# Patient Record
Sex: FEMALE | Race: BLACK OR AFRICAN AMERICAN | NOT HISPANIC OR LATINO | Employment: FULL TIME | URBAN - METROPOLITAN AREA
[De-identification: names, ages, dates, MRNs, and addresses within clinical notes are randomized per-mention and may not be internally consistent; named-entity substitution may affect disease eponyms.]

---

## 2018-03-12 ENCOUNTER — HOSPITAL ENCOUNTER (EMERGENCY)
Facility: OTHER | Age: 63
Discharge: HOME | End: 2018-03-12
Attending: EMERGENCY MEDICINE

## 2018-03-12 VITALS
HEIGHT: 66 IN | OXYGEN SATURATION: 96 % | DIASTOLIC BLOOD PRESSURE: 74 MMHG | BODY MASS INDEX: 41.78 KG/M2 | WEIGHT: 260 LBS | RESPIRATION RATE: 16 BRPM | TEMPERATURE: 98 F | SYSTOLIC BLOOD PRESSURE: 163 MMHG | HEART RATE: 52 BPM

## 2018-03-12 DIAGNOSIS — L02.213 ABSCESS OF CHEST WALL: Primary | ICD-10-CM

## 2018-03-12 PROCEDURE — 25000003 PHARM REV CODE 250: Performed by: PHYSICIAN ASSISTANT

## 2018-03-12 PROCEDURE — 99283 EMERGENCY DEPT VISIT LOW MDM: CPT

## 2018-03-12 RX ORDER — CEPHALEXIN 500 MG/1
500 CAPSULE ORAL 4 TIMES DAILY
Qty: 20 CAPSULE | Refills: 0 | Status: SHIPPED | OUTPATIENT
Start: 2018-03-12 | End: 2018-03-19

## 2018-03-12 RX ORDER — IBUPROFEN 800 MG/1
800 TABLET ORAL EVERY 6 HOURS PRN
Qty: 20 TABLET | Refills: 0 | Status: SHIPPED | OUTPATIENT
Start: 2018-03-12 | End: 2019-09-12 | Stop reason: CLARIF

## 2018-03-12 RX ORDER — SULFAMETHOXAZOLE AND TRIMETHOPRIM 800; 160 MG/1; MG/1
1 TABLET ORAL
COMMUNITY
End: 2019-09-12 | Stop reason: CLARIF

## 2018-03-12 RX ORDER — IBUPROFEN 400 MG/1
800 TABLET ORAL
Status: COMPLETED | OUTPATIENT
Start: 2018-03-12 | End: 2018-03-12

## 2018-03-12 RX ORDER — CEPHALEXIN 500 MG/1
500 CAPSULE ORAL
Status: COMPLETED | OUTPATIENT
Start: 2018-03-12 | End: 2018-03-12

## 2018-03-12 RX ADMIN — IBUPROFEN 800 MG: 400 TABLET, FILM COATED ORAL at 02:03

## 2018-03-12 RX ADMIN — CEPHALEXIN 500 MG: 500 CAPSULE ORAL at 02:03

## 2018-03-12 NOTE — ED PROVIDER NOTES
Encounter Date: 3/12/2018       History     Chief Complaint   Patient presents with    Abscess     reports bite to R chest wall. has been on bactrim x 1 wk with swelling slightly worsening.      Patient is a 63 y.o. female presenting to the emergency department with complaints of an abscess.  The patient reports that she initially noticed an area of redness and pain on the right side of her chest on 18.  She states that the area progressively worsened.  She reports she was seen at her primary care provider's office on 3/5/18.  She states she was told it was too hard to open, and instead given a prescription for Bactrim.  She states she's been taking this appropriately in addition to Aleve for pain and has been applying topical antibiotic ointment.  She reports that the areas continued to progressively worsened.  She states that 3 days ago it opened and started draining.  She states she is concerned because it is not improving.  She denies fever or chills, nausea or vomiting.  She denies previous episode.      The history is provided by the patient.     Review of patient's allergies indicates:  No Known Allergies  Past Medical History:   Diagnosis Date    Diverticulitis      Past Surgical History:   Procedure Laterality Date     SECTION, CLASSIC       Family History   Problem Relation Age of Onset    Breast cancer Neg Hx     Cancer Neg Hx     Ovarian cancer Neg Hx      Social History   Substance Use Topics    Smoking status: Current Every Day Smoker     Packs/day: 1.00     Types: Cigarettes, Cigars    Smokeless tobacco: Not on file    Alcohol use Yes     Review of Systems   Constitutional: Negative for activity change, appetite change, chills, fatigue and fever.   HENT: Negative for congestion, rhinorrhea and sore throat.    Eyes: Negative for photophobia and visual disturbance.   Respiratory: Negative for cough, shortness of breath and wheezing.    Cardiovascular: Negative for chest pain.    Gastrointestinal: Negative for abdominal pain, diarrhea, nausea and vomiting.   Genitourinary: Negative for dysuria, hematuria and urgency.   Musculoskeletal: Negative for back pain, myalgias and neck pain.   Skin: Negative for color change and wound.   Neurological: Negative for weakness and headaches.   Psychiatric/Behavioral: Negative for agitation and confusion.       Physical Exam     Initial Vitals [03/12/18 1240]   BP Pulse Resp Temp SpO2   (!) 159/74 62 18 98.2 °F (36.8 °C) 95 %      MAP       102.33         Physical Exam    Nursing note and vitals reviewed.  Constitutional: She appears well-developed and well-nourished. She is not diaphoretic. She is cooperative.  Non-toxic appearance. She does not have a sickly appearance. She does not appear ill. No distress.   Well appearing -American female unaccompanied in the emergency department.  She speaking clear and full sentences.  She is in no acute distress.   HENT:   Head: Normocephalic and atraumatic.   Right Ear: External ear normal.   Left Ear: External ear normal.   Nose: Nose normal.   Mouth/Throat: Oropharynx is clear and moist.   Eyes: Conjunctivae and EOM are normal.   Neck: Normal range of motion. Neck supple.   Musculoskeletal: Normal range of motion.   Neurological: She is alert and oriented to person, place, and time.   Skin: Skin is warm.        Psychiatric: She has a normal mood and affect. Her behavior is normal. Judgment and thought content normal.         ED Course   Procedures  Labs Reviewed - No data to display          Medical Decision Making:   Initial Assessment:   Urgent evaluation of a 63-year-old female presenting with complaints of an abscess ×10 days.  Patient is afebrile, nontoxic appearing, hemodynamically stable.  Physical exam reveals a 3 cm, open abscess with no overlying cellulitis.  ED Management:  Signs and symptoms are consistent with an abscess.  I do not feel that Bactrim is sufficient coverage, will add Keflex.   Patient's first dose was administered in the emergency department.  Counseled on symptomatic care and treatment, stable for discharge home. The patient was instructed to follow up with a primary care provider in 2 days or to return to the emergency department for worsening symptoms. The treatment plan was discussed with the patient who demonstrated understanding and comfort with plan. The patient's history, physical exam, and plan of care was discussed with and agreed upon with my supervising physician.    Other:   I have discussed this case with another health care provider.       <> Summary of the Discussion: Dr. Arvizu  This note was created using Dragon Medical Dictation. There may be typographical errors secondary to dictation.                       Clinical Impression:     1. Abscess of chest wall         Disposition:   Disposition: Discharged  Condition: Stable                        Kamilla Green PA-C  03/12/18 1446

## 2018-03-12 NOTE — ED NOTES
Pt presents with an abscess to the right chest wall X10 days. Pt was seen by he MD on 3/5/18 and given abx, sent home and told to do warm compresses. Pt reports that the pain and area of abscess has gotten worse since then . Pt reports on Thursday 3/8 the abscess began to open and drain.

## 2019-01-26 ENCOUNTER — HOSPITAL ENCOUNTER (EMERGENCY)
Facility: OTHER | Age: 64
Discharge: HOME | End: 2019-01-26
Attending: EMERGENCY MEDICINE

## 2019-01-26 VITALS
HEIGHT: 66 IN | WEIGHT: 268.75 LBS | OXYGEN SATURATION: 96 % | RESPIRATION RATE: 18 BRPM | TEMPERATURE: 98 F | DIASTOLIC BLOOD PRESSURE: 76 MMHG | SYSTOLIC BLOOD PRESSURE: 157 MMHG | HEART RATE: 77 BPM | BODY MASS INDEX: 43.19 KG/M2

## 2019-01-26 DIAGNOSIS — N30.90 CYSTITIS: Primary | ICD-10-CM

## 2019-01-26 LAB
BACTERIA #/AREA URNS HPF: ABNORMAL /HPF
BILIRUB UR QL STRIP: NEGATIVE
CLARITY UR: ABNORMAL
COLOR UR: YELLOW
GLUCOSE UR QL STRIP: NEGATIVE
HGB UR QL STRIP: ABNORMAL
HYALINE CASTS #/AREA URNS LPF: 0 /LPF
KETONES UR QL STRIP: NEGATIVE
LEUKOCYTE ESTERASE UR QL STRIP: ABNORMAL
MICROSCOPIC COMMENT: ABNORMAL
NITRITE UR QL STRIP: NEGATIVE
PH UR STRIP: 6 [PH] (ref 5–8)
PROT UR QL STRIP: ABNORMAL
RBC #/AREA URNS HPF: 5 /HPF (ref 0–4)
SP GR UR STRIP: 1.02 (ref 1–1.03)
URN SPEC COLLECT METH UR: ABNORMAL
UROBILINOGEN UR STRIP-ACNC: NEGATIVE EU/DL
WBC #/AREA URNS HPF: >100 /HPF (ref 0–5)

## 2019-01-26 PROCEDURE — 87186 SC STD MICRODIL/AGAR DIL: CPT

## 2019-01-26 PROCEDURE — 87086 URINE CULTURE/COLONY COUNT: CPT

## 2019-01-26 PROCEDURE — 87077 CULTURE AEROBIC IDENTIFY: CPT

## 2019-01-26 PROCEDURE — 99284 EMERGENCY DEPT VISIT MOD MDM: CPT

## 2019-01-26 PROCEDURE — 87088 URINE BACTERIA CULTURE: CPT

## 2019-01-26 PROCEDURE — 81000 URINALYSIS NONAUTO W/SCOPE: CPT

## 2019-01-26 RX ORDER — CEPHALEXIN 500 MG/1
500 CAPSULE ORAL EVERY 12 HOURS
Qty: 14 CAPSULE | Refills: 0 | Status: SHIPPED | OUTPATIENT
Start: 2019-01-26 | End: 2019-02-02

## 2019-01-26 RX ORDER — PHENAZOPYRIDINE HYDROCHLORIDE 200 MG/1
200 TABLET, FILM COATED ORAL 3 TIMES DAILY PRN
Qty: 6 TABLET | Refills: 0 | Status: SHIPPED | OUTPATIENT
Start: 2019-01-26 | End: 2019-09-12 | Stop reason: CLARIF

## 2019-01-27 NOTE — ED PROVIDER NOTES
Encounter Date: 2019       History     Chief Complaint   Patient presents with    Urinary Tract Infection     pressure after urinating w/ frequency and strong odor     Patient is a 64-year-old female with diverticulitis who presents to the ED with urinary frequency and dysuria.  Patient reports she developed urinary frequency with a strong odor 2 days ago.  She states she increase her fluid intake and no odor resolved.  She states the dysuria has become more intense over the past day and she developed suprapubic tenderness while urinating and for short amount of time after.  She denies nausea, fever, vomiting, or flank pain. Patient states she had a UTI a few years ago and was treated with Bactrim but did not like the side effects of the medication.  She is otherwise feeling well.          Review of patient's allergies indicates:  No Known Allergies  Past Medical History:   Diagnosis Date    Diverticulitis      Past Surgical History:   Procedure Laterality Date     SECTION, CLASSIC       Family History   Problem Relation Age of Onset    Breast cancer Neg Hx     Cancer Neg Hx     Ovarian cancer Neg Hx      Social History     Tobacco Use    Smoking status: Current Every Day Smoker     Packs/day: 1.00     Types: Cigarettes, Cigars    Smokeless tobacco: Never Used   Substance Use Topics    Alcohol use: Yes    Drug use: No     Review of Systems   Constitutional: Negative for chills and fever.   HENT: Negative for congestion and sore throat.    Eyes: Negative for pain.   Respiratory: Negative for shortness of breath.    Cardiovascular: Negative for chest pain.   Gastrointestinal: Negative for abdominal pain, diarrhea, nausea and vomiting.   Genitourinary: Positive for dysuria and frequency. Negative for difficulty urinating and flank pain.   Musculoskeletal: Negative for arthralgias.   Skin: Negative for rash.   Neurological: Negative for headaches.       Physical Exam     Initial Vitals [19  2022]   BP Pulse Resp Temp SpO2   (!) 157/76 77 18 97.9 °F (36.6 °C) 96 %      MAP       --         Physical Exam    Constitutional: Vital signs are normal. She is not diaphoretic. She is cooperative. No distress.   HENT:   Head: Normocephalic and atraumatic.   Eyes: EOM are normal. Pupils are equal, round, and reactive to light.   Neck: Normal range of motion. Neck supple.   Cardiovascular: Normal rate, regular rhythm and intact distal pulses.   Pulmonary/Chest: Breath sounds normal. She has no wheezes. She has no rhonchi. She has no rales.   Abdominal: Soft. Bowel sounds are normal. There is no tenderness.   No CVA tenderness bilaterally   Musculoskeletal: Normal range of motion. She exhibits no edema.   Neurological: She is alert and oriented to person, place, and time. GCS eye subscore is 4. GCS verbal subscore is 5. GCS motor subscore is 6.   Skin: Skin is warm and dry. No rash noted.         ED Course   Procedures  Labs Reviewed   URINALYSIS, REFLEX TO URINE CULTURE - Abnormal; Notable for the following components:       Result Value    Appearance, UA Hazy (*)     Protein, UA 2+ (*)     Occult Blood UA 3+ (*)     Leukocytes, UA 2+ (*)     All other components within normal limits    Narrative:     Preferred Collection Type->Urine, Clean Catch   URINALYSIS MICROSCOPIC - Abnormal; Notable for the following components:    RBC, UA 5 (*)     WBC, UA >100 (*)     Bacteria, UA Moderate (*)     All other components within normal limits    Narrative:     Preferred Collection Type->Urine, Clean Catch   CULTURE, URINE          Imaging Results    None          Medical Decision Making:   Initial Assessment:   Urgent evaluation of a 64 y.o. Female presenting to the emergency department complaining of urinary frequency and dysuria. Patient is afebrile, nontoxic appearing and hemodynamically stable.  Patient's symptoms are consistent with cystitis. UA consistent with UTI. No further intervention necessary. I do not suspect  pyelonephritis. Will treat with Pyridium and Keflex.   Patient did have 2+ protein on her urine.  She denies any swelling. She reports normal renal function.  Do not believe lab work is indicated at this time.  Patient is advised to take Motrin or Tylenol as needed for her pain.  She is given strict return precautions advised follow up with her primary care provider next week.  She has no other complaints this time is stable for discharge.  ED Management:  Nursing assessment and intervention was not needed on this patient.                      Clinical Impression:     1. Cystitis                               Ivan Deshpande PA-C  01/26/19 3173

## 2019-01-29 LAB — BACTERIA UR CULT: NORMAL

## 2019-04-25 DIAGNOSIS — M25.569 KNEE PAIN: ICD-10-CM

## 2019-04-25 DIAGNOSIS — M25.572 LEFT ANKLE PAIN: Primary | ICD-10-CM

## 2019-05-08 ENCOUNTER — CLINICAL SUPPORT (OUTPATIENT)
Dept: REHABILITATION | Facility: OTHER | Age: 64
End: 2019-05-08
Payer: MEDICAID

## 2019-05-08 DIAGNOSIS — M25.572 CHRONIC PAIN OF LEFT ANKLE: ICD-10-CM

## 2019-05-08 DIAGNOSIS — R29.898 WEAKNESS OF BOTH LOWER EXTREMITIES: ICD-10-CM

## 2019-05-08 DIAGNOSIS — G89.29 CHRONIC PAIN OF LEFT ANKLE: ICD-10-CM

## 2019-05-08 DIAGNOSIS — M25.561 CHRONIC PAIN OF RIGHT KNEE: ICD-10-CM

## 2019-05-08 DIAGNOSIS — G89.29 CHRONIC PAIN OF RIGHT KNEE: ICD-10-CM

## 2019-05-08 DIAGNOSIS — M25.672 ANKLE STIFFNESS, LEFT: ICD-10-CM

## 2019-05-08 PROCEDURE — 97162 PT EVAL MOD COMPLEX 30 MIN: CPT | Mod: PN | Performed by: PHYSICAL THERAPIST

## 2019-05-08 NOTE — PLAN OF CARE
[facility]  Physical Therapy Initial Evaluation    Name: Denisha Becker  Clinic Number: [id number]    Therapy Diagnosis:   1. Chronic pain of right knee     2. Chronic pain of left ankle     3. Weakness of both lower extremities     4. Ankle stiffness, left         Physician: Claudia Webb, NP-C    Physician Orders: PT Eval and Treat   Medical Diagnosis from Referral:   Rt knee oa and left ankle pain     Evaluation Date: 2019  Authorization Period Expiration: 2020  Plan of Care Expiration: 2019  Visit # / Visits authorized:     Time In: 2:00pm  Time Out: 2:45pm  Total Billable Time: 45 minutes    Precautions: Standard    Subjective   Date of onset:   History of current condition - Denisha reports: fracturing her ankle (lateral malleolus) in  and was working in the school system at the time. She went out on short term disability and in a boot for 6 weeks. No surgery. She started physical therapy but was unable to afford to continue. She never regained her ROM and continues to have swelling and pain with activity. Also with major complaint of R knee pain for years. She had an injection and on naproxen with no relief. The naproxen bothered her stomach and switched to tumeric supplement for the inflammation. 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 She performed at [employer] twice 3 days following the injection.      Medical History:   Past Medical History:   Diagnosis Date    Diverticulitis        Surgical History:   Denisha Becker  has a past surgical history that includes  section, classic.    Medications:   Denisha has a current medication list which includes the following prescription(s): desonide, hydroxyzine hcl, ibuprofen, phenazopyridine, and sulfamethoxazole-trimethoprim 800-160mg.    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  Prior Level of Function: independent, dancing and performing without limitations  Current Level of Function: increased pain with squatting, descending stairs (goes 1 leg at a time), twisting, and dancing    Pain:  Current 5/10, worst 5/10, best 1/10   Location: R anterior-medial knee, L lateral ankle  Description: Aching and Tight  Aggravating Factors: Standing, Walking and twisting  Easing Factors: ice and elevation    Pts goals: To be able to continue dancing and regain the motion in her L ankle    Objective     Observation: calm and pleasant mood    Posture: increased pelvic tilt and lumbar lordosis     Range of Motion:   Knee Left active Left Passive Right Active R passive   Flexion 110 115 105 108   Extension 0 0 0 0     Ankle AROM/PROM  Left  Right    Dorsiflexion:   5/8  10/15  Plantarflexion:   30/30  45/50  Inversion:   15/20  30/40  Eversion:   8/12  15/20    Measure in degrees, *indicates pain with movement      MMT    Left  Right    Hip:  Flexion    4+/5   4/5  Extension   4/5   4-/5  Abduction   4/5   4/5  Adduction   5/5   4+/5  External Rotation  4/5   5/5  Internal rotation  5/5   5/5    Knee:  Flexion    4+/5   5/5  Extension   4-/5   5/5    Ankle:  Dorsiflexion   5/5   5/5  Plantar flexion   3+/5   4/5  Inversion   4+/5   5/5  Eversion   4/5   5/5      Special Tests:   Right   Valgus Stress Test positive   Varus Stress test negative       Function:    - SLS R: 3 sec  - SLS L: unable   - Squat: dysfunctional painful       Joint Mobility: 2/6 AP talocrual joint    Palpation: TTP medial joint line L knee    Sensation: BLE light touch sensation grossly intact    Flexibility:    Ely's test: R = 100 degrees ; L = 100 degrees   Popliteal Angle: R = 0 degrees ; L = 0 degrees   Christopher's test: R = + ; L = +      CMS Impairment/Limitation/Restriction for FOTO ankle Survey    Therapist reviewed  "FOTO scores for Denisha Becker on 5/8/2019.   FOTO documents entered into [employer] - see Media section.    Limitation Score: 52%  Goal:44%  Discharge: NA         TREATMENT   Treatment Time In: 2:30pm  Treatment Time Out: 2:45pm  Total Treatment time separate from Evaluation: 15 minutes    Densiha received therapeutic exercises to develop strength, endurance, ROM and flexibility for 10 minutes including:  Seated gastroc stretch with towel 30" x 3  Seated soleus stretch with Towel 30" x 3  Prone knee flexion stretch 30" x 3  Bridging x 10  S/L Clams OTB x 10 ea    Denisha received the following manual therapy techniques: Joint mobilizations and Manual traction were applied to the: L ankle for 5 minutes, including:  talocrual AP glides and distraction    Home Exercises and Patient Education Provided    Education provided:   - HEP    Written Home Exercises Provided: yes.  Exercises were reviewed and Denisha was able to demonstrate them prior to the end of the session.  Denisha demonstrated good  understanding of the education provided.     See EMR under Patient Instructions for exercises provided 5/8/2019.    Assessment   Denisha is a 64 y.o. female referred to outpatient Physical Therapy with a medical diagnosis of R knee and L ankle pain with hx of OA and lateral malleolus fracture. Pt presents with decreased L ankle and B knee ROM, decreased joint mobility L ankle, decreased BLE flexibility, BLE weakness, and balance impairment.     Pt prognosis is Good.   Pt will benefit from skilled outpatient Physical Therapy to address the deficits stated above and in the chart below, provide pt/family education, and to maximize pt's level of independence.     Plan of care discussed with patient: Yes  Pt's spiritual, cultural and educational needs considered and patient is agreeable to the plan of care and goals as stated below:     Anticipated Barriers for therapy: none    Medical Necessity is demonstrated by the " following  History  Co-morbidities and personal factors that may impact the plan of care Co-morbidities:   Ankle fx    Personal Factors:   no deficits     moderate   Examination  Body Structures and Functions, activity limitations and participation restrictions that may impact the plan of care Body Regions:   lower extremities    Body Systems:    ROM  strength  balance  gait    Participation Restrictions:   dancing    Activity limitations:   Learning and applying knowledge  no deficits    General Tasks and Commands  no deficits    Communication  no deficits    Mobility  walking    Self care  no deficits    Domestic Life  no deficits    Interactions/Relationships  no deficits    Life Areas  employment    Community and Social Life  no deficits         high   Clinical Presentation evolving clinical presentation with changing clinical characteristics moderate   Decision Making/ Complexity Score: moderate     Goals:  Short Term Goals (4 Weeks):   1. Patient's B knee flexion to be 120 degrees for improved functional ability to squat  2. Patient to increase L ankle dorsiflexion AROM to 15 degrees or greater for improved stair descent  3. Patient to report improved pain in L ankle and R knee by 30% or greater with ADL's  Long Term Goals (8 Weeks):   1. Patient to have decreased subjective report of disability as noted by a score of 44% or less on the FOTO knee questionnaire   2. Patient to be independent with home exercise program for improved self management of condition  3. Patient's B lower extremity strength to be 5/5 for improved functional ability to perform house hold chores  4. Patient to be able to dance with minimal to no difficulty     Plan   Plan of care Certification: 5/8/2019 to 8/2/2019.    Outpatient Physical Therapy 2 times weekly for 10 weeks to include the following interventions: Gait Training, Manual Therapy, Moist Heat/ Ice, Neuromuscular Re-ed, Patient Education, Therapeutic Activites, Therapeutic  Exercise and dry needling.     Kiana Guy, PT

## 2019-05-15 ENCOUNTER — CLINICAL SUPPORT (OUTPATIENT)
Dept: REHABILITATION | Facility: OTHER | Age: 64
End: 2019-05-15
Payer: MEDICAID

## 2019-05-15 DIAGNOSIS — G89.29 CHRONIC PAIN OF RIGHT KNEE: ICD-10-CM

## 2019-05-15 DIAGNOSIS — M25.572 CHRONIC PAIN OF LEFT ANKLE: ICD-10-CM

## 2019-05-15 DIAGNOSIS — R29.898 WEAKNESS OF BOTH LOWER EXTREMITIES: ICD-10-CM

## 2019-05-15 DIAGNOSIS — G89.29 CHRONIC PAIN OF LEFT ANKLE: ICD-10-CM

## 2019-05-15 DIAGNOSIS — M25.561 CHRONIC PAIN OF RIGHT KNEE: ICD-10-CM

## 2019-05-15 DIAGNOSIS — M25.672 ANKLE STIFFNESS, LEFT: ICD-10-CM

## 2019-05-15 PROCEDURE — 97110 THERAPEUTIC EXERCISES: CPT | Mod: PN | Performed by: PHYSICAL THERAPIST

## 2019-05-15 NOTE — PROGRESS NOTES
"  Physical Therapy Daily Treatment Note     Name: Denisha Becker  Clinic Number: [id number]    Therapy Diagnosis:   Encounter Diagnoses   Name Primary?    Chronic pain of right knee     Chronic pain of left ankle     Weakness of both lower extremities     Ankle stiffness, left      Physician: Claudia Webb NP-C    Visit Date: 5/15/2019    Physician Orders: PT Eval and Treat   Medical Diagnosis from Referral:   Rt knee oa and left ankle pain      Evaluation Date: 5/8/2019  Authorization Period Expiration: 4/24/2020  Plan of Care Expiration: 8/2/2019  Visit # / Visits authorized: 1/ 12    Time In: 510pm  Time Out: 605pm  Total Billable Time: 55 minutes    Precautions: Standard    Subjective     Pt reports: feeling a little better with the exercises. The tumeric and cutting back on meat has also been helping with the inflammation in her body. Her knee is more painful than her ankle today  She was compliant with home exercise program.  Response to previous treatment: decreased stifffness  Functional change: none    Pain: 5/10  Location: right knee      Objective     Denisha received therapeutic exercises to develop strength, endurance, ROM, flexibility, posture and core stabilization for 50 minutes including:    Upright bike x 6 min    Seated gastroc stretch with strap30" x 3  Seated soleus stretch with strap 30" x 3 (with PT overpressure)  Prone knee flexion stretch 30" x 3  Bridging x 10  S/L Clams OTB x 10 ea  QS 10" x 10  SLR x 10  SL hip abduction x 10    Denisha received the following manual therapy techniques: Joint mobilizations, Manual traction and Soft tissue Mobilization were applied to the: L ankle and R knee for 5 minutes, including:  talocrual AP glides and distraction (nt)  Rolling stick to R quads, IT band, gluteals    Denisha received cold pack for 0 minutes to R knee, L ankle.      Home Exercises Provided and Patient Education Provided     Education provided:   - HEP    Written Home Exercises " Provided: Patient instructed to cont prior HEP.  Exercises were reviewed and Denisha was able to demonstrate them prior to the end of the session.  Denisha demonstrated good  understanding of the education provided.     See EMR under Patient Instructions for exercises provided prior visit.    Assessment     Good tolerance to initiation of therapeutic exercise without exacerbation of knee pain. Pt with anterior pelvic tilt and excessive lumbar lordosis. Frequent verbal cues for engaging abdominals and neutral pelvis.   Denisha is progressing well towards her goals.   Pt prognosis is Good.     Pt will continue to benefit from skilled outpatient physical therapy to address the deficits listed in the problem list box on initial evaluation, provide pt/family education and to maximize pt's level of independence in the home and community environment.     Pt's spiritual, cultural and educational needs considered and pt agreeable to plan of care and goals.     Anticipated barriers to physical therapy: transportation    Goals:   Short Term Goals (4 Weeks):   1. Patient's B knee flexion to be 120 degrees for improved functional ability to squat (progressing, not met)  2. Patient to increase L ankle dorsiflexion AROM to 15 degrees or greater for improved stair descent (progressing, not met)  3. Patient to report improved pain in L ankle and R knee by 30% or greater with ADL's (progressing, not met)  Long Term Goals (8 Weeks):   1. Patient to have decreased subjective report of disability as noted by a score of 44% or less on the FOTO knee questionnaire  (progressing, not met)  2. Patient to be independent with home exercise program for improved self management of condition (progressing, not met)  3. Patient's B lower extremity strength to be 5/5 for improved functional ability to perform house hold chores (progressing, not met)  4. Patient to be able to dance with minimal to no difficulty  (progressing, not met)      Plan      Continue per POC with emphasis on ROM and core strength    Kiana Guy, PT

## 2019-05-21 ENCOUNTER — CLINICAL SUPPORT (OUTPATIENT)
Dept: REHABILITATION | Facility: OTHER | Age: 64
End: 2019-05-21
Payer: MEDICAID

## 2019-05-21 DIAGNOSIS — M25.672 ANKLE STIFFNESS, LEFT: ICD-10-CM

## 2019-05-21 DIAGNOSIS — G89.29 CHRONIC PAIN OF LEFT ANKLE: ICD-10-CM

## 2019-05-21 DIAGNOSIS — M25.561 CHRONIC PAIN OF RIGHT KNEE: ICD-10-CM

## 2019-05-21 DIAGNOSIS — G89.29 CHRONIC PAIN OF RIGHT KNEE: ICD-10-CM

## 2019-05-21 DIAGNOSIS — R29.898 WEAKNESS OF BOTH LOWER EXTREMITIES: ICD-10-CM

## 2019-05-21 DIAGNOSIS — M25.572 CHRONIC PAIN OF LEFT ANKLE: ICD-10-CM

## 2019-05-21 PROCEDURE — 97140 MANUAL THERAPY 1/> REGIONS: CPT | Mod: PN

## 2019-05-21 PROCEDURE — 97110 THERAPEUTIC EXERCISES: CPT | Mod: PN

## 2019-05-21 NOTE — PROGRESS NOTES
"  Physical Therapy Daily Treatment Note     Name: Denisha Becker  Clinic Number: [id number]    Therapy Diagnosis:   Encounter Diagnoses   Name Primary?    Chronic pain of right knee     Chronic pain of left ankle     Weakness of both lower extremities     Ankle stiffness, left      Physician: Claudia Webb NP-C    Visit Date: 5/21/2019    Physician Orders: PT Eval and Treat   Medical Diagnosis from Referral:   Rt knee oa and left ankle pain      Evaluation Date: 5/8/2019  Authorization Period Expiration: 4/24/2020  Plan of Care Expiration: 8/2/2019  Visit # / Visits authorized: 2/ 12    Time In: 4:00 pm  Time Out: 5:10 pm  Total Billable Time: 60 minutes    Precautions: Standard    Subjective     Pt reports: stiffness today in the knees > ankles but is overall feeling good. "The exercise helps lubricates my joints."  She was compliant with home exercise program.  Response to previous treatment: decreased stifffness  Functional change: none    Pain: 5/10  Location: right knee      Objective     Denisha received therapeutic exercises to develop strength, endurance, ROM, flexibility, posture and core stabilization for 50 minutes including:    Upright bike x 7 min    Seated gastroc stretch with strap30" x 3  Seated soleus stretch with strap 30" x 3 (with PT overpressure)  Prone knee flexion stretch 30" x 3  Bridging 2 x 10 x 3" hold  S/L Clams OTB x 10 ea  QS 10" x 10  SLR 2 x 10  SL hip abduction x 10    Denisha received the following manual therapy techniques: Joint mobilizations, Manual traction and Soft tissue Mobilization were applied to the: L ankle and R knee for 10 minutes, including:  talocrual AP glides and distraction (nt)  Rolling stick to R quads, IT band, gluteals    Denisha received cold pack for 10 minutes to R knee, L ankle.      Home Exercises Provided and Patient Education Provided     Education provided:   - HEP    Written Home Exercises Provided: Patient instructed to cont prior HEP.  Exercises " were reviewed and Denisha was able to demonstrate them prior to the end of the session.  Denisha demonstrated good  understanding of the education provided.     See EMR under Patient Instructions for exercises provided prior visit.    Assessment     Able to perform increased repetitions with several exercises with good training effect, indicating improving muscular endurance.  Denisha is progressing well towards her goals.   Pt prognosis is Good.     Pt will continue to benefit from skilled outpatient physical therapy to address the deficits listed in the problem list box on initial evaluation, provide pt/family education and to maximize pt's level of independence in the home and community environment.     Pt's spiritual, cultural and educational needs considered and pt agreeable to plan of care and goals.     Anticipated barriers to physical therapy: transportation    Goals:   Short Term Goals (4 Weeks):   1. Patient's B knee flexion to be 120 degrees for improved functional ability to squat (progressing, not met)  2. Patient to increase L ankle dorsiflexion AROM to 15 degrees or greater for improved stair descent (progressing, not met)  3. Patient to report improved pain in L ankle and R knee by 30% or greater with ADL's (progressing, not met)  Long Term Goals (8 Weeks):   1. Patient to have decreased subjective report of disability as noted by a score of 44% or less on the FOTO knee questionnaire  (progressing, not met)  2. Patient to be independent with home exercise program for improved self management of condition (progressing, not met)  3. Patient's B lower extremity strength to be 5/5 for improved functional ability to perform house hold chores (progressing, not met)  4. Patient to be able to dance with minimal to no difficulty  (progressing, not met)      Plan     Continue per POC with emphasis on ROM and core strength    Rosalie Adair, PT

## 2019-05-27 ENCOUNTER — CLINICAL SUPPORT (OUTPATIENT)
Dept: REHABILITATION | Facility: OTHER | Age: 64
End: 2019-05-27
Payer: MEDICAID

## 2019-05-27 DIAGNOSIS — M25.561 CHRONIC PAIN OF RIGHT KNEE: ICD-10-CM

## 2019-05-27 DIAGNOSIS — M25.672 ANKLE STIFFNESS, LEFT: ICD-10-CM

## 2019-05-27 DIAGNOSIS — R29.898 WEAKNESS OF BOTH LOWER EXTREMITIES: ICD-10-CM

## 2019-05-27 DIAGNOSIS — G89.29 CHRONIC PAIN OF LEFT ANKLE: ICD-10-CM

## 2019-05-27 DIAGNOSIS — G89.29 CHRONIC PAIN OF RIGHT KNEE: ICD-10-CM

## 2019-05-27 DIAGNOSIS — M25.572 CHRONIC PAIN OF LEFT ANKLE: ICD-10-CM

## 2019-05-27 PROCEDURE — 97110 THERAPEUTIC EXERCISES: CPT | Mod: PN

## 2019-05-27 NOTE — PROGRESS NOTES
"  Physical Therapy Daily Treatment Note     Name: Denisha Becker  Clinic Number: [id number]    Therapy Diagnosis:   Encounter Diagnoses   Name Primary?    Chronic pain of right knee     Chronic pain of left ankle     Weakness of both lower extremities     Ankle stiffness, left      Physician: Claudia Webb NP-C    Visit Date: 5/27/2019    Physician Orders: PT Eval and Treat   Medical Diagnosis from Referral:   Rt knee oa and left ankle pain      Evaluation Date: 5/8/2019  Authorization Period Expiration: 4/24/2020  Plan of Care Expiration: 8/2/2019  Visit # / Visits authorized: 3/12    Time In: 5:00 PM  Time Out: 6:00 PM  Total Billable Time: 30 minutes    Precautions: Standard    Subjective     Pt reports: denies pain today. Pt reports of only stiffness, mainly when first getting up in the morning or from sitting for long periods of time.   She was compliant with home exercise program.  Response to previous treatment: decreased stifffness  Functional change: none    Pain: 0/10  Location: right knee      Objective     Denisha received therapeutic exercises to develop strength, endurance, ROM, flexibility, posture and core stabilization for 40 minutes including:    Upright bike x 7 min (nt)  Stationary bike x 7 mins    Seated gastroc stretch with strap30" x 3 (on SB today)  Seated soleus stretch with strap 30" x 3 (on SB today)  Prone knee flexion stretch 30" x 3  Bridging 2 x 10 x 3" hold  S/L Clams OTB x 10 ea  QS 10" x 10  SLR 2 x 10  SL hip abduction x 20    Denisha received the following manual therapy techniques: Joint mobilizations, Manual traction and Soft tissue Mobilization were applied to the: L ankle and R knee for 0 minutes, including:  talocrual AP glides and distraction (nt)  Rolling stick to R quads, IT band, gluteals    Denisha received cold pack for 10 minutes to R knee, L ankle.      Home Exercises Provided and Patient Education Provided     Education provided:   - HEP    Written Home Exercises " Provided: Patient instructed to cont prior HEP.  Exercises were reviewed and Denisha was able to demonstrate them prior to the end of the session.  Denisha demonstrated good  understanding of the education provided.     See EMR under Patient Instructions for exercises provided prior visit.    Assessment     Pt tolerated therex today without exacerbation of pain. Increased repetitions today without reports of fatigue or difficulty. Pt required TC to keep pelvis in proper position during SL hip abd.   Denisha is progressing well towards her goals.   Pt prognosis is Good.     Pt will continue to benefit from skilled outpatient physical therapy to address the deficits listed in the problem list box on initial evaluation, provide pt/family education and to maximize pt's level of independence in the home and community environment.     Pt's spiritual, cultural and educational needs considered and pt agreeable to plan of care and goals.     Anticipated barriers to physical therapy: transportation    Goals:   Short Term Goals (4 Weeks):   1. Patient's B knee flexion to be 120 degrees for improved functional ability to squat (progressing, not met)  2. Patient to increase L ankle dorsiflexion AROM to 15 degrees or greater for improved stair descent (progressing, not met)  3. Patient to report improved pain in L ankle and R knee by 30% or greater with ADL's (progressing, not met)  Long Term Goals (8 Weeks):   1. Patient to have decreased subjective report of disability as noted by a score of 44% or less on the FOTO knee questionnaire  (progressing, not met)  2. Patient to be independent with home exercise program for improved self management of condition (progressing, not met)  3. Patient's B lower extremity strength to be 5/5 for improved functional ability to perform house hold chores (progressing, not met)  4. Patient to be able to dance with minimal to no difficulty  (progressing, not met)      Plan     Continue per POC with  emphasis on ROM and core strength    Ethan Rea, PTA

## 2019-06-06 ENCOUNTER — DOCUMENTATION ONLY (OUTPATIENT)
Dept: REHABILITATION | Facility: OTHER | Age: 64
End: 2019-06-06

## 2019-07-31 ENCOUNTER — OFFICE VISIT (OUTPATIENT)
Dept: OBSTETRICS AND GYNECOLOGY | Facility: CLINIC | Age: 64
End: 2019-07-31
Payer: MEDICAID

## 2019-07-31 VITALS
SYSTOLIC BLOOD PRESSURE: 120 MMHG | WEIGHT: 277.75 LBS | HEIGHT: 66 IN | BODY MASS INDEX: 44.64 KG/M2 | DIASTOLIC BLOOD PRESSURE: 80 MMHG

## 2019-07-31 DIAGNOSIS — Z11.3 SCREEN FOR STD (SEXUALLY TRANSMITTED DISEASE): ICD-10-CM

## 2019-07-31 DIAGNOSIS — Z12.31 VISIT FOR SCREENING MAMMOGRAM: Primary | ICD-10-CM

## 2019-07-31 PROCEDURE — 87481 CANDIDA DNA AMP PROBE: CPT | Mod: 59

## 2019-07-31 PROCEDURE — 99999 PR PBB SHADOW E&M-EST. PATIENT-LVL III: ICD-10-PCS | Mod: PBBFAC,,, | Performed by: OBSTETRICS & GYNECOLOGY

## 2019-07-31 PROCEDURE — 99213 OFFICE O/P EST LOW 20 MIN: CPT | Mod: PBBFAC | Performed by: OBSTETRICS & GYNECOLOGY

## 2019-07-31 PROCEDURE — 87491 CHLMYD TRACH DNA AMP PROBE: CPT

## 2019-07-31 PROCEDURE — 99386 PR PREVENTIVE VISIT,NEW,40-64: ICD-10-PCS | Mod: S$PBB,,, | Performed by: OBSTETRICS & GYNECOLOGY

## 2019-07-31 PROCEDURE — 99386 PREV VISIT NEW AGE 40-64: CPT | Mod: S$PBB,,, | Performed by: OBSTETRICS & GYNECOLOGY

## 2019-07-31 PROCEDURE — 99999 PR PBB SHADOW E&M-EST. PATIENT-LVL III: CPT | Mod: PBBFAC,,, | Performed by: OBSTETRICS & GYNECOLOGY

## 2019-07-31 PROCEDURE — 87801 DETECT AGNT MULT DNA AMPLI: CPT

## 2019-07-31 RX ORDER — CETIRIZINE HYDROCHLORIDE 10 MG/1
TABLET ORAL DAILY PRN
Refills: 1 | COMMUNITY
Start: 2019-07-25

## 2019-07-31 RX ORDER — KETOTIFEN FUMARATE 0.35 MG/ML
SOLUTION/ DROPS OPHTHALMIC
Refills: 2 | COMMUNITY
Start: 2019-07-25 | End: 2019-09-12 | Stop reason: CLARIF

## 2019-08-01 ENCOUNTER — PROCEDURE VISIT (OUTPATIENT)
Dept: OBSTETRICS AND GYNECOLOGY | Facility: CLINIC | Age: 64
End: 2019-08-01
Payer: MEDICAID

## 2019-08-01 VITALS
DIASTOLIC BLOOD PRESSURE: 80 MMHG | WEIGHT: 279 LBS | SYSTOLIC BLOOD PRESSURE: 120 MMHG | BODY MASS INDEX: 44.84 KG/M2 | HEIGHT: 66 IN

## 2019-08-01 DIAGNOSIS — N89.8 VAGINAL DISCHARGE: Primary | ICD-10-CM

## 2019-08-01 LAB
BACTERIAL VAGINOSIS DNA: POSITIVE
C TRACH DNA SPEC QL NAA+PROBE: NOT DETECTED
CANDIDA GLABRATA DNA: NEGATIVE
CANDIDA KRUSEI DNA: NEGATIVE
CANDIDA RRNA VAG QL PROBE: NEGATIVE
N GONORRHOEA DNA SPEC QL NAA+PROBE: NOT DETECTED
T VAGINALIS RRNA GENITAL QL PROBE: NEGATIVE

## 2019-08-01 RX ORDER — METRONIDAZOLE 7.5 MG/G
1 GEL VAGINAL DAILY
Qty: 70 G | Refills: 0 | Status: SHIPPED | OUTPATIENT
Start: 2019-08-01 | End: 2019-08-08

## 2019-08-01 NOTE — PATIENT INSTRUCTIONS
[id number]  Expires: 9/15/2019  9:37 AM  Thank you for enrolling in [facility]. Please follow the instructions below to securely access your online medical record. My allows you to send messages to your doctor, view your test results, renew your prescriptions, schedule appointments, and more.        .[email or web address]       How Do I Sign Up?  1. In your Internet browser, go to [email or web address].  2. In the lower right of the page, click the Activate Now link located under the Have Access Code? Title.  3. Enter your [facility] Access Code exactly as it appears below. You will not need to use this code after youve completed the sign-up process. If you do not sign up before the expiration date, you must request a new code.  [facility] Access Code: [email or web address]  Expires: 9/15/2019  9:37 AM    4. Enter Date of Birth (mm/dd/yyyy) as indicated and click the Next button. You will be taken to the next sign-up page.  5. Create a [facility] ID. This will be your new [facility] login ID and cannot be changed, so think of one that is secure and easy to remember.  6. Create a [facility] password.  Your password must be at least 8 characters long and contain at least 1 letter and 1 number.  You can change your password at any time.  7. Enter your Password Reset Question and Answer, then click the Next button.   8. Enter your e-mail address. You will receive e-mail notification when new information is available in [facility].  9. Click Sign Up. You can now view your medical record.     Additional Information  If you have questions, you can email [email or web address] or call [phone]  to talk to our [facility] staff. Remember, [facility] is NOT to be used for urgent needs. For medical emergencies, dial 911.

## 2019-08-05 NOTE — PROGRESS NOTES
HISTORY OF PRESENT ILLNESS:    Denisha Becker is a 64 y.o. female, , No LMP recorded. Patient is postmenopausal.,  presents for a routine exam and has no complaints.    Past Medical History:   Diagnosis Date    Diverticulitis        Past Surgical History:   Procedure Laterality Date     SECTION, CLASSIC         MEDICATIONS AND ALLERGIES:      Current Outpatient Medications:     cetirizine (ZYRTEC) 10 MG tablet, TK 1 T PO QD FOR ALLERGIES, Disp: , Rfl: 1    ketotifen (ZADITOR) 0.025 % (0.035 %) ophthalmic solution, , Disp: , Rfl: 2    desonide (DESOWEN) 0.05 % lotion, Apply topically 2 (two) times daily., Disp: 59 mL, Rfl: 0    hydrOXYzine HCl (ATARAX) 25 MG tablet, Take 1 tablet (25 mg total) by mouth every 4 (four) hours as needed for Itching., Disp: 25 tablet, Rfl: 0    ibuprofen (ADVIL,MOTRIN) 800 MG tablet, Take 1 tablet (800 mg total) by mouth every 6 (six) hours as needed for Pain., Disp: 20 tablet, Rfl: 0    metroNIDAZOLE (METROGEL VAGINAL) 0.75 % vaginal gel, Place 1 applicator vaginally once daily. Use at bedtime for 7 days, Disp: 70 g, Rfl: 0    phenazopyridine (PYRIDIUM) 200 MG tablet, Take 1 tablet (200 mg total) by mouth 3 (three) times daily as needed for Pain (bladder spasms)., Disp: 6 tablet, Rfl: 0    sulfamethoxazole-trimethoprim 800-160mg (BACTRIM DS) 800-160 mg Tab, Take 1 tablet by mouth every 12 (twelve) hours., Disp: , Rfl:     Review of patient's allergies indicates:  No Known Allergies    Family History   Problem Relation Age of Onset    Breast cancer Neg Hx     Cancer Neg Hx     Ovarian cancer Neg Hx        Social History     Socioeconomic History    Marital status: Single     Spouse name: Not on file    Number of children: Not on file    Years of education: Not on file    Highest education level: Not on file   Occupational History    Not on file   Social Needs    Financial resource strain: Not on file    Food insecurity:     Worry: Not on file      Inability: Not on file    Transportation needs:     Medical: Not on file     Non-medical: Not on file   Tobacco Use    Smoking status: Current Every Day Smoker     Packs/day: 1.00     Types: Cigarettes, Cigars    Smokeless tobacco: Never Used   Substance and Sexual Activity    Alcohol use: Yes    Drug use: No    Sexual activity: Yes     Partners: Male     Birth control/protection: None, Post-menopausal   Lifestyle    Physical activity:     Days per week: Not on file     Minutes per session: Not on file    Stress: Not on file   Relationships    Social connections:     Talks on phone: Not on file     Gets together: Not on file     Attends [employer] service: Not on file     Active member of club or organization: Not on file     Attends meetings of clubs or organizations: Not on file     Relationship status: Not on file   Other Topics Concern    Not on file   Social History Narrative    Not on file       COMPREHENSIVE GYN HISTORY:  PAP History: Denies abnormal Paps.  Infection History: Denies STDs. Denies PID.  Benign History: Denies uterine fibroids. Denies ovarian cysts. Denies endometriosis. Denies other conditions.  Cancer History: Denies cervical cancer. Denies uterine cancer or hyperplasia. Denies ovarian cancer. Denies vulvar cancer or pre-cancer. Denies vaginal cancer or pre-cancer. Denies breast cancer. Denies colon cancer.  Sexual Activity History: Reports currently being sexually active  Menstrual History: Monthly. Mod then light flow.   Dysmenorrhea History: Reports mild dysmenorrhea.       ROS:  GENERAL: No weight changes. No swelling. No fatigue. No fever.  CARDIOVASCULAR: No chest pain. No shortness of breath. No leg cramps.   NEUROLOGICAL: No headaches. No vision changes.  BREASTS: No pain. No lumps. No discharge.  ABDOMEN: No pain. No nausea. No vomiting. No diarrhea. No constipation.  REPRODUCTIVE: No abnormal bleeding.   VULVA: No pain. No lesions. No itching.  VAGINA: No relaxation. No  "itching. No odor. No discharge. No lesions.  URINARY: No incontinence. No nocturia. No frequency. No dysuria.    /80   Ht 5' 6" (1.676 m)   Wt 126 kg (277 lb 12.5 oz)   BMI 44.83 kg/m²     PE:  APPEARANCE: Well nourished, well developed, in no acute distress.  AFFECT: WNL, alert and oriented x 3.  SKIN: No acne or hirsutism.  NECK: Neck symmetric, without masses or thyromegaly.  NODES: No inguinal, cervical, axillary or femoral lymph node enlargement.  CHEST: Good respiratory effort.   ABDOMEN: Soft. No tenderness or masses. No hepatosplenomegaly. No hernias.  BREASTS: Symmetrical, no skin changes, visible lesions, palpable masses or nipple discharge bilaterally.  PELVIC: External female genitalia without lesions.  Female hair distribution. Adequate perineal body, Normal urethral meatus. Vagina moist and well rugated without lesions or discharge.  No significant cystocele or rectocele present. Cervix pink without lesions, discharge or tenderness. Uterus is 4-6 week size, regular, mobile and nontender. Adnexa without masses or tenderness.  EXTREMITIES: No edema    DIAGNOSIS:  1. Visit for screening mammogram    2. Screen for STD (sexually transmitted disease)        PLAN:    Orders Placed This Encounter    C. trachomatis/N. gonorrhoeae by AMP DNA    Vaginosis Screen by DNA Probe    Mammo Digital Screening Bilat       COUNSELING:  The patient was counseled today on:  -A.C.S. Pap and pelvic exam guidelines (pap every 3 years), recomendations for yearly mammogram;  -to follow up with her PCP for other health maintenance.    FOLLOW-UP with me for Ambien for pmb  "

## 2019-08-15 ENCOUNTER — TELEPHONE (OUTPATIENT)
Dept: OBSTETRICS AND GYNECOLOGY | Facility: CLINIC | Age: 64
End: 2019-08-15

## 2019-08-15 DIAGNOSIS — N95.0 PMB (POSTMENOPAUSAL BLEEDING): Primary | ICD-10-CM

## 2019-08-15 NOTE — TELEPHONE ENCOUNTER
[place] [facility] [place]     [place] with intermittent PMB since May. Today reports sharp abdominal pain 8/10, no radiation, pelvic & lower back pain, no N&V, no pain.   Patient described as menstrual like cramping and did begin with onset on vaginal spotting. She has taken no meds, we discussed NSAIDS for pain.  Patient given strict pain precautions given.     Fibroid uterus. No visible endometrial abnormality. Left ovary within normal limits, but right ovary not visualized.    Preliminary Report Dictated By: Bhupendra Dumont MD    Electronically Signed By: Jones Powers MD 5/2/2019 10:34 AM CDT   Result Narrative   [facility] [place]  EXAM END TIME: 5/1/2019 04:53 PM  CLINICAL HISTORY: N95.0   Postmenopausal bleeding       Last menstrual period: 2006    TECHNIQUE: Pelvic ultrasound. Images were acquired transabdominally, obtained in grayscale and color Doppler. Examination limited secondary to patient habitus.  COMPARISON: None.       FINDINGS:    Uterus:     Dimensions: 10.5 x 5.8 x 8.6 cm     Parenchyma:  Somewhat large. Multiple hypoechoic masses are noted consistent with fibroids.     Endometrium:        Thickness: 5 mm. The study does not suggest any concerning endometrial finding, although the endometrium is only moderately seen secondary to the fibroid character.        Parenchyma: Normal in thickness and appearance for postmenopausal status.    Cervix: Not well seen.    RIGHT ovary: Not visualized.    LEFT ovary:     Dimensions: 4.7 x 1.7 x 2.2 cm     Parenchyma: Normal in shape, size, and appearance for age and menstrual status. No masses are noted.       Flow: Within normal limits.    Peritoneum/Retroperitoneum: No significant fluid is seen in the CUL-DE-SAC.

## 2019-08-15 NOTE — TELEPHONE ENCOUNTER
----- Message from Montserrat Quezada sent at 8/15/2019 10:47 AM CDT -----  Contact: pt  Name of Who is Calling: BLANE VALDIVIA [[id number]]      What is the request in detail: pt state that she is having cramping in her back and abdominal area and would like to speak with staff. Please contact to further discuss and advise.      Can the clinic reply by [facility]: n       What [facility]: [phone]

## 2019-08-15 NOTE — TELEPHONE ENCOUNTER
Dr Solis spoke to the pt about the pain she is having  Pt states no nausea or vomiting . Pt was advised to keep her appt for Monday.

## 2019-08-19 ENCOUNTER — PROCEDURE VISIT (OUTPATIENT)
Dept: OBSTETRICS AND GYNECOLOGY | Facility: CLINIC | Age: 64
End: 2019-08-19
Payer: MEDICAID

## 2019-08-19 VITALS
WEIGHT: 277.75 LBS | SYSTOLIC BLOOD PRESSURE: 110 MMHG | HEIGHT: 66 IN | DIASTOLIC BLOOD PRESSURE: 80 MMHG | BODY MASS INDEX: 44.64 KG/M2

## 2019-08-19 DIAGNOSIS — N93.8 DUB (DYSFUNCTIONAL UTERINE BLEEDING): Primary | ICD-10-CM

## 2019-08-19 DIAGNOSIS — N95.0 PMB (POSTMENOPAUSAL BLEEDING): ICD-10-CM

## 2019-08-19 LAB
B-HCG UR QL: NEGATIVE
CTP QC/QA: YES

## 2019-08-19 PROCEDURE — 88305 TISSUE SPECIMEN TO PATHOLOGY, OBSTETRICS/GYNECOLOGY: ICD-10-PCS | Mod: 26,,, | Performed by: PATHOLOGY

## 2019-08-19 PROCEDURE — 58100 BIOPSY (GYNECOLOGICAL): ICD-10-PCS | Mod: S$PBB,,, | Performed by: OBSTETRICS & GYNECOLOGY

## 2019-08-19 PROCEDURE — 88305 TISSUE EXAM BY PATHOLOGIST: CPT | Performed by: PATHOLOGY

## 2019-08-19 PROCEDURE — 58100 BIOPSY OF UTERUS LINING: CPT | Mod: PBBFAC | Performed by: OBSTETRICS & GYNECOLOGY

## 2019-08-19 PROCEDURE — 81025 URINE PREGNANCY TEST: CPT | Mod: PBBFAC | Performed by: OBSTETRICS & GYNECOLOGY

## 2019-08-19 PROCEDURE — 88305 TISSUE EXAM BY PATHOLOGIST: CPT | Mod: 26,,, | Performed by: PATHOLOGY

## 2019-08-19 NOTE — PROCEDURES
Biopsy (Gynecological)  Date/Time: 8/19/2019 12:23 PM  Performed by: Komal Solis MD  Authorized by: Komal Solis MD     Consent Done?:  Yes (Written)   Patient was prepped and draped in the normal sterile fashion.  Local anesthesia used?: No      Biopsy Location:  Uterus  Estimated blood loss (cc):  10   Patient tolerated the procedure well with no immediate complications.     Pelvic rest for 2 weeks. Bleeding, pain and fever precautions given.     Sounded to 6 cm, minimal tissue    Fibroid uterus. No visible endometrial abnormality. Left ovary within normal limits, but right ovary not visualized.    Preliminary Report Dictated By: Bhupendra Dumont MD    Electronically Signed By: Jones Powers MD 5/2/2019 10:34 AM CDT   Result Narrative   [facility] [place]  EXAM END TIME: 5/1/2019 04:53 PM  CLINICAL HISTORY: N95.0   Postmenopausal bleeding       Last menstrual period: 2006    TECHNIQUE: Pelvic ultrasound. Images were acquired transabdominally, obtained in grayscale and color Doppler. Examination limited secondary to patient habitus.  COMPARISON: None.       FINDINGS:    Uterus:     Dimensions: 10.5 x 5.8 x 8.6 cm     Parenchyma:  Somewhat large. Multiple hypoechoic masses are noted consistent with fibroids.     Endometrium:        Thickness: 5 mm. The study does not suggest any concerning endometrial finding, although the endometrium is only moderately seen secondary to the fibroid character.        Parenchyma: Normal in thickness and appearance for postmenopausal status.    Cervix: Not well seen.    RIGHT ovary: Not visualized.    LEFT ovary:     Dimensions: 4.7 x 1.7 x 2.2 cm     Parenchyma: Normal in shape, size, and appearance for age and menstrual status. No masses are noted.       Flow: Within normal limits.    Peritoneum/Retroperitoneum: No significant fluid is seen in the CUL-DE-SAC.

## 2019-08-19 NOTE — PATIENT INSTRUCTIONS
[facility] Internal Medicine   Dr. Nanette Blum ([facility])   Dr. Aguirre   [address] [facility] [facility] [address]   Phone: [phone]   Fax: [phone]

## 2019-09-05 ENCOUNTER — OFFICE VISIT (OUTPATIENT)
Dept: OBSTETRICS AND GYNECOLOGY | Facility: CLINIC | Age: 64
End: 2019-09-05
Payer: MEDICAID

## 2019-09-05 VITALS — WEIGHT: 282.19 LBS | HEIGHT: 66 IN | BODY MASS INDEX: 45.35 KG/M2

## 2019-09-05 DIAGNOSIS — D25.9 UTERINE LEIOMYOMA, UNSPECIFIED LOCATION: ICD-10-CM

## 2019-09-05 DIAGNOSIS — N88.2 CERVICAL STENOSIS (UTERINE CERVIX): ICD-10-CM

## 2019-09-05 DIAGNOSIS — N95.0 PMB (POSTMENOPAUSAL BLEEDING): Primary | ICD-10-CM

## 2019-09-05 PROCEDURE — 99999 PR PBB SHADOW E&M-EST. PATIENT-LVL III: CPT | Mod: PBBFAC,,, | Performed by: OBSTETRICS & GYNECOLOGY

## 2019-09-05 PROCEDURE — 99213 OFFICE O/P EST LOW 20 MIN: CPT | Mod: PBBFAC | Performed by: OBSTETRICS & GYNECOLOGY

## 2019-09-05 PROCEDURE — 99213 PR OFFICE/OUTPT VISIT, EST, LEVL III, 20-29 MIN: ICD-10-PCS | Mod: S$PBB,,, | Performed by: OBSTETRICS & GYNECOLOGY

## 2019-09-05 PROCEDURE — 99213 OFFICE O/P EST LOW 20 MIN: CPT | Mod: S$PBB,,, | Performed by: OBSTETRICS & GYNECOLOGY

## 2019-09-05 PROCEDURE — 99999 PR PBB SHADOW E&M-EST. PATIENT-LVL III: ICD-10-PCS | Mod: PBBFAC,,, | Performed by: OBSTETRICS & GYNECOLOGY

## 2019-09-05 RX ORDER — CLOTRIMAZOLE 1 %
CREAM (GRAM) TOPICAL
Refills: 0 | COMMUNITY
Start: 2019-08-31

## 2019-09-05 RX ORDER — FLUCONAZOLE 150 MG/1
TABLET ORAL
Refills: 1 | COMMUNITY
Start: 2019-08-31 | End: 2019-11-22

## 2019-09-11 ENCOUNTER — PATIENT MESSAGE (OUTPATIENT)
Dept: SURGERY | Facility: OTHER | Age: 64
End: 2019-09-11

## 2019-09-11 DIAGNOSIS — B96.89 BV (BACTERIAL VAGINOSIS): Primary | ICD-10-CM

## 2019-09-11 DIAGNOSIS — N76.0 BV (BACTERIAL VAGINOSIS): Primary | ICD-10-CM

## 2019-09-11 NOTE — TELEPHONE ENCOUNTER
----- Message from Francia Mejias sent at 9/11/2019 11:40 AM CDT -----  Contact: BLANE VALDIVIA [[id number]]  Name of Who is Calling: BLANE VALDIVIA [[id number]]    What is the request in detail: Would like to speak with staff in regards to sending over pap smear results. Please contact to further discuss and advise      Can the clinic reply by [facility]: no    What Number to Call Back if not in [facility]: [phone]

## 2019-09-11 NOTE — TELEPHONE ENCOUNTER
----- Message from Carmen Nicole sent at 9/11/2019  1:16 PM CDT -----  Contact: pt  Type:  Patient Returning Call    Who Called: BLANE VALDIVIA [[id number]]    Who Left Message for Patient: Ilda Edmondson MA    Does the patient know what this is regarding?: unknown    Best Call Back Number:[phone]    Additional Information: no

## 2019-09-11 NOTE — TELEPHONE ENCOUNTER
I spoke to the pt and informed her that she needs to be here Friday morning for 9 am and she has medicine that will be sent to pharmacy

## 2019-09-12 ENCOUNTER — HOSPITAL ENCOUNTER (OUTPATIENT)
Dept: PREADMISSION TESTING | Facility: OTHER | Age: 64
Discharge: HOME | End: 2019-09-12
Attending: OBSTETRICS & GYNECOLOGY
Payer: MEDICAID

## 2019-09-12 ENCOUNTER — TELEPHONE (OUTPATIENT)
Dept: OBSTETRICS AND GYNECOLOGY | Facility: CLINIC | Age: 64
End: 2019-09-12

## 2019-09-12 ENCOUNTER — ANESTHESIA EVENT (OUTPATIENT)
Dept: SURGERY | Facility: OTHER | Age: 64
End: 2019-09-12
Payer: MEDICAID

## 2019-09-12 ENCOUNTER — TELEPHONE (OUTPATIENT)
Dept: PHARMACY | Facility: CLINIC | Age: 64
End: 2019-09-12

## 2019-09-12 VITALS
WEIGHT: 277 LBS | DIASTOLIC BLOOD PRESSURE: 65 MMHG | TEMPERATURE: 98 F | HEART RATE: 66 BPM | BODY MASS INDEX: 44.52 KG/M2 | OXYGEN SATURATION: 96 % | HEIGHT: 66 IN | SYSTOLIC BLOOD PRESSURE: 137 MMHG

## 2019-09-12 DIAGNOSIS — Z01.818 PREOP TESTING: Primary | ICD-10-CM

## 2019-09-12 LAB
BASOPHILS # BLD AUTO: 0.03 K/UL (ref 0–0.2)
BASOPHILS NFR BLD: 0.4 % (ref 0–1.9)
DIFFERENTIAL METHOD: ABNORMAL
EOSINOPHIL # BLD AUTO: 0.1 K/UL (ref 0–0.5)
EOSINOPHIL NFR BLD: 0.7 % (ref 0–8)
ERYTHROCYTE [DISTWIDTH] IN BLOOD BY AUTOMATED COUNT: 14.1 % (ref 11.5–14.5)
HCT VFR BLD AUTO: 40.6 % (ref 37–48.5)
HGB BLD-MCNC: 13.3 G/DL (ref 12–16)
IMM GRANULOCYTES # BLD AUTO: 0.02 K/UL (ref 0–0.04)
IMM GRANULOCYTES NFR BLD AUTO: 0.3 % (ref 0–0.5)
LYMPHOCYTES # BLD AUTO: 3.2 K/UL (ref 1–4.8)
LYMPHOCYTES NFR BLD: 46.6 % (ref 18–48)
MCH RBC QN AUTO: 31.1 PG (ref 27–31)
MCHC RBC AUTO-ENTMCNC: 32.8 G/DL (ref 32–36)
MCV RBC AUTO: 95 FL (ref 82–98)
MONOCYTES # BLD AUTO: 0.6 K/UL (ref 0.3–1)
MONOCYTES NFR BLD: 8.4 % (ref 4–15)
NEUTROPHILS # BLD AUTO: 3 K/UL (ref 1.8–7.7)
NEUTROPHILS NFR BLD: 43.6 % (ref 38–73)
NRBC BLD-RTO: 0 /100 WBC
PLATELET # BLD AUTO: 266 K/UL (ref 150–350)
PMV BLD AUTO: 10.8 FL (ref 9.2–12.9)
RBC # BLD AUTO: 4.28 M/UL (ref 4–5.4)
WBC # BLD AUTO: 6.78 K/UL (ref 3.9–12.7)

## 2019-09-12 PROCEDURE — 85025 COMPLETE CBC W/AUTO DIFF WBC: CPT

## 2019-09-12 PROCEDURE — 36415 COLL VENOUS BLD VENIPUNCTURE: CPT

## 2019-09-12 RX ORDER — SODIUM CHLORIDE, SODIUM LACTATE, POTASSIUM CHLORIDE, CALCIUM CHLORIDE 600; 310; 30; 20 MG/100ML; MG/100ML; MG/100ML; MG/100ML
INJECTION, SOLUTION INTRAVENOUS CONTINUOUS
Status: CANCELLED | OUTPATIENT
Start: 2019-09-12

## 2019-09-12 RX ORDER — PREGABALIN 75 MG/1
75 CAPSULE ORAL ONCE
Status: CANCELLED | OUTPATIENT
Start: 2019-09-12 | End: 2019-09-12

## 2019-09-12 RX ORDER — LIDOCAINE HYDROCHLORIDE 10 MG/ML
0.5 INJECTION, SOLUTION EPIDURAL; INFILTRATION; INTRACAUDAL; PERINEURAL ONCE
Status: CANCELLED | OUTPATIENT
Start: 2019-09-12 | End: 2019-09-12

## 2019-09-12 RX ORDER — TINIDAZOLE 500 MG/1
2 TABLET ORAL ONCE
Qty: 8 TABLET | Refills: 0 | Status: SHIPPED | OUTPATIENT
Start: 2019-09-12 | End: 2019-09-14

## 2019-09-12 NOTE — TELEPHONE ENCOUNTER
----- Message from Chrissy Ring sent at 9/12/2019  9:23 AM CDT -----  Contact: BLANE VALDIVIA [[id number]]  Name of Who is Calling : BLANE VALDIVIA [[id number]]    What is the request in detail :     Patient is requesting a call from staff in regards to antibiotics that were to be called into her pharmacy   .....Please contact to further discuss and advise.    Can the clinic reply by [facility] :  Phone call only    What Number to Call Back : [phone]

## 2019-09-12 NOTE — PROGRESS NOTES
.Formerly [facility], 1928–1983  HISTORY OF PRESENT ILLNESS:    Denisha Becker is a 64 y.o. female, , Patient's last menstrual period was 2019.,  presents for a pre-op exam for D&C/Hysterscope on 2019.     Patient with history of PMB, initially seen at [facility], see US below.   Patient with intermittent PMB since May. Today reports sharp abdominal pain 8/10, no radiation, pelvic & lower back pain, no N&V, no pain.   Patient described as menstrual like cramping and did begin with onset on vaginal spotting. She had taken no meds initially, pain subsequently treated with NSAIDS for pain.      Transferred care here with continued VB almost daily. Conservative, medical therapy and surgical options discussed in detail.  Patient desires to proceed with evaluation with D&C/Hysteroscopy.        Result Narrative   [facility] [place] US PELVIS COMPLETE  EXAM END TIME: 2019 04:53 PM  CLINICAL HISTORY: N95.0   Postmenopausal bleeding       Last menstrual period:     TECHNIQUE: Pelvic ultrasound. Images were acquired transabdominally, obtained in grayscale and color Doppler. Examination limited secondary to patient habitus.  COMPARISON: None.       FINDINGS:    Uterus:     Dimensions: 10.5 x 5.8 x 8.6 cm     Parenchyma:  Somewhat large. Multiple hypoechoic masses are noted consistent with fibroids.     Endometrium:        Thickness: 5 mm. The study does not suggest any concerning endometrial finding, although the endometrium is only moderately seen secondary to the fibroid character.        Parenchyma: Normal in thickness and appearance for postmenopausal status.    Cervix: Not well seen.    RIGHT ovary: Not visualized.    LEFT ovary:     Dimensions: 4.7 x 1.7 x 2.2 cm     Parenchyma: Normal in shape, size, and appearance for age and menstrual status. No masses are noted.       Flow: Within normal limits.    Peritoneum/Retroperitoneum: No significant fluid is seen in the CUL-DE-SAC.           Past Medical History:   Diagnosis Date     Arthritis     right knee    Diverticulitis        Past Surgical History:   Procedure Laterality Date     SECTION, CLASSIC      WISDOM TOOTH EXTRACTION         MEDICATIONS AND ALLERGIES:      Current Outpatient Medications:     cetirizine (ZYRTEC) 10 MG tablet, TK 1 T PO QD FOR ALLERGIES, Disp: , Rfl: 1    clotrimazole (LOTRIMIN) 1 % cream, GERMAN EXT AA BID FOR 7 DAYS, Disp: , Rfl: 0    fluconazole (DIFLUCAN) 150 MG Tab, TK 1 T PO QD FOR 1 DAY, Disp: , Rfl: 1    tinidazole (TINDAMAX) 500 MG tablet, Take 4 tablets today & 4 tablets tomorrow. for 1 dose, Disp: 8 tablet, Rfl: 0    Review of patient's allergies indicates:  No Known Allergies    Family History   Problem Relation Age of Onset    Breast cancer Neg Hx     Cancer Neg Hx     Ovarian cancer Neg Hx        Social History     Socioeconomic History    Marital status: Single     Spouse name: Not on file    Number of children: Not on file    Years of education: Not on file    Highest education level: Not on file   Occupational History    Not on file   Social Needs    Financial resource strain: Not on file    Food insecurity:     Worry: Not on file     Inability: Not on file    Transportation needs:     Medical: Not on file     Non-medical: Not on file   Tobacco Use    Smoking status: Current Every Day Smoker     Packs/day: 1.00     Types: Cigarettes, Cigars    Smokeless tobacco: Never Used   Substance and Sexual Activity    Alcohol use: Yes    Drug use: No    Sexual activity: Yes     Partners: Male     Birth control/protection: None, Post-menopausal   Lifestyle    Physical activity:     Days per week: Not on file     Minutes per session: Not on file    Stress: Not on file   Relationships    Social connections:     Talks on phone: Not on file     Gets together: Not on file     Attends Taoist service: Not on file     Active member of club or organization: Not on file     Attends meetings of clubs or organizations: Not on file  "    Relationship status: Not on file   Other Topics Concern    Not on file   Social History Narrative    Not on file       COMPREHENSIVE GYN HISTORY:  PAP History: Denies abnormal Paps.  Infection History: Denies STDs. Denies PID.  Benign History: Denies uterine fibroids. Denies ovarian cysts. Denies endometriosis. Denies other conditions.  Cancer History: Denies cervical cancer. Denies uterine cancer or hyperplasia. Denies ovarian cancer. Denies vulvar cancer or pre-cancer. Denies vaginal cancer or pre-cancer. Denies breast cancer. Denies colon cancer.  Sexual Activity History: Reports currently being sexually active  Menstrual History: Monthly. Mod then light flow.   Dysmenorrhea History: Reports mild dysmenorrhea.       ROS:  GENERAL: No weight changes. No swelling. No fatigue. No fever.  CARDIOVASCULAR: No chest pain. No shortness of breath. No leg cramps.   NEUROLOGICAL: No headaches. No vision changes.  BREASTS: No pain. No lumps. No discharge.  ABDOMEN: No pain. No nausea. No vomiting. No diarrhea. No constipation.  REPRODUCTIVE: No abnormal bleeding.   VULVA: No pain. No lesions. No itching.  VAGINA: No relaxation. No itching. No odor. No discharge. No lesions.  URINARY: No incontinence. No nocturia. No frequency. No dysuria.    Ht 5' 6" (1.676 m)   Wt 128 kg (282 lb 3 oz)   LMP 09/02/2019   BMI 45.55 kg/m²     PE:  APPEARANCE: Well nourished, well developed, in no acute distress.  AFFECT: WNL, alert and oriented x 3.  SKIN: No acne or hirsutism.  NECK: Neck symmetric, without masses or thyromegaly.  NODES: No inguinal, cervical, axillary or femoral lymph node enlargement.  CHEST: Good respiratory effort.   ABDOMEN: Soft. No tenderness or masses. No hepatosplenomegaly. No hernias.  BREASTS: Symmetrical, no skin changes, visible lesions, palpable masses or nipple discharge bilaterally.  PELVIC: External female genitalia without lesions.  Female hair distribution. Adequate perineal body, Normal urethral " meatus. Vagina moist and well rugated without lesions or discharge.  No significant cystocele or rectocele present. Cervix pink without lesions, discharge or tenderness. Uterus is 4-6 week size, regular, mobile and nontender. Adnexa without masses or tenderness.  EXTREMITIES: No edema    DIAGNOSIS:  1. PMB (postmenopausal bleeding)    2. Cervical stenosis (uterine cervix)    3. Uterine leiomyoma, unspecified location        PLAN:    Orders Placed This Encounter    Case Request Operating Room: HYSTEROSCOPY, WITH DILATION AND CURETTAGE OF UTERUS       COUNSELING:  Long discussion held patient today concerning her surgery, hospital course on the day of surgery and post operative course. Precautions after surgery discussed in detail.  Risks, alternatives and benefits of surgery discussed with patient in detail and consent explained in detail. Questions were answered and patient voices understanding.    Plan D&C/Hysteroscope on 9/13/2019     FOLLOW-UP with me for post op visit.

## 2019-09-12 NOTE — H&P (VIEW-ONLY)
.[facility]  HISTORY OF PRESENT ILLNESS:    Denisha Becker is a 64 y.o. female, , Patient's last menstrual period was 2019.,  presents for a pre-op exam for D&C/Hysterscope on 2019.     Patient with history of PMB, initially seen at [facility], see US below.   Patient with intermittent PMB since May. Today reports sharp abdominal pain 8/10, no radiation, pelvic & lower back pain, no N&V, no pain.   Patient described as menstrual like cramping and did begin with onset on vaginal spotting. She had taken no meds initially, pain subsequently treated with NSAIDS for pain.      Transferred care here with continued VB almost daily. Conservative, medical therapy and surgical options discussed in detail.  Patient desires to proceed with evaluation with D&C/Hysteroscopy.        Result Narrative   [facility] [place] US PELVIS COMPLETE  EXAM END TIME: 2019 04:53 PM  CLINICAL HISTORY: N95.0   Postmenopausal bleeding       Last menstrual period:     TECHNIQUE: Pelvic ultrasound. Images were acquired transabdominally, obtained in grayscale and color Doppler. Examination limited secondary to patient habitus.  COMPARISON: None.       FINDINGS:    Uterus:     Dimensions: 10.5 x 5.8 x 8.6 cm     Parenchyma:  Somewhat large. Multiple hypoechoic masses are noted consistent with fibroids.     Endometrium:        Thickness: 5 mm. The study does not suggest any concerning endometrial finding, although the endometrium is only moderately seen secondary to the fibroid character.        Parenchyma: Normal in thickness and appearance for postmenopausal status.    Cervix: Not well seen.    RIGHT ovary: Not visualized.    LEFT ovary:     Dimensions: 4.7 x 1.7 x 2.2 cm     Parenchyma: Normal in shape, size, and appearance for age and menstrual status. No masses are noted.       Flow: Within normal limits.    Peritoneum/Retroperitoneum: No significant fluid is seen in the CUL-DE-SAC.           Past Medical History:   Diagnosis Date     Arthritis     right knee    Diverticulitis        Past Surgical History:   Procedure Laterality Date     SECTION, CLASSIC      WISDOM TOOTH EXTRACTION         MEDICATIONS AND ALLERGIES:      Current Outpatient Medications:     cetirizine (ZYRTEC) 10 MG tablet, TK 1 T PO QD FOR ALLERGIES, Disp: , Rfl: 1    clotrimazole (LOTRIMIN) 1 % cream, GERMAN EXT AA BID FOR 7 DAYS, Disp: , Rfl: 0    fluconazole (DIFLUCAN) 150 MG Tab, TK 1 T PO QD FOR 1 DAY, Disp: , Rfl: 1    tinidazole (TINDAMAX) 500 MG tablet, Take 4 tablets today & 4 tablets tomorrow. for 1 dose, Disp: 8 tablet, Rfl: 0    Review of patient's allergies indicates:  No Known Allergies    Family History   Problem Relation Age of Onset    Breast cancer Neg Hx     Cancer Neg Hx     Ovarian cancer Neg Hx        Social History     Socioeconomic History    Marital status: Single     Spouse name: Not on file    Number of children: Not on file    Years of education: Not on file    Highest education level: Not on file   Occupational History    Not on file   Social Needs    Financial resource strain: Not on file    Food insecurity:     Worry: Not on file     Inability: Not on file    Transportation needs:     Medical: Not on file     Non-medical: Not on file   Tobacco Use    Smoking status: Current Every Day Smoker     Packs/day: 1.00     Types: Cigarettes, Cigars    Smokeless tobacco: Never Used   Substance and Sexual Activity    Alcohol use: Yes    Drug use: No    Sexual activity: Yes     Partners: Male     Birth control/protection: None, Post-menopausal   Lifestyle    Physical activity:     Days per week: Not on file     Minutes per session: Not on file    Stress: Not on file   Relationships    Social connections:     Talks on phone: Not on file     Gets together: Not on file     Attends Restorationist service: Not on file     Active member of club or organization: Not on file     Attends meetings of clubs or organizations: Not on file  "    Relationship status: Not on file   Other Topics Concern    Not on file   Social History Narrative    Not on file       COMPREHENSIVE GYN HISTORY:  PAP History: Denies abnormal Paps.  Infection History: Denies STDs. Denies PID.  Benign History: Denies uterine fibroids. Denies ovarian cysts. Denies endometriosis. Denies other conditions.  Cancer History: Denies cervical cancer. Denies uterine cancer or hyperplasia. Denies ovarian cancer. Denies vulvar cancer or pre-cancer. Denies vaginal cancer or pre-cancer. Denies breast cancer. Denies colon cancer.  Sexual Activity History: Reports currently being sexually active  Menstrual History: Monthly. Mod then light flow.   Dysmenorrhea History: Reports mild dysmenorrhea.       ROS:  GENERAL: No weight changes. No swelling. No fatigue. No fever.  CARDIOVASCULAR: No chest pain. No shortness of breath. No leg cramps.   NEUROLOGICAL: No headaches. No vision changes.  BREASTS: No pain. No lumps. No discharge.  ABDOMEN: No pain. No nausea. No vomiting. No diarrhea. No constipation.  REPRODUCTIVE: No abnormal bleeding.   VULVA: No pain. No lesions. No itching.  VAGINA: No relaxation. No itching. No odor. No discharge. No lesions.  URINARY: No incontinence. No nocturia. No frequency. No dysuria.    Ht 5' 6" (1.676 m)   Wt 128 kg (282 lb 3 oz)   LMP 09/02/2019   BMI 45.55 kg/m²     PE:  APPEARANCE: Well nourished, well developed, in no acute distress.  AFFECT: WNL, alert and oriented x 3.  SKIN: No acne or hirsutism.  NECK: Neck symmetric, without masses or thyromegaly.  NODES: No inguinal, cervical, axillary or femoral lymph node enlargement.  CHEST: Good respiratory effort.   ABDOMEN: Soft. No tenderness or masses. No hepatosplenomegaly. No hernias.  BREASTS: Symmetrical, no skin changes, visible lesions, palpable masses or nipple discharge bilaterally.  PELVIC: External female genitalia without lesions.  Female hair distribution. Adequate perineal body, Normal urethral " meatus. Vagina moist and well rugated without lesions or discharge.  No significant cystocele or rectocele present. Cervix pink without lesions, discharge or tenderness. Uterus is 4-6 week size, regular, mobile and nontender. Adnexa without masses or tenderness.  EXTREMITIES: No edema    DIAGNOSIS:  1. PMB (postmenopausal bleeding)    2. Cervical stenosis (uterine cervix)    3. Uterine leiomyoma, unspecified location        PLAN:    Orders Placed This Encounter    Case Request Operating Room: HYSTEROSCOPY, WITH DILATION AND CURETTAGE OF UTERUS       COUNSELING:  Long discussion held patient today concerning her surgery, hospital course on the day of surgery and post operative course. Precautions after surgery discussed in detail.  Risks, alternatives and benefits of surgery discussed with patient in detail and consent explained in detail. Questions were answered and patient voices understanding.    Plan D&C/Hysteroscope on 9/13/2019     FOLLOW-UP with me for post op visit.

## 2019-09-12 NOTE — TELEPHONE ENCOUNTER
I spoke the pt this morning and informed her that the medicine was sent to [facility] for her to .

## 2019-09-12 NOTE — ANESTHESIA PREPROCEDURE EVALUATION
09/12/2019  Denisha Becker is a 64 y.o., female.    Anesthesia Evaluation    I have reviewed the Patient Summary Reports.    I have reviewed the Nursing Notes.   I have reviewed the Medications.     Review of Systems  Anesthesia Hx:  No problems with previous Anesthesia  Denies Family Hx of Anesthesia complications.   Denies Personal Hx of Anesthesia complications.   Social:  Smoker    Hematology/Oncology:  Hematology Normal   Oncology Normal     EENT/Dental:EENT/Dental Normal   Cardiovascular:  Cardiovascular Normal     Pulmonary:  Pulmonary Normal    Renal/:  Renal/ Normal     Hepatic/GI:  Hepatic/GI Normal    Musculoskeletal:  Musculoskeletal Normal    Neurological:  Neurology Normal    Endocrine:  Endocrine Normal    Dermatological:  Skin Normal    Psych:  Psychiatric Normal           Physical Exam  General:  Morbid Obesity    Airway/Jaw/Neck:  Airway Findings: Mouth Opening: Normal Mallampati: III      Dental:  Dental Findings: In tact        Mental Status:  Mental Status Findings:  Cooperative, Alert and Oriented         Anesthesia Plan  Type of Anesthesia, risks & benefits discussed:  Anesthesia Type:  general  Patient's Preference:   Intra-op Monitoring Plan: standard ASA monitors  Intra-op Monitoring Plan Comments:   Post Op Pain Control Plan: multimodal analgesia  Post Op Pain Control Plan Comments:   Induction:   IV  Beta Blocker:         Informed Consent: Patient understands risks and agrees with Anesthesia plan.  Questions answered. Anesthesia consent signed with patient.  ASA Score: 3     Day of Surgery Review of History & Physical:    H&P update referred to the surgeon.     Anesthesia Plan Notes: CBC today        Ready For Surgery From Anesthesia Perspective.

## 2019-09-12 NOTE — TELEPHONE ENCOUNTER
I spoke to the pt and informed her that her medicine was sent to the pharmacy for  at the [facility] location.

## 2019-09-12 NOTE — DISCHARGE INSTRUCTIONS
PRE-ADMIT TESTING -  [phone]    [address]  [facility]          Your surgery has been scheduled at [facility]. We are pleased to have the opportunity to serve you. For Further Information please call [phone].    On the day of surgery please report to the Information Desk on the 1st floor.    · CONTACT YOUR PHYSICIAN'S OFFICE THE DAY PRIOR TO YOUR SURGERY TO OBTAIN YOUR ARRIVAL TIME.     · The evening before surgery do not eat anything after 9 p.m. ( this includes hard candy, chewing gum and mints).  You may only have GATORADE, POWERADE AND WATER  from 9 p.m. until you leave your home.   DO NOT DRINK ANY LIQUIDS ON THE WAY TO THE HOSPITAL.      SPECIAL MEDICATION INSTRUCTIONS: TAKE medications checked off by the Anesthesiologist on your Medication List.    Angiogram Patients: Take medications as instructed by your physician, including aspirin.     Surgery Patients:    If you take ASPIRIN - Your PHYSICIAN/SURGEON will need to inform you IF/OR when you need to stop taking aspirin prior to your surgery.     Do Not take any medications containing IBUPROFEN.  Do Not Wear any make-up or dark nail polish   (especially eye make-up) to surgery. If you come to surgery with makeup on you will be required to remove the makeup or nail polish.    Do not shave your surgical area at least 5 days prior to your surgery. The surgical prep will be performed at the hospital according to Infection Control regulations.    Leave all valuables at home.   Do Not wear any jewelry or watches, including any metal in body piercings. Jewelry must be removed prior to coming to the hospital.  There is a possibility that rings that are unable to be removed may be cut off if they are on the surgical extremity.    Contact Lens must be removed before surgery. Either do not wear the contact lens or bring a case and solution for storage.  Please bring a container for eyeglasses or dentures as required.  Bring  any paperwork your physician has provided, such as consent forms,  history and physicals, doctor's orders, etc.   Bring comfortable clothes that are loose fitting to wear upon discharge. Take into consideration the type of surgery being performed.  Maintain your diet as advised per your physician the day prior to surgery.      Adequate rest the night before surgery is advised.   Park in the Parking lot behind the hospital or in the [place] [facility] across the street from the parking lot. Parking is complimentary.  If you will be discharged the same day as your procedure, please arrange for a responsible adult to drive you home or to accompany you if traveling by taxi.   YOU WILL NOT BE PERMITTED TO DRIVE OR TO LEAVE THE HOSPITAL ALONE AFTER SURGERY.   It is strongly recommended that you arrange for someone to remain with you for the first 24 hrs following your surgery.    The Surgeon will speak to your family/visitor after your surgery regarding the outcome of your surgery and post op care.  The Surgeon may speak to you after your surgery, but there is a possibility you may not remember the details.  Please check with your family members regarding the conversation with the Surgeon.    We strongly recommend whoever is bringing you home be present for discharge instructions.  This will ensure a thorough understanding for your post op home care.      Thank you for your cooperation.  The Staff of [facility].                Bathing Instructions with Hibiclens     Shower the evening before and morning of your procedure with Hibiclens:   Wash your face with water and your regular face wash/soap   Apply Hibiclens directly on your skin or on a wet washcloth and wash gently. When showering: Move away from the shower stream when applying Hibiclens to avoid rinsing off too soon.   Rinse thoroughly with warm water   Do not dilute Hibiclens         Dry off as usual, do not use any deodorant,  powder, body lotions, perfume, after shave or cologne.

## 2019-09-13 ENCOUNTER — ANESTHESIA (OUTPATIENT)
Dept: SURGERY | Facility: OTHER | Age: 64
End: 2019-09-13
Payer: MEDICAID

## 2019-09-13 ENCOUNTER — HOSPITAL ENCOUNTER (OUTPATIENT)
Facility: OTHER | Age: 64
Discharge: HOME | End: 2019-09-13
Attending: OBSTETRICS & GYNECOLOGY | Admitting: OBSTETRICS & GYNECOLOGY
Payer: MEDICAID

## 2019-09-13 VITALS
WEIGHT: 277 LBS | HEIGHT: 66 IN | OXYGEN SATURATION: 97 % | SYSTOLIC BLOOD PRESSURE: 148 MMHG | RESPIRATION RATE: 18 BRPM | BODY MASS INDEX: 44.52 KG/M2 | TEMPERATURE: 98 F | DIASTOLIC BLOOD PRESSURE: 75 MMHG | HEART RATE: 65 BPM

## 2019-09-13 DIAGNOSIS — N95.0 PMB (POSTMENOPAUSAL BLEEDING): Primary | ICD-10-CM

## 2019-09-13 DIAGNOSIS — N88.2 CERVICAL STENOSIS (UTERINE CERVIX): ICD-10-CM

## 2019-09-13 LAB
ALBUMIN SERPL BCP-MCNC: 3.6 G/DL (ref 3.5–5.2)
ALP SERPL-CCNC: 70 U/L (ref 55–135)
ALT SERPL W/O P-5'-P-CCNC: 31 U/L (ref 10–44)
ANION GAP SERPL CALC-SCNC: 6 MMOL/L (ref 8–16)
AST SERPL-CCNC: 24 U/L (ref 10–40)
BASOPHILS # BLD AUTO: 0.02 K/UL (ref 0–0.2)
BASOPHILS NFR BLD: 0.2 % (ref 0–1.9)
BILIRUB SERPL-MCNC: 0.5 MG/DL (ref 0.1–1)
BUN SERPL-MCNC: 9 MG/DL (ref 8–23)
CALCIUM SERPL-MCNC: 9.1 MG/DL (ref 8.7–10.5)
CHLORIDE SERPL-SCNC: 111 MMOL/L (ref 95–110)
CO2 SERPL-SCNC: 24 MMOL/L (ref 23–29)
CREAT SERPL-MCNC: 0.9 MG/DL (ref 0.5–1.4)
DIFFERENTIAL METHOD: ABNORMAL
EOSINOPHIL # BLD AUTO: 0 K/UL (ref 0–0.5)
EOSINOPHIL NFR BLD: 0.4 % (ref 0–8)
ERYTHROCYTE [DISTWIDTH] IN BLOOD BY AUTOMATED COUNT: 14.1 % (ref 11.5–14.5)
EST. GFR  (AFRICAN AMERICAN): >60 ML/MIN/1.73 M^2
EST. GFR  (NON AFRICAN AMERICAN): >60 ML/MIN/1.73 M^2
GLUCOSE SERPL-MCNC: 99 MG/DL (ref 70–110)
HCT VFR BLD AUTO: 40.2 % (ref 37–48.5)
HGB BLD-MCNC: 12.9 G/DL (ref 12–16)
IMM GRANULOCYTES # BLD AUTO: 0.04 K/UL (ref 0–0.04)
IMM GRANULOCYTES NFR BLD AUTO: 0.5 % (ref 0–0.5)
LYMPHOCYTES # BLD AUTO: 2.5 K/UL (ref 1–4.8)
LYMPHOCYTES NFR BLD: 29.3 % (ref 18–48)
MCH RBC QN AUTO: 31.4 PG (ref 27–31)
MCHC RBC AUTO-ENTMCNC: 32.1 G/DL (ref 32–36)
MCV RBC AUTO: 98 FL (ref 82–98)
MONOCYTES # BLD AUTO: 0.3 K/UL (ref 0.3–1)
MONOCYTES NFR BLD: 3.9 % (ref 4–15)
NEUTROPHILS # BLD AUTO: 5.6 K/UL (ref 1.8–7.7)
NEUTROPHILS NFR BLD: 65.7 % (ref 38–73)
NRBC BLD-RTO: 0 /100 WBC
PLATELET # BLD AUTO: 222 K/UL (ref 150–350)
PMV BLD AUTO: 10.6 FL (ref 9.2–12.9)
POTASSIUM SERPL-SCNC: 4.7 MMOL/L (ref 3.5–5.1)
PROT SERPL-MCNC: 7.2 G/DL (ref 6–8.4)
RBC # BLD AUTO: 4.11 M/UL (ref 4–5.4)
SODIUM SERPL-SCNC: 141 MMOL/L (ref 136–145)
WBC # BLD AUTO: 8.52 K/UL (ref 3.9–12.7)

## 2019-09-13 PROCEDURE — 00952 ANES VAG PX HYSTSC&/HSG: CPT | Performed by: OBSTETRICS & GYNECOLOGY

## 2019-09-13 PROCEDURE — 88305 TISSUE EXAM BY PATHOLOGIST: CPT | Mod: 26,,, | Performed by: PATHOLOGY

## 2019-09-13 PROCEDURE — 85025 COMPLETE CBC W/AUTO DIFF WBC: CPT

## 2019-09-13 PROCEDURE — 25000003 PHARM REV CODE 250: Performed by: ANESTHESIOLOGY

## 2019-09-13 PROCEDURE — 63600175 PHARM REV CODE 636 W HCPCS: Performed by: ANESTHESIOLOGY

## 2019-09-13 PROCEDURE — 71000033 HC RECOVERY, INTIAL HOUR: Performed by: OBSTETRICS & GYNECOLOGY

## 2019-09-13 PROCEDURE — 80053 COMPREHEN METABOLIC PANEL: CPT

## 2019-09-13 PROCEDURE — 58558 PR HYSTEROSCOPY,W/ENDO BX: ICD-10-PCS | Mod: ,,, | Performed by: OBSTETRICS & GYNECOLOGY

## 2019-09-13 PROCEDURE — 71000039 HC RECOVERY, EACH ADD'L HOUR: Performed by: OBSTETRICS & GYNECOLOGY

## 2019-09-13 PROCEDURE — 37000009 HC ANESTHESIA EA ADD 15 MINS: Performed by: OBSTETRICS & GYNECOLOGY

## 2019-09-13 PROCEDURE — 88305 TISSUE EXAM BY PATHOLOGIST: CPT | Performed by: PATHOLOGY

## 2019-09-13 PROCEDURE — 37000008 HC ANESTHESIA 1ST 15 MINUTES: Performed by: OBSTETRICS & GYNECOLOGY

## 2019-09-13 PROCEDURE — 36000706: Performed by: OBSTETRICS & GYNECOLOGY

## 2019-09-13 PROCEDURE — 88305 TISSUE SPECIMEN TO PATHOLOGY - SURGERY: ICD-10-PCS | Mod: 26,,, | Performed by: PATHOLOGY

## 2019-09-13 PROCEDURE — 25000003 PHARM REV CODE 250: Performed by: NURSE ANESTHETIST, CERTIFIED REGISTERED

## 2019-09-13 PROCEDURE — C1782 MORCELLATOR: HCPCS | Performed by: OBSTETRICS & GYNECOLOGY

## 2019-09-13 PROCEDURE — 63600175 PHARM REV CODE 636 W HCPCS: Performed by: NURSE ANESTHETIST, CERTIFIED REGISTERED

## 2019-09-13 PROCEDURE — 25000003 PHARM REV CODE 250: Performed by: OBSTETRICS & GYNECOLOGY

## 2019-09-13 PROCEDURE — 71000015 HC POSTOP RECOV 1ST HR: Performed by: OBSTETRICS & GYNECOLOGY

## 2019-09-13 PROCEDURE — 58558 HYSTEROSCOPY BIOPSY: CPT | Mod: ,,, | Performed by: OBSTETRICS & GYNECOLOGY

## 2019-09-13 PROCEDURE — 36000707: Performed by: OBSTETRICS & GYNECOLOGY

## 2019-09-13 PROCEDURE — 71000016 HC POSTOP RECOV ADDL HR: Performed by: OBSTETRICS & GYNECOLOGY

## 2019-09-13 RX ORDER — IBUPROFEN 600 MG/1
600 TABLET ORAL 3 TIMES DAILY
Qty: 30 TABLET | Refills: 0 | Status: ON HOLD | OUTPATIENT
Start: 2019-09-13 | End: 2019-10-02 | Stop reason: HOSPADM

## 2019-09-13 RX ORDER — EPHEDRINE SULFATE 50 MG/ML
INJECTION, SOLUTION INTRAVENOUS
Status: DISCONTINUED | OUTPATIENT
Start: 2019-09-13 | End: 2019-09-13

## 2019-09-13 RX ORDER — ONDANSETRON 2 MG/ML
4 INJECTION INTRAMUSCULAR; INTRAVENOUS DAILY PRN
Status: DISCONTINUED | OUTPATIENT
Start: 2019-09-13 | End: 2019-09-13 | Stop reason: HOSPADM

## 2019-09-13 RX ORDER — CEFAZOLIN SODIUM 1 G/3ML
2 INJECTION, POWDER, FOR SOLUTION INTRAMUSCULAR; INTRAVENOUS
Status: DISCONTINUED | OUTPATIENT
Start: 2019-09-13 | End: 2019-09-13 | Stop reason: HOSPADM

## 2019-09-13 RX ORDER — ONDANSETRON 2 MG/ML
INJECTION INTRAMUSCULAR; INTRAVENOUS
Status: DISCONTINUED | OUTPATIENT
Start: 2019-09-13 | End: 2019-09-13

## 2019-09-13 RX ORDER — FENTANYL CITRATE 50 UG/ML
INJECTION, SOLUTION INTRAMUSCULAR; INTRAVENOUS
Status: DISCONTINUED | OUTPATIENT
Start: 2019-09-13 | End: 2019-09-13

## 2019-09-13 RX ORDER — SODIUM CHLORIDE 0.9 % (FLUSH) 0.9 %
3 SYRINGE (ML) INJECTION
Status: DISCONTINUED | OUTPATIENT
Start: 2019-09-13 | End: 2019-09-13 | Stop reason: HOSPADM

## 2019-09-13 RX ORDER — HYDROMORPHONE HYDROCHLORIDE 2 MG/ML
0.4 INJECTION, SOLUTION INTRAMUSCULAR; INTRAVENOUS; SUBCUTANEOUS EVERY 5 MIN PRN
Status: DISCONTINUED | OUTPATIENT
Start: 2019-09-13 | End: 2019-09-13 | Stop reason: HOSPADM

## 2019-09-13 RX ORDER — HYDROCODONE BITARTRATE AND ACETAMINOPHEN 5; 325 MG/1; MG/1
1 TABLET ORAL EVERY 4 HOURS PRN
Status: CANCELLED | OUTPATIENT
Start: 2019-09-13

## 2019-09-13 RX ORDER — ONDANSETRON 8 MG/1
8 TABLET, ORALLY DISINTEGRATING ORAL EVERY 8 HOURS PRN
Status: DISCONTINUED | OUTPATIENT
Start: 2019-09-13 | End: 2019-09-13 | Stop reason: HOSPADM

## 2019-09-13 RX ORDER — MEPERIDINE HYDROCHLORIDE 25 MG/ML
12.5 INJECTION INTRAMUSCULAR; INTRAVENOUS; SUBCUTANEOUS ONCE AS NEEDED
Status: DISCONTINUED | OUTPATIENT
Start: 2019-09-13 | End: 2019-09-13 | Stop reason: HOSPADM

## 2019-09-13 RX ORDER — MIDAZOLAM HYDROCHLORIDE 1 MG/ML
INJECTION INTRAMUSCULAR; INTRAVENOUS
Status: DISCONTINUED | OUTPATIENT
Start: 2019-09-13 | End: 2019-09-13

## 2019-09-13 RX ORDER — OXYCODONE HYDROCHLORIDE 5 MG/1
5 TABLET ORAL
Status: DISCONTINUED | OUTPATIENT
Start: 2019-09-13 | End: 2019-09-13 | Stop reason: HOSPADM

## 2019-09-13 RX ORDER — LIDOCAINE HCL/PF 100 MG/5ML
SYRINGE (ML) INTRAVENOUS
Status: DISCONTINUED | OUTPATIENT
Start: 2019-09-13 | End: 2019-09-13

## 2019-09-13 RX ORDER — GLYCOPYRROLATE 0.2 MG/ML
INJECTION INTRAMUSCULAR; INTRAVENOUS
Status: DISCONTINUED | OUTPATIENT
Start: 2019-09-13 | End: 2019-09-13

## 2019-09-13 RX ORDER — PROPOFOL 10 MG/ML
VIAL (ML) INTRAVENOUS
Status: DISCONTINUED | OUTPATIENT
Start: 2019-09-13 | End: 2019-09-13

## 2019-09-13 RX ORDER — SODIUM CHLORIDE, SODIUM LACTATE, POTASSIUM CHLORIDE, CALCIUM CHLORIDE 600; 310; 30; 20 MG/100ML; MG/100ML; MG/100ML; MG/100ML
INJECTION, SOLUTION INTRAVENOUS CONTINUOUS
Status: DISCONTINUED | OUTPATIENT
Start: 2019-09-13 | End: 2019-09-13 | Stop reason: HOSPADM

## 2019-09-13 RX ORDER — SODIUM CHLORIDE AND POTASSIUM CHLORIDE 150; 900 MG/100ML; MG/100ML
INJECTION, SOLUTION INTRAVENOUS CONTINUOUS
Status: CANCELLED | OUTPATIENT
Start: 2019-09-13

## 2019-09-13 RX ORDER — IBUPROFEN 600 MG/1
600 TABLET ORAL EVERY 6 HOURS PRN
Status: CANCELLED | OUTPATIENT
Start: 2019-09-13

## 2019-09-13 RX ORDER — DIPHENHYDRAMINE HCL 25 MG
25 CAPSULE ORAL EVERY 4 HOURS PRN
Status: CANCELLED | OUTPATIENT
Start: 2019-09-13

## 2019-09-13 RX ORDER — LIDOCAINE HYDROCHLORIDE 10 MG/ML
0.5 INJECTION, SOLUTION EPIDURAL; INFILTRATION; INTRACAUDAL; PERINEURAL ONCE
Status: DISCONTINUED | OUTPATIENT
Start: 2019-09-13 | End: 2019-09-13 | Stop reason: HOSPADM

## 2019-09-13 RX ORDER — DEXAMETHASONE SODIUM PHOSPHATE 4 MG/ML
INJECTION, SOLUTION INTRA-ARTICULAR; INTRALESIONAL; INTRAMUSCULAR; INTRAVENOUS; SOFT TISSUE
Status: DISCONTINUED | OUTPATIENT
Start: 2019-09-13 | End: 2019-09-13

## 2019-09-13 RX ORDER — PREGABALIN 75 MG/1
75 CAPSULE ORAL ONCE
Status: COMPLETED | OUTPATIENT
Start: 2019-09-13 | End: 2019-09-13

## 2019-09-13 RX ADMIN — EPHEDRINE SULFATE 5 MG: 50 INJECTION INTRAMUSCULAR; INTRAVENOUS; SUBCUTANEOUS at 01:09

## 2019-09-13 RX ADMIN — MIDAZOLAM HYDROCHLORIDE 2 MG: 1 INJECTION, SOLUTION INTRAMUSCULAR; INTRAVENOUS at 01:09

## 2019-09-13 RX ADMIN — FENTANYL CITRATE 50 MCG: 50 INJECTION, SOLUTION INTRAMUSCULAR; INTRAVENOUS at 01:09

## 2019-09-13 RX ADMIN — SODIUM CHLORIDE, SODIUM LACTATE, POTASSIUM CHLORIDE, AND CALCIUM CHLORIDE: 600; 310; 30; 20 INJECTION, SOLUTION INTRAVENOUS at 02:09

## 2019-09-13 RX ADMIN — GLYCOPYRROLATE 0.2 MG: 0.2 INJECTION, SOLUTION INTRAMUSCULAR; INTRAVENOUS at 01:09

## 2019-09-13 RX ADMIN — ONDANSETRON 8 MG: 8 TABLET, ORALLY DISINTEGRATING ORAL at 06:09

## 2019-09-13 RX ADMIN — HYDROMORPHONE HYDROCHLORIDE 0.4 MG: 2 INJECTION INTRAMUSCULAR; INTRAVENOUS; SUBCUTANEOUS at 03:09

## 2019-09-13 RX ADMIN — FENTANYL CITRATE 100 MCG: 50 INJECTION, SOLUTION INTRAMUSCULAR; INTRAVENOUS at 01:09

## 2019-09-13 RX ADMIN — SODIUM CHLORIDE, SODIUM LACTATE, POTASSIUM CHLORIDE, AND CALCIUM CHLORIDE: 600; 310; 30; 20 INJECTION, SOLUTION INTRAVENOUS at 12:09

## 2019-09-13 RX ADMIN — PROPOFOL 200 MG: 10 INJECTION, EMULSION INTRAVENOUS at 01:09

## 2019-09-13 RX ADMIN — ONDANSETRON 4 MG: 2 INJECTION INTRAMUSCULAR; INTRAVENOUS at 01:09

## 2019-09-13 RX ADMIN — HYDROMORPHONE HYDROCHLORIDE 0.4 MG: 2 INJECTION INTRAMUSCULAR; INTRAVENOUS; SUBCUTANEOUS at 02:09

## 2019-09-13 RX ADMIN — PREGABALIN 75 MG: 75 CAPSULE ORAL at 09:09

## 2019-09-13 RX ADMIN — DEXAMETHASONE SODIUM PHOSPHATE 8 MG: 4 INJECTION, SOLUTION INTRAMUSCULAR; INTRAVENOUS at 01:09

## 2019-09-13 RX ADMIN — LIDOCAINE HYDROCHLORIDE 100 MG: 20 INJECTION, SOLUTION INTRAVENOUS at 01:09

## 2019-09-13 NOTE — TRANSFER OF CARE
"Anesthesia Transfer of Care Note    Patient: Denisha Becker    Procedure(s) Performed: Procedure(s) (LRB):  HYSTEROSCOPY, WITH DILATION AND CURETTAGE OF UTERUS (N/A)    Patient location: PACU    Anesthesia Type: general    Transport from OR: Transported from OR on 2-3 L/min O2 by NC with adequate spontaneous ventilation    Post pain: adequate analgesia    Post assessment: no apparent anesthetic complications    Post vital signs: stable    Level of consciousness: awake and alert    Nausea/Vomiting: no nausea/vomiting    Complications: none    Transfer of care protocol was followed      Last vitals:   Visit Vitals  BP (!) 169/79   Pulse 60   Temp 36.7 °C (98 °F)   Resp 18   Ht 5' 6" (1.676 m)   Wt 125.6 kg (277 lb)   LMP 09/02/2019   SpO2 95%   Breastfeeding? No   BMI 44.71 kg/m²     "

## 2019-09-13 NOTE — PLAN OF CARE
Daughter had to leave to catch a plane and patient gone to holding.  Daughter left patient's purse at [facility] nursing station, refused security.  Patient's significant other Honorio will be coming to bring patient home.

## 2019-09-13 NOTE — PROGRESS NOTES
Patient 2 hours post op hysteroscopy with 1900cc deficit. Patient groggy but stable. No complaints. A/O x3. Vitals stable. If sodium and other labs wnl can be discharged.    Ace Vargas MD  OB/GYN PGY-3  Pager: [phone]

## 2019-09-13 NOTE — BRIEF OP NOTE
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     Estimated Blood Loss: Less than 10cc    Fluid deficit: 1930cc          Specimens:   Specimen (12h ago, onward)    Start     Ordered    09/13/19 1416  Specimen to Pathology - Surgery  Once     Comments:  1. ENDOCERVICAL CURETTINGS2. ENDOMETRIAL MASS     Start Status     09/13/19 1416 Collected (09/13/19 1419) Order ID: [id number]       09/13/19 1418          Discharge Note    SUMMARY     Admit Date: 9/13/2019    Discharge Date and Time:  09/13/2019 3:01 PM    Hospital Course (synopsis of major diagnoses, care, treatment, and services provided during the course of the hospital stay): Patient admitted for D&C/Hysterosocpy, underwent procedure without difficulty.      Final Diagnosis: Post-Op Diagnosis Codes:     * PMB (postmenopausal bleeding) [N95.0]    Disposition: Home or Self Care    Follow Up/Patient Instructions:     Medications:  Reconciled Home Medications:      Medication List      ASK your doctor about these medications    cetirizine 10 MG tablet  Commonly known as:  ZYRTEC  TK 1 T PO QD FOR ALLERGIES     clotrimazole 1 % cream  Commonly known as:  LOTRIMIN  GERMAN EXT AA BID FOR 7 DAYS     fluconazole 150 MG Tab  Commonly known as:  DIFLUCAN  TK 1 T PO QD FOR 1 DAY     tinidazole 500 MG tablet  Commonly known as:  TINDAMAX  Take 4 tablets today & 4 tablets tomorrow. for 1 dose          No discharge procedures on file.

## 2019-09-13 NOTE — DISCHARGE INSTRUCTIONS
Home Care Instruction D&C Hysteroscopy             ACTIVITY LEVEL:  If you received sedation and/or an anesthetic, you may feel sleepy for several hours. Rest until you feel more  awake. Gradually resume your normal activities.    DIET:  At home, continue with liquids. If there is no nausea, you may eat a soft diet and gradually resume a regular diet.    BATHING:  You may shower  as desired in one day.  You should avoid tub baths, hot tubs and swimming pools until seen by your physician for a post-op follow up.    CARE:  You can expect watery or bloody vaginal discharge for several days. Do not use tampons, please only use pads. Sexual activity is restricted as advised by your doctor.    MEDICATIONS:  You will receive instructions for any pain and/or antibiotic prescriptions. Pain medication should be taken only if needed and as directed. Antibiotics, if ordered, should be taken as directed until the entire prescription is completed.    ADDITIONAL INFORMATION:  __________________________________________________________________________________________  WHEN TO CALL THE DOCTOR:   For any heavy vaginal bleeding   Fever over 101°F (38.4°C)   Any lower abdominal pain not relieved by the pain mediation  RETURN APPOINTMENT:  __________________________________________________________________________________________  FOR EMERGENCIES:  If any unusual problems or difficulties occur, contact Dr. Jensen_______________ or the resident at [phone] (page ) or the Clinic office, [phone].              Anesthesia: After Your Surgery  Youve just had surgery. During surgery, you received medication called anesthesia to keep you comfortable and pain-free. After surgery, you may experience some pain or nausea. This is common. Here are some tips for feeling better and recovering after surgery.    Going home  Your doctor or nurse will show you how to take care of yourself when you go home. He or she will also  answer your questions. Have an adult family member or friend drive you home. For the first 24 hours after your surgery:  · Do not drive or use heavy equipment.  · Do not make important decisions or sign legal documents.  · Avoid alcohol.  · Have someone stay with you, if needed. He or she can watch for problems and help keep you safe.  · Take your time getting up from a seated or lying position. You may experience dizziness for 24 hours  Be sure to keep all follow-up appointments with your doctor. And rest after your procedure for as long as your doctor tells you to.    Coping with pain  If you have pain after surgery, pain medication will help you feel better. Take it as directed, before pain becomes severe. Also, ask your doctor or pharmacist about other ways to control pain, such as with heat, ice, and relaxation. And follow any other instructions your surgeon or nurse gives you.    URINARY RETENTION  Should you experience a decrease in your urine output or are unable to urinate following surgery, this can be due to the medications given during surgery.  We recommend you going to the nearest Emergency Department.    Tips for taking pain medication  To get the best relief possible, remember these points:  · Pain medications can upset your stomach. Taking them with a little food may help.  · Most pain relievers taken by mouth need at least 20 to 30 minutes to take effect.  · Taking medication on a schedule can help you remember to take it. Try to time your medication so that you can take it before beginning an activity, such as dressing, walking, or sitting down for dinner.  · Constipation is a common side effect of pain medications. Contact your doctor before taking any medications like laxatives or stool softeners to help relieve constipation. Also ask about any dietary restrictions, because drinking lots of fluids and eating foods like fruits and vegetables that are high in fiber can also help. Remember, dont  take laxatives unless your surgeon has prescribed them.  · Mixing alcohol and pain medication can cause dizziness and slow your breathing. It can even be fatal. Dont drink alcohol while taking pain medication.  · Pain medication can slow your reflexes. Dont drive or operate machinery while taking pain medication.  If your health care provider tells you to take acetaminophen to help relieve your pain, ask him or her how much you are supposed to take each day. (Acetaminophen is the generic name for Tylenol and other brand-name pain relievers.) Acetaminophen or other pain relievers may interact with your prescription medicines or other over-the-counter (OTC) drugs. Some prescription medications contain acetaminophen along with other active ingredients. Using both prescription and OTC acetaminophen for pain can cause you to overdose. The FDA recommends that you read the labels on your OTC medications carefully. This will help you to clearly understand the list of active ingredients, dosing instructions, and any warnings. It may also help you avoid taking too much acetaminophen. If you have questions or don't understand the information, ask your pharmacist or health care provider to explain it to you before you take the OTC medication.    Managing nausea  Some people have an upset stomach after surgery. This is often due to anesthesia, pain, pain medications, or the stress of surgery. The following tips will help you manage nausea and get good nutrition as you recover. If you were on a special diet before surgery, ask your doctor if you should follow it during recovery. These tips may help:  · Dont push yourself to eat. Your body will tell you when to eat and how much.  · Start off with clear liquids and soup. They are easier to digest.  · Progress to semi-solid foods (mashed potatoes, applesauce, and gelatin) as you feel ready.  · Slowly move to solid foods. Dont eat fatty, rich, or spicy foods at first.  · Dont  force yourself to have three large meals a day. Instead, eat smaller amounts more often.  · Take pain medications with a small amount of solid food, such as crackers or toast to avoid nausea.      Call your surgeon if    · You feel too sleepy, dizzy, or groggy (medication may be too strong).  · You have side effects like nausea, vomiting, or skin changes (rash, itching, or hives).   © 7741-8892 [employer]. [address] [place] [address], [place] [address]. All rights reserved. This information is not intended as a substitute for professional medical care. Always follow your healthcare professional's instructions.

## 2019-09-13 NOTE — ANESTHESIA POSTPROCEDURE EVALUATION
Anesthesia Post Evaluation    Patient: Denisha Becker    Procedure(s) Performed: Procedure(s) (LRB):  HYSTEROSCOPY, WITH DILATION AND CURETTAGE OF UTERUS (N/A)    Final Anesthesia Type: general  Patient location during evaluation: PACU  Patient participation: Yes- Able to Participate  Level of consciousness: awake and alert  Post-procedure vital signs: reviewed and stable  Pain management: adequate  Airway patency: patent  PONV status at discharge: No PONV  Anesthetic complications: no      Cardiovascular status: blood pressure returned to baseline  Respiratory status: unassisted  Hydration status: euvolemic  Follow-up not needed.          Vitals Value Taken Time   /70 9/13/2019  3:38 PM   Temp 36.4 °C (97.5 °F) 9/13/2019  3:20 PM   Pulse 66 9/13/2019  3:42 PM   Resp 16 9/13/2019  3:20 PM   SpO2 93 % 9/13/2019  3:42 PM   Vitals shown include unvalidated device data.      No case tracking events are documented in the log.      Pain/Abigail Score: Pain Rating Prior to Med Admin: 7 (9/13/2019  2:55 PM)  Abigail Score: 8 (9/13/2019  3:00 PM)

## 2019-09-13 NOTE — PLAN OF CARE
Holding notified that patient's daughter had to catch a flight and purse was left at ACU station.  Called Mr Olmedo's number and left voice message.

## 2019-09-14 NOTE — PLAN OF CARE
Denisha INOCENCIO Becker has met all discharge criteria from Phase II. Vital Signs are stable, ambulating  without difficulty. Discharge instructions given, patient verbalized understanding. Discharged from facility via wheelchair in stable condition.

## 2019-09-14 NOTE — PROGRESS NOTES
Dr. Silva paged and notified of vital signs, patient had voided adequate amount and lab results WNL. Also notified that patient was seen by Dr. Vargas in PACU prior to coming ACU. Dr. Silva stated OK to D/C patient, she was also notified that patient needed a prescription escribed for pain. Orders noted.

## 2019-09-15 NOTE — OP NOTE
OPERATIVE REPORT    Surgery Date: 9/13/2019      Surgeon(s) and Role:     * Komal Solis MD - Primary     Assisting Surgeon: None     Pre-op Diagnosis:  PMB (postmenopausal bleeding) [N95.0]     Post-op Diagnosis:  Post-Op Diagnosis Codes:     * PMB (postmenopausal bleeding) [N95.0]     Procedure(s) (LRB):  HYSTEROSCOPY, WITH DILATION AND CURETTAGE OF UTERUS (N/A)     Anesthesia: General     Description of the findings of the procedure:      Findings/Key Components: Large polypoid intracavitary mass, irregular in appearance.      Estimated Blood Loss: Less than 10cc     Fluid deficit: 1930cc     FINDINGS: Uterus sounded to 7 cm. Cervix did not pull.     PROCEDURE: The patient was taken to the operating room where general anesthesia was administered and found to be adequate.  She was prepped and draped in the dorsal lithotomy position.  A weighted sterile speculum was placed in the posterior aspect of vagina, and a right angle retractor was placed in the anterior aspect of the vagina.  The anterior lip of the cervix was grasped with a single tooth tenaculum.  The uterus sounded to approximately 7 cm.  The cervix was gently and gradually dilated using Alexis dilators in order to accommodate a 5mm hysteroscope.  The hysteroscope was then advanced through the cervical os and the uterus was distended with normal saline.  The endometrium was inspected and found to have of a large irregular shape to polyp appointments in the uterine cavity. Mass appeared with areas of solid white appearance and hemorrhagic areas. The tubal ostia were identified without difficulty, and appeared normal. The true clear hysteroscope was used and the  blade was used to remove to at least 50% of the mass.  During the procedure there was an increase in fluid deficit rapidly; however they remain good uterine distention.  Secondary to fluid deficit and one small area concerning for possible uterine perforation the procedure was  discontinued at this time.  There was minimum bleeding noted. The hysteroscope was withdrawn without difficulty.     The endometrial scrapings were sent to pathology. The tenaculum was removed and hemostasis was noted at the puncture sites in the cervix.     Sponge, lap, needle counts were correct x 2. The patient was taken to the recovery room in stable condition.

## 2019-09-20 ENCOUNTER — TELEPHONE (OUTPATIENT)
Dept: OBSTETRICS AND GYNECOLOGY | Facility: CLINIC | Age: 64
End: 2019-09-20

## 2019-09-20 ENCOUNTER — LAB VISIT (OUTPATIENT)
Dept: LAB | Facility: OTHER | Age: 64
End: 2019-09-20
Attending: OBSTETRICS & GYNECOLOGY
Payer: MEDICAID

## 2019-09-20 ENCOUNTER — OFFICE VISIT (OUTPATIENT)
Dept: OBSTETRICS AND GYNECOLOGY | Facility: CLINIC | Age: 64
End: 2019-09-20
Payer: MEDICAID

## 2019-09-20 VITALS
BODY MASS INDEX: 44.29 KG/M2 | DIASTOLIC BLOOD PRESSURE: 80 MMHG | HEIGHT: 66 IN | SYSTOLIC BLOOD PRESSURE: 120 MMHG | WEIGHT: 275.56 LBS

## 2019-09-20 DIAGNOSIS — C55 CARCINOSARCOMA OF UTERUS: Primary | ICD-10-CM

## 2019-09-20 DIAGNOSIS — G89.18 POST-OP PAIN: Primary | ICD-10-CM

## 2019-09-20 DIAGNOSIS — G89.18 POST-OP PAIN: ICD-10-CM

## 2019-09-20 DIAGNOSIS — N95.0 PMB (POSTMENOPAUSAL BLEEDING): ICD-10-CM

## 2019-09-20 LAB
BASOPHILS # BLD AUTO: 0.01 K/UL (ref 0–0.2)
BASOPHILS NFR BLD: 0.2 % (ref 0–1.9)
DIFFERENTIAL METHOD: ABNORMAL
EOSINOPHIL # BLD AUTO: 0.1 K/UL (ref 0–0.5)
EOSINOPHIL NFR BLD: 1.2 % (ref 0–8)
ERYTHROCYTE [DISTWIDTH] IN BLOOD BY AUTOMATED COUNT: 14.2 % (ref 11.5–14.5)
HCT VFR BLD AUTO: 43.5 % (ref 37–48.5)
HGB BLD-MCNC: 14.1 G/DL (ref 12–16)
IMM GRANULOCYTES # BLD AUTO: 0.01 K/UL (ref 0–0.04)
IMM GRANULOCYTES NFR BLD AUTO: 0.2 % (ref 0–0.5)
LYMPHOCYTES # BLD AUTO: 3 K/UL (ref 1–4.8)
LYMPHOCYTES NFR BLD: 50.3 % (ref 18–48)
MCH RBC QN AUTO: 31.1 PG (ref 27–31)
MCHC RBC AUTO-ENTMCNC: 32.4 G/DL (ref 32–36)
MCV RBC AUTO: 96 FL (ref 82–98)
MONOCYTES # BLD AUTO: 0.6 K/UL (ref 0.3–1)
MONOCYTES NFR BLD: 9.8 % (ref 4–15)
NEUTROPHILS # BLD AUTO: 2.3 K/UL (ref 1.8–7.7)
NEUTROPHILS NFR BLD: 38.3 % (ref 38–73)
NRBC BLD-RTO: 0 /100 WBC
PLATELET # BLD AUTO: 271 K/UL (ref 150–350)
PMV BLD AUTO: 10.1 FL (ref 9.2–12.9)
RBC # BLD AUTO: 4.54 M/UL (ref 4–5.4)
WBC # BLD AUTO: 5.9 K/UL (ref 3.9–12.7)

## 2019-09-20 PROCEDURE — 85025 COMPLETE CBC W/AUTO DIFF WBC: CPT

## 2019-09-20 PROCEDURE — 99212 OFFICE O/P EST SF 10 MIN: CPT | Mod: PBBFAC | Performed by: OBSTETRICS & GYNECOLOGY

## 2019-09-20 PROCEDURE — 99999 PR PBB SHADOW E&M-EST. PATIENT-LVL II: CPT | Mod: PBBFAC,,, | Performed by: OBSTETRICS & GYNECOLOGY

## 2019-09-20 PROCEDURE — 99212 OFFICE O/P EST SF 10 MIN: CPT | Mod: S$PBB,,, | Performed by: OBSTETRICS & GYNECOLOGY

## 2019-09-20 PROCEDURE — 99212 PR OFFICE/OUTPT VISIT, EST, LEVL II, 10-19 MIN: ICD-10-PCS | Mod: S$PBB,,, | Performed by: OBSTETRICS & GYNECOLOGY

## 2019-09-20 PROCEDURE — 99999 PR PBB SHADOW E&M-EST. PATIENT-LVL II: ICD-10-PCS | Mod: PBBFAC,,, | Performed by: OBSTETRICS & GYNECOLOGY

## 2019-09-20 PROCEDURE — 36415 COLL VENOUS BLD VENIPUNCTURE: CPT

## 2019-09-20 NOTE — TELEPHONE ENCOUNTER
----- Message from Rosa Isela Domingo sent at 9/20/2019  8:08 AM CDT -----  Contact: BLANE VALDIVIA [[id number]]  Name of Who is Calling: BLANE VALDIVIA [[id number]]      What is the request in detail: Pt is requesting to speak with clinical staff.pt is having having some post procedure side effect  Please contact to further discuss and advise.       Can the clinic reply by [facility]: yes        What Number to Call Back if not in [facility]: [phone]

## 2019-09-20 NOTE — TELEPHONE ENCOUNTER
Pt states that she feel weird and she felt stiffness around her neck and then later that evening going up her head around her ear. Patient states that this morning it was stiff going up the back of her head and she has swelling right above the collar bone. It 's  not hot to touch but painful when she touches. Patient states when  she holds her head back she feels pain along her shoulder and the  back of her shoulder and collar bone. She had surgery last week and is wondering if this had anything to do with that.

## 2019-09-20 NOTE — TELEPHONE ENCOUNTER
Spoke with patient in detail. Results of pathology discussed with her in detail. Questions answered.     Spoke with GYN Oncology, the office will call patient directly to schedule appointment.     ORDERING PHYSICIAN(S)  BRANDI BECERRA  CLINICAL DIAGNOSIS/INFORMATION  post menopausal bleeding  PreOperative Diagnosis  SPECIMEN  1) endocervical curettings  2) Endometrial mass  FINAL PATHOLOGIC DIAGNOSIS  1. SMALL FRAGMENT OF BENIGN SQUAMOUS EPITHELIUM  2. 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 She denies CP, SOB, N&V, leg pain or any other symptoms. Strict precautions given and instructions to use NSAIDs sparingly and heat. She voices understanding.

## 2019-09-23 ENCOUNTER — TELEPHONE (OUTPATIENT)
Dept: ADMINISTRATIVE | Facility: OTHER | Age: 64
End: 2019-09-23

## 2019-09-23 NOTE — PROGRESS NOTES
HISTORY OF PRESENT ILLNESS:    Denisha Becker is a 64 y.o. female  Patient's last menstrual period was 2019. presents today complaining of acute onset of shoulder pain on yesterday.   Patient denies trauma, no N&V, no F&V, no CP or SOB.     Patient is s/p D&C/Hysterosopy for PMB.     Pathology:  SPECIMEN  1) endocervical curettings  2) Endometrial mass  FINAL PATHOLOGIC DIAGNOSIS  1. SMALL FRAGMENT OF BENIGN SQUAMOUS EPITHELIUM  2. 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      Past Medical History:   Diagnosis Date    Arthritis     right knee    Diverticulitis        Past Surgical History:   Procedure Laterality Date     SECTION, CLASSIC      HYSTEROSCOPY, WITH DILATION AND CURETTAGE OF UTERUS N/A 2019    [facility]    WISDOM TOOTH EXTRACTION         MEDICATIONS AND ALLERGIES:      Current Outpatient Medications:     cetirizine (ZYRTEC) 10 MG tablet, TK 1 T PO QD FOR ALLERGIES, Disp: , Rfl: 1    clotrimazole (LOTRIMIN) 1 % cream, GERMAN EXT AA BID FOR 7 DAYS, Disp: , Rfl: 0    fluconazole (DIFLUCAN) 150 MG Tab, TK 1 T PO QD FOR 1 DAY, Disp: , Rfl: 1    ibuprofen (ADVIL,MOTRIN) 600 MG tablet, Take 1 tablet (600 mg total) by mouth 3 (three) times daily., Disp: 30 tablet, Rfl: 0    Review of patient's allergies indicates:  No Known Allergies    COMPREHENSIVE GYN HISTORY:  PAP History: Denies abnormal Paps.  Infection History: Denies STDs. Denies PID.  Benign History: Denies uterine fibroids. Denies ovarian cysts. Denies endometriosis. Denies other conditions.  Cancer History: Denies cervical cancer. Denies uterine cancer or hyperplasia. Denies ovarian cancer. Denies vulvar cancer or pre-cancer. Denies vaginal cancer or pre-cancer. Denies breast cancer. Denies colon cancer.  Sexual Activity History: Reports currently being sexually active  Menstrual History: Every 28 days, flows for 4 days. Light  flow.  Dysmenorrhea History: Denies dysmenorrhea.  Contraception History:      ROS:  GENERAL: No fever or chills.  BREASTS: No pain. No lumps. No discharge.  ABDOMEN: No pain. No nausea. No vomiting. No diarrhea. No constipation.  REPRODUCTIVE: No abnormal bleeding.   VULVA: No pain. No lesions. No itching.  VAGINA: No relaxation. No itching. No odor. No discharge. No lesions.  URINARY: No incontinence. No nocturia. No frequency. No dysuria.    PE:  APPEARANCE: Well nourished, well developed, in no acute distress.  AFFECT: WNL, alert and oriented x 3.  ABDOMEN: Soft. No tenderness or masses. No hepatosplenomegaly. No hernias.      1. Carcinosarcoma of uterus    2. PMB (postmenopausal bleeding)        PLAN:  Path report discussed with patient in details and call made to Dr. Fisher's office for an appointment.      FOLLOW-UP with me pending test results.

## 2019-09-23 NOTE — TELEPHONE ENCOUNTER
----- Message from Ngozi Fu RN sent at 9/23/2019  8:40 AM CDT -----  Can you schedule this patient for us please?    ----- Message -----  From: Scottie Dumont MD  Sent: 9/23/2019   7:55 AM CDT  To: Viridiana Fisher MD, Macveronica Pisano, #    Thank you Viridiana.  I can see her this Wednesday or Thursday.      ----- Message -----  From: Viridiana Fisher MD  Sent: 9/20/2019   4:37 PM CDT  To: Ada Hou, RN, Faiza Becker, RN, #    Ok to scheduled with whoever has first available. My vacation week is limiting my schedule some sorry.     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 I copied Tim.     ----- Message -----  From: Ada Hou RN  Sent: 9/20/2019  11:28 AM  To: Viridiana Fisher MD, Faiza Becker, RN    HI,  I spoke to Dr. Solis and she informed me you were aware of this pt and you actually were called in to the OR (Hysteroscopy with D/C) on 9/13. She states this pt needs to be seen next week, but your Monday (1/2 day clinic) is pretty big already (18). Do you want me to maybe refer this pt to Dr. Dumont or get her on with you the week of 9/30?    Ada

## 2019-09-24 ENCOUNTER — TELEPHONE (OUTPATIENT)
Dept: GYNECOLOGIC ONCOLOGY | Facility: CLINIC | Age: 64
End: 2019-09-24

## 2019-09-24 ENCOUNTER — TELEPHONE (OUTPATIENT)
Dept: OBSTETRICS AND GYNECOLOGY | Facility: CLINIC | Age: 64
End: 2019-09-24

## 2019-09-24 DIAGNOSIS — C54.1 MALIGNANT NEOPLASM OF ENDOMETRIUM: Primary | ICD-10-CM

## 2019-09-24 NOTE — TELEPHONE ENCOUNTER
I left a message for the pt to call the office back to discuss an appt with her pcp and her insurance.

## 2019-09-24 NOTE — TELEPHONE ENCOUNTER
----- Message from Viridiana Fisher MD sent at 9/23/2019  5:18 PM CDT -----  Why was Sindy unable to schedule this patient because of medicaid?     ----- Message -----  From: Scottie Dumont MD  Sent: 9/23/2019   7:55 AM CDT  To: Viridiana Fisher MD, Macveronica Pisano, #    Thank you Viridiana.  I can see her this Wednesday or Thursday.      ----- Message -----  From: Viridiana Fisher MD  Sent: 9/20/2019   4:37 PM CDT  To: Ada Hou, RN, Faiza Becker, RN, #    Ok to scheduled with whoever has first available. My vacation week is limiting my schedule some sorry.     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 I copmelissa Dumont.     ----- Message -----  From: Ada Hou RN  Sent: 9/20/2019  11:28 AM  To: Viridiana Fisher MD, Faiza Becker, RN    HI,  I spoke to Dr. Solis and she informed me you were aware of this pt and you actually were called in to the OR (Hysteroscopy with D/C) on 9/13. She states this pt needs to be seen next week, but your Monday (1/2 day clinic) is pretty big already (18). Do you want me to maybe refer this pt to Dr. Dumont or get her on with you the week of 9/30?    Ada

## 2019-09-25 ENCOUNTER — TELEPHONE (OUTPATIENT)
Dept: GYNECOLOGIC ONCOLOGY | Facility: CLINIC | Age: 64
End: 2019-09-25

## 2019-09-25 ENCOUNTER — HOSPITAL ENCOUNTER (OUTPATIENT)
Dept: RADIOLOGY | Facility: OTHER | Age: 64
Discharge: HOME | End: 2019-09-25
Attending: OBSTETRICS & GYNECOLOGY
Payer: MEDICAID

## 2019-09-25 ENCOUNTER — INITIAL CONSULT (OUTPATIENT)
Dept: GYNECOLOGIC ONCOLOGY | Facility: CLINIC | Age: 64
End: 2019-09-25
Payer: MEDICAID

## 2019-09-25 VITALS
SYSTOLIC BLOOD PRESSURE: 137 MMHG | BODY MASS INDEX: 44.93 KG/M2 | WEIGHT: 279.56 LBS | HEART RATE: 63 BPM | DIASTOLIC BLOOD PRESSURE: 63 MMHG | HEIGHT: 66 IN

## 2019-09-25 DIAGNOSIS — C54.1 MALIGNANT NEOPLASM OF ENDOMETRIUM: ICD-10-CM

## 2019-09-25 DIAGNOSIS — C54.1 MALIGNANT NEOPLASM OF ENDOMETRIUM: Primary | ICD-10-CM

## 2019-09-25 DIAGNOSIS — E66.01 MORBID OBESITY WITH BMI OF 40.0-44.9, ADULT: ICD-10-CM

## 2019-09-25 DIAGNOSIS — Z87.19 HISTORY OF DIVERTICULITIS: ICD-10-CM

## 2019-09-25 PROCEDURE — 99999 PR PBB SHADOW E&M-EST. PATIENT-LVL III: ICD-10-PCS | Mod: PBBFAC,,, | Performed by: OBSTETRICS & GYNECOLOGY

## 2019-09-25 PROCEDURE — 99999 PR PBB SHADOW E&M-EST. PATIENT-LVL III: CPT | Mod: PBBFAC,,, | Performed by: OBSTETRICS & GYNECOLOGY

## 2019-09-25 PROCEDURE — 74177 CT ABD & PELVIS W/CONTRAST: CPT | Mod: TC

## 2019-09-25 PROCEDURE — 99213 OFFICE O/P EST LOW 20 MIN: CPT | Mod: PBBFAC,25 | Performed by: OBSTETRICS & GYNECOLOGY

## 2019-09-25 PROCEDURE — 74177 CT ABD & PELVIS W/CONTRAST: CPT | Mod: 26,,, | Performed by: RADIOLOGY

## 2019-09-25 PROCEDURE — 25500020 PHARM REV CODE 255: Performed by: OBSTETRICS & GYNECOLOGY

## 2019-09-25 PROCEDURE — 99205 PR OFFICE/OUTPT VISIT, NEW, LEVL V, 60-74 MIN: ICD-10-PCS | Mod: S$PBB,,, | Performed by: OBSTETRICS & GYNECOLOGY

## 2019-09-25 PROCEDURE — 71260 CT THORAX DX C+: CPT | Mod: 26,,, | Performed by: RADIOLOGY

## 2019-09-25 PROCEDURE — 74177 CT CHEST ABDOMEN PELVIS WITH CONTRAST (XPD): ICD-10-PCS | Mod: 26,,, | Performed by: RADIOLOGY

## 2019-09-25 PROCEDURE — 99205 OFFICE O/P NEW HI 60 MIN: CPT | Mod: S$PBB,,, | Performed by: OBSTETRICS & GYNECOLOGY

## 2019-09-25 PROCEDURE — 71260 CT CHEST ABDOMEN PELVIS WITH CONTRAST (XPD): ICD-10-PCS | Mod: 26,,, | Performed by: RADIOLOGY

## 2019-09-25 RX ADMIN — IOHEXOL 1000 ML: 12 SOLUTION ORAL at 11:09

## 2019-09-25 RX ADMIN — IOHEXOL 100 ML: 350 INJECTION, SOLUTION INTRAVENOUS at 12:09

## 2019-09-25 NOTE — PROGRESS NOTES
REFERRING PROVIDER  Komal Solis MD     HISTORY OF PRESENT CONDITION  CC: Endometrial Cancer  Denisha Becker is a 64 y.o.  who presents in consultation at for an opinion regarding carcinosarcoma of the endometrial.  +VB with 1 pad/day. -VD, changes in urine or stool, abdominal pain, bloating, or unintentional weight loss.  Tolerating PO. -N/V. Denies h/o abnormal Pap smears or mammograms. No colonoscopy.      REVIEW OF SYSTEMS  All systems reviewed and negative except as noted in HPI.    OBJECTIVE   Vitals:    19 0955   BP: 137/63   Pulse: 63      Body mass index is 45.12 kg/m².      1. General: Well appearing, no apparent distress, alert and oriented.  2. Lymph: Neck symmetric without cervical or supraclavicular adenopathy or mass.  3. Lungs: Normal respiratory rate, no accessory muscle use.  4. Cardiac: Normal rate  4. Psych: Normal affect.  5. Abdomen:  non-distended, soft, non-tender, are no masses, no ascites, no hepatosplenomegaly.  6. Skin: Warm, dry, no rashes or lesions.   7. Extremities: Bilateral lower extremities without edema or tenderness.  8. Genitourinary: Deferred    ECOG status: 1    LABORATORY DATA  Lab data reviewed.    RADIOLOGICAL DATA  Radiology data reviewed.    PATHOLOGY DATA  Pathology data reviewed.    ASSESSMENT / PLAN     1. Malignant neoplasm of endometrium    2. Morbid obesity with BMI of 40.0-44.9, adult    3. History of diverticulitis       -CT C/A/P  -    I discussed with the patient that the primary treatment for endometrial cancer is surgery. This includes a total hysterectomy, bilateral salpingo-oophorectomy, and sentinel lymph node biopsies. I will plan to perform this in a laparoscopic fashion using the robot, unless there is evidence of metastatic disease. The procedure and its risks and benefits were discussed in detail.      PATIENT EDUCATION  Ready to learn, no apparent learning barriers were identified; learning preferences include listening.   Explained diagnosis and treatment plan; patient expressed understanding of the content.    INFORMED CONSENT  Discussed the risks, benefits, and alternatives of the procedure and of possible blood transfusion.  Discussed the necessity of other members of the healthcare team participating in the procedure.  All questions answered and consent given.    Scottie Dumont

## 2019-09-25 NOTE — LETTER
September 25, 2019      Komal Solis MD  [address]  [address]  [facility] [address]           [facility] [address]  [address], [address]  [place] [address]  Phone: [phone]  Fax: [phone]          Patient: Denisha Becker   MR Number: [id number]   YOB: 1955   Date of Visit: 9/25/2019       Dear Dr. Komal Solis:    Thank you for referring Denisha Becker to me for evaluation. Attached you will find relevant portions of my assessment and plan of care.    If you have questions, please do not hesitate to call me. I look forward to following Denisha Becker along with you.    Sincerely,    Scottie Dumont MD    Enclosure  CC:  No Recipients    If you would like to receive this communication electronically, please contact [email or web address] or [phone] to request more information on [facility] access.    For providers and/or their staff who would like to refer a patient to [facility], please contact us through our one-stop-shop provider referral line, [facility], at [phone].    If you feel you have received this communication in error or would no longer like to receive these types of communications, please e-mail [email or web address]

## 2019-09-26 ENCOUNTER — PATIENT MESSAGE (OUTPATIENT)
Dept: GYNECOLOGIC ONCOLOGY | Facility: CLINIC | Age: 64
End: 2019-09-26

## 2019-09-26 ENCOUNTER — TELEPHONE (OUTPATIENT)
Dept: GYNECOLOGIC ONCOLOGY | Facility: CLINIC | Age: 64
End: 2019-09-26

## 2019-09-26 DIAGNOSIS — C54.1 MALIGNANT NEOPLASM OF ENDOMETRIUM: Primary | ICD-10-CM

## 2019-09-26 RX ORDER — CELECOXIB 100 MG/1
100 CAPSULE ORAL ONCE
Status: CANCELLED | OUTPATIENT
Start: 2019-09-26 | End: 2019-09-26

## 2019-09-26 RX ORDER — GABAPENTIN 100 MG/1
300 CAPSULE ORAL ONCE
Status: CANCELLED | OUTPATIENT
Start: 2019-09-26 | End: 2019-09-26

## 2019-09-26 RX ORDER — ACETAMINOPHEN 325 MG/1
1000 TABLET ORAL ONCE
Status: CANCELLED | OUTPATIENT
Start: 2019-09-26 | End: 2019-09-26

## 2019-09-26 RX ORDER — LIDOCAINE HYDROCHLORIDE 10 MG/ML
1 INJECTION, SOLUTION EPIDURAL; INFILTRATION; INTRACAUDAL; PERINEURAL ONCE
Status: CANCELLED | OUTPATIENT
Start: 2019-09-26 | End: 2019-09-26

## 2019-09-30 ENCOUNTER — ANESTHESIA EVENT (OUTPATIENT)
Dept: SURGERY | Facility: OTHER | Age: 64
DRG: 734 | End: 2019-09-30
Payer: MEDICAID

## 2019-09-30 ENCOUNTER — TELEPHONE (OUTPATIENT)
Dept: GYNECOLOGIC ONCOLOGY | Facility: CLINIC | Age: 64
End: 2019-09-30

## 2019-09-30 ENCOUNTER — PATIENT MESSAGE (OUTPATIENT)
Dept: SURGERY | Facility: OTHER | Age: 64
End: 2019-09-30

## 2019-09-30 ENCOUNTER — HOSPITAL ENCOUNTER (OUTPATIENT)
Dept: PREADMISSION TESTING | Facility: OTHER | Age: 64
Discharge: HOME | DRG: 734 | End: 2019-09-30
Attending: OBSTETRICS & GYNECOLOGY
Payer: MEDICAID

## 2019-09-30 VITALS
HEART RATE: 63 BPM | SYSTOLIC BLOOD PRESSURE: 145 MMHG | BODY MASS INDEX: 44.52 KG/M2 | TEMPERATURE: 98 F | WEIGHT: 277 LBS | DIASTOLIC BLOOD PRESSURE: 75 MMHG | HEIGHT: 66 IN | OXYGEN SATURATION: 96 %

## 2019-09-30 DIAGNOSIS — Z01.818 PRE-OP TESTING: Primary | ICD-10-CM

## 2019-09-30 LAB
ABO + RH BLD: NORMAL
BLD GP AB SCN CELLS X3 SERPL QL: NORMAL

## 2019-09-30 PROCEDURE — 86901 BLOOD TYPING SEROLOGIC RH(D): CPT

## 2019-09-30 PROCEDURE — 36415 COLL VENOUS BLD VENIPUNCTURE: CPT

## 2019-09-30 RX ORDER — ACETAMINOPHEN 500 MG
1000 TABLET ORAL
Status: CANCELLED | OUTPATIENT
Start: 2019-09-30 | End: 2019-09-30

## 2019-09-30 RX ORDER — FAMOTIDINE 20 MG/1
20 TABLET, FILM COATED ORAL
Status: CANCELLED | OUTPATIENT
Start: 2019-09-30 | End: 2019-09-30

## 2019-09-30 RX ORDER — SODIUM CHLORIDE, SODIUM LACTATE, POTASSIUM CHLORIDE, CALCIUM CHLORIDE 600; 310; 30; 20 MG/100ML; MG/100ML; MG/100ML; MG/100ML
INJECTION, SOLUTION INTRAVENOUS CONTINUOUS
Status: CANCELLED | OUTPATIENT
Start: 2019-09-30

## 2019-09-30 RX ORDER — MIDAZOLAM HYDROCHLORIDE 1 MG/ML
2 INJECTION INTRAMUSCULAR; INTRAVENOUS ONCE AS NEEDED
Status: CANCELLED | OUTPATIENT
Start: 2019-09-30 | End: 2031-02-26

## 2019-09-30 RX ORDER — LIDOCAINE HYDROCHLORIDE 10 MG/ML
0.5 INJECTION, SOLUTION EPIDURAL; INFILTRATION; INTRACAUDAL; PERINEURAL ONCE
Status: CANCELLED | OUTPATIENT
Start: 2019-09-30 | End: 2019-09-30

## 2019-09-30 NOTE — ANESTHESIA PREPROCEDURE EVALUATION
09/30/2019  Denisha Becker is a 64 y.o., female.    Anesthesia Evaluation    I have reviewed the Patient Summary Reports.    I have reviewed the Nursing Notes.   I have reviewed the Medications.     Review of Systems  Anesthesia Hx:  No problems with previous Anesthesia  Denies Family Hx of Anesthesia complications.   Denies Personal Hx of Anesthesia complications.   Social:  Smoker    Hematology/Oncology:  Hematology Normal   Oncology Normal     EENT/Dental:EENT/Dental Normal   Cardiovascular:  Cardiovascular Normal     Pulmonary:  Pulmonary Normal    Renal/:  Renal/ Normal     Hepatic/GI:  Hepatic/GI Normal    Musculoskeletal:  Musculoskeletal Normal    Neurological:  Neurology Normal    Endocrine:  Endocrine Normal    Dermatological:  Skin Normal    Psych:  Psychiatric Normal           Physical Exam  General:  Morbid Obesity    Airway/Jaw/Neck:  Airway Findings: Mouth Opening: Normal Mallampati: III      Dental:  Dental Findings: In tact        Mental Status:  Mental Status Findings:  Cooperative, Alert and Oriented         Anesthesia Plan  Type of Anesthesia, risks & benefits discussed:  Anesthesia Type:  general  Patient's Preference:   Intra-op Monitoring Plan: standard ASA monitors  Intra-op Monitoring Plan Comments:   Post Op Pain Control Plan: multimodal analgesia and per primary service following discharge from PACU  Post Op Pain Control Plan Comments:   Induction:   IV  Beta Blocker:         Informed Consent: Patient understands risks and agrees with Anesthesia plan.  Questions answered. Anesthesia consent signed with patient.  ASA Score: 3     Day of Surgery Review of History & Physical:    H&P update referred to the surgeon.     Anesthesia Plan Notes: T&S ordered.  Pt here 2 weeks ago for D&C, no issues        Ready For Surgery From Anesthesia Perspective.

## 2019-09-30 NOTE — DISCHARGE INSTRUCTIONS
PRE-ADMIT TESTING -  [phone]    [address]  [place] [facility]          Your surgery has been scheduled at [facility]. We are pleased to have the opportunity to serve you. For Further Information please call [phone].    On the day of surgery please report to the Information Desk on the 1st floor.    · CONTACT YOUR PHYSICIAN'S OFFICE THE DAY PRIOR TO YOUR SURGERY TO OBTAIN YOUR ARRIVAL TIME.     · The evening before surgery do not eat anything after 9 p.m. ( this includes hard candy, chewing gum and mints).  You may only have GATORADE, POWERADE AND WATER  from 9 p.m. until you leave your home.   DO NOT DRINK ANY LIQUIDS ON THE WAY TO THE HOSPITAL.      SPECIAL MEDICATION INSTRUCTIONS: TAKE medications checked off by the Anesthesiologist on your Medication List.    Angiogram Patients: Take medications as instructed by your physician, including aspirin.     Surgery Patients:    If you take ASPIRIN - Your PHYSICIAN/SURGEON will need to inform you IF/OR when you need to stop taking aspirin prior to your surgery.     Do Not take any medications containing IBUPROFEN.  Do Not Wear any make-up or dark nail polish   (especially eye make-up) to surgery. If you come to surgery with makeup on you will be required to remove the makeup or nail polish.    Do not shave your surgical area at least 5 days prior to your surgery. The surgical prep will be performed at the hospital according to Infection Control regulations.    Leave all valuables at home.   Do Not wear any jewelry or watches, including any metal in body piercings. Jewelry must be removed prior to coming to the hospital.  There is a possibility that rings that are unable to be removed may be cut off if they are on the surgical extremity.    Contact Lens must be removed before surgery. Either do not wear the contact lens or bring a case and solution for storage.  Please bring a container for eyeglasses or dentures as required.  Bring  any paperwork your physician has provided, such as consent forms,  history and physicals, doctor's orders, etc.   Bring comfortable clothes that are loose fitting to wear upon discharge. Take into consideration the type of surgery being performed.  Maintain your diet as advised per your physician the day prior to surgery.      Adequate rest the night before surgery is advised.   Park in the Parking lot behind the hospital or in the [place] [place] across the street from the parking lot. Parking is complimentary.  If you will be discharged the same day as your procedure, please arrange for a responsible adult to drive you home or to accompany you if traveling by taxi.   YOU WILL NOT BE PERMITTED TO DRIVE OR TO LEAVE THE HOSPITAL ALONE AFTER SURGERY.   It is strongly recommended that you arrange for someone to remain with you for the first 24 hrs following your surgery.    The Surgeon will speak to your family/visitor after your surgery regarding the outcome of your surgery and post op care.  The Surgeon may speak to you after your surgery, but there is a possibility you may not remember the details.  Please check with your family members regarding the conversation with the Surgeon.    We strongly recommend whoever is bringing you home be present for discharge instructions.  This will ensure a thorough understanding for your post op home care.      Thank you for your cooperation.  The Staff of [facility].                Bathing Instructions with Hibiclens     Shower the evening before and morning of your procedure with Hibiclens:   Wash your face with water and your regular face wash/soap   Apply Hibiclens directly on your skin or on a wet washcloth and wash gently. When showering: Move away from the shower stream when applying Hibiclens to avoid rinsing off too soon.   Rinse thoroughly with warm water   Do not dilute Hibiclens         Dry off as usual, do not use any deodorant,  powder, body lotions, perfume, after shave or cologne.

## 2019-10-01 ENCOUNTER — HOSPITAL ENCOUNTER (INPATIENT)
Facility: OTHER | Age: 64
LOS: 2 days | Discharge: HOME | DRG: 734 | End: 2019-10-03
Attending: OBSTETRICS & GYNECOLOGY | Admitting: OBSTETRICS & GYNECOLOGY
Payer: MEDICAID

## 2019-10-01 ENCOUNTER — ANESTHESIA (OUTPATIENT)
Dept: SURGERY | Facility: OTHER | Age: 64
DRG: 734 | End: 2019-10-01
Payer: MEDICAID

## 2019-10-01 DIAGNOSIS — C54.1 MALIGNANT NEOPLASM OF ENDOMETRIUM: Primary | ICD-10-CM

## 2019-10-01 DIAGNOSIS — Z90.710 HISTORY OF ROBOT-ASSISTED LAPAROSCOPIC HYSTERECTOMY: ICD-10-CM

## 2019-10-01 LAB — POCT GLUCOSE: 109 MG/DL (ref 70–110)

## 2019-10-01 PROCEDURE — 88342 IMHCHEM/IMCYTCHM 1ST ANTB: CPT | Mod: 26,,, | Performed by: PATHOLOGY

## 2019-10-01 PROCEDURE — 63600175 PHARM REV CODE 636 W HCPCS: Performed by: NURSE ANESTHETIST, CERTIFIED REGISTERED

## 2019-10-01 PROCEDURE — 38900 PR INTRAOPERATIVE SENTINEL LYMPH NODE ID W DYE INJECTION: ICD-10-PCS | Mod: ,,, | Performed by: OBSTETRICS & GYNECOLOGY

## 2019-10-01 PROCEDURE — P9045 ALBUMIN (HUMAN), 5%, 250 ML: HCPCS | Mod: JG | Performed by: NURSE ANESTHETIST, CERTIFIED REGISTERED

## 2019-10-01 PROCEDURE — 99499 NO LOS: ICD-10-PCS | Mod: ,,, | Performed by: OBSTETRICS & GYNECOLOGY

## 2019-10-01 PROCEDURE — 25000003 PHARM REV CODE 250: Performed by: STUDENT IN AN ORGANIZED HEALTH CARE EDUCATION/TRAINING PROGRAM

## 2019-10-01 PROCEDURE — 37000009 HC ANESTHESIA EA ADD 15 MINS: Performed by: OBSTETRICS & GYNECOLOGY

## 2019-10-01 PROCEDURE — 27000221 HC OXYGEN, UP TO 24 HOURS

## 2019-10-01 PROCEDURE — 88307 TISSUE EXAM BY PATHOLOGIST: CPT | Performed by: PATHOLOGY

## 2019-10-01 PROCEDURE — 88305 TISSUE EXAM BY PATHOLOGIST: CPT | Mod: 26,,, | Performed by: PATHOLOGY

## 2019-10-01 PROCEDURE — 25000003 PHARM REV CODE 250: Performed by: OBSTETRICS & GYNECOLOGY

## 2019-10-01 PROCEDURE — 63600175 PHARM REV CODE 636 W HCPCS: Performed by: STUDENT IN AN ORGANIZED HEALTH CARE EDUCATION/TRAINING PROGRAM

## 2019-10-01 PROCEDURE — 38900 IO MAP OF SENT LYMPH NODE: CPT | Mod: ,,, | Performed by: OBSTETRICS & GYNECOLOGY

## 2019-10-01 PROCEDURE — 88342 IMHCHEM/IMCYTCHM 1ST ANTB: CPT | Performed by: PATHOLOGY

## 2019-10-01 PROCEDURE — 99499 UNLISTED E&M SERVICE: CPT | Mod: ,,, | Performed by: OBSTETRICS & GYNECOLOGY

## 2019-10-01 PROCEDURE — 71000039 HC RECOVERY, EACH ADD'L HOUR: Performed by: OBSTETRICS & GYNECOLOGY

## 2019-10-01 PROCEDURE — 58573 PR LAPAROSCOPY TOT HYSTERECTOMY UTERUS >250 GRAM W TUBE/OVARY: ICD-10-PCS | Mod: 22,,, | Performed by: OBSTETRICS & GYNECOLOGY

## 2019-10-01 PROCEDURE — 71000033 HC RECOVERY, INTIAL HOUR: Performed by: OBSTETRICS & GYNECOLOGY

## 2019-10-01 PROCEDURE — 88309 TISSUE SPECIMEN TO PATHOLOGY - SURGERY: ICD-10-PCS | Mod: 26,,, | Performed by: PATHOLOGY

## 2019-10-01 PROCEDURE — 88309 TISSUE EXAM BY PATHOLOGIST: CPT | Mod: 26,,, | Performed by: PATHOLOGY

## 2019-10-01 PROCEDURE — 25000003 PHARM REV CODE 250: Performed by: ANESTHESIOLOGY

## 2019-10-01 PROCEDURE — 58573 TLH W/T/O UTERUS OVER 250 G: CPT | Mod: 22,,, | Performed by: OBSTETRICS & GYNECOLOGY

## 2019-10-01 PROCEDURE — 63600175 PHARM REV CODE 636 W HCPCS: Performed by: ANESTHESIOLOGY

## 2019-10-01 PROCEDURE — 36000713 HC OR TIME LEV V EA ADD 15 MIN: Performed by: OBSTETRICS & GYNECOLOGY

## 2019-10-01 PROCEDURE — 88341 PR IHC OR ICC EACH ADD'L SINGLE ANTIBODY  STAINPR: ICD-10-PCS | Mod: 26,,, | Performed by: PATHOLOGY

## 2019-10-01 PROCEDURE — 88307 TISSUE SPECIMEN TO PATHOLOGY - SURGERY: ICD-10-PCS | Mod: 26,,, | Performed by: PATHOLOGY

## 2019-10-01 PROCEDURE — 88341 IMHCHEM/IMCYTCHM EA ADD ANTB: CPT | Mod: 26,,, | Performed by: PATHOLOGY

## 2019-10-01 PROCEDURE — 38571 PR LAP,PELVIC LYMPHADENECTOMY: ICD-10-PCS | Mod: 51,,, | Performed by: OBSTETRICS & GYNECOLOGY

## 2019-10-01 PROCEDURE — 36000712 HC OR TIME LEV V 1ST 15 MIN: Performed by: OBSTETRICS & GYNECOLOGY

## 2019-10-01 PROCEDURE — 11000001 HC ACUTE MED/SURG PRIVATE ROOM

## 2019-10-01 PROCEDURE — 38571 LAPAROSCOPY LYMPHADENECTOMY: CPT | Mod: 51,,, | Performed by: OBSTETRICS & GYNECOLOGY

## 2019-10-01 PROCEDURE — 88305 TISSUE EXAM BY PATHOLOGIST: CPT | Performed by: PATHOLOGY

## 2019-10-01 PROCEDURE — 88307 TISSUE EXAM BY PATHOLOGIST: CPT | Mod: 26,,, | Performed by: PATHOLOGY

## 2019-10-01 PROCEDURE — 37000008 HC ANESTHESIA 1ST 15 MINUTES: Performed by: OBSTETRICS & GYNECOLOGY

## 2019-10-01 PROCEDURE — 88342 TISSUE SPECIMEN TO PATHOLOGY - SURGERY: ICD-10-PCS | Mod: 26,,, | Performed by: PATHOLOGY

## 2019-10-01 PROCEDURE — 94761 N-INVAS EAR/PLS OXIMETRY MLT: CPT

## 2019-10-01 PROCEDURE — 27201423 OPTIME MED/SURG SUP & DEVICES STERILE SUPPLY: Performed by: OBSTETRICS & GYNECOLOGY

## 2019-10-01 PROCEDURE — 25000003 PHARM REV CODE 250: Performed by: NURSE ANESTHETIST, CERTIFIED REGISTERED

## 2019-10-01 PROCEDURE — 88305 TISSUE SPECIMEN TO PATHOLOGY - SURGERY: ICD-10-PCS | Mod: 26,,, | Performed by: PATHOLOGY

## 2019-10-01 PROCEDURE — 63600175 PHARM REV CODE 636 W HCPCS: Performed by: SPECIALIST

## 2019-10-01 PROCEDURE — 25000003 PHARM REV CODE 250: Performed by: SPECIALIST

## 2019-10-01 RX ORDER — NEOSTIGMINE METHYLSULFATE 1 MG/ML
INJECTION, SOLUTION INTRAVENOUS
Status: DISCONTINUED | OUTPATIENT
Start: 2019-10-01 | End: 2019-10-01

## 2019-10-01 RX ORDER — SODIUM CHLORIDE, SODIUM LACTATE, POTASSIUM CHLORIDE, CALCIUM CHLORIDE 600; 310; 30; 20 MG/100ML; MG/100ML; MG/100ML; MG/100ML
INJECTION, SOLUTION INTRAVENOUS CONTINUOUS
Status: DISCONTINUED | OUTPATIENT
Start: 2019-10-01 | End: 2019-10-01

## 2019-10-01 RX ORDER — GABAPENTIN 300 MG/1
300 CAPSULE ORAL ONCE
Status: COMPLETED | OUTPATIENT
Start: 2019-10-01 | End: 2019-10-01

## 2019-10-01 RX ORDER — ONDANSETRON 2 MG/ML
4 INJECTION INTRAMUSCULAR; INTRAVENOUS DAILY PRN
Status: DISCONTINUED | OUTPATIENT
Start: 2019-10-01 | End: 2019-10-01 | Stop reason: HOSPADM

## 2019-10-01 RX ORDER — OXYCODONE HYDROCHLORIDE 5 MG/1
5 TABLET ORAL
Status: DISCONTINUED | OUTPATIENT
Start: 2019-10-01 | End: 2019-10-01 | Stop reason: HOSPADM

## 2019-10-01 RX ORDER — INDOCYANINE GREEN AND WATER 25 MG
KIT INJECTION
Status: DISCONTINUED | OUTPATIENT
Start: 2019-10-01 | End: 2019-10-01 | Stop reason: HOSPADM

## 2019-10-01 RX ORDER — SENNOSIDES 8.6 MG/1
8.6 TABLET ORAL DAILY PRN
Status: DISCONTINUED | OUTPATIENT
Start: 2019-10-01 | End: 2019-10-03 | Stop reason: HOSPADM

## 2019-10-01 RX ORDER — HYDROMORPHONE HYDROCHLORIDE 2 MG/ML
0.4 INJECTION, SOLUTION INTRAMUSCULAR; INTRAVENOUS; SUBCUTANEOUS EVERY 5 MIN PRN
Status: DISCONTINUED | OUTPATIENT
Start: 2019-10-01 | End: 2019-10-01 | Stop reason: HOSPADM

## 2019-10-01 RX ORDER — LIDOCAINE HYDROCHLORIDE 10 MG/ML
1 INJECTION, SOLUTION EPIDURAL; INFILTRATION; INTRACAUDAL; PERINEURAL ONCE
Status: DISCONTINUED | OUTPATIENT
Start: 2019-10-01 | End: 2019-10-01 | Stop reason: HOSPADM

## 2019-10-01 RX ORDER — ROCURONIUM BROMIDE 10 MG/ML
INJECTION, SOLUTION INTRAVENOUS
Status: DISCONTINUED | OUTPATIENT
Start: 2019-10-01 | End: 2019-10-01

## 2019-10-01 RX ORDER — HYDROMORPHONE HYDROCHLORIDE 1 MG/ML
0.5 INJECTION, SOLUTION INTRAMUSCULAR; INTRAVENOUS; SUBCUTANEOUS
Status: DISCONTINUED | OUTPATIENT
Start: 2019-10-01 | End: 2019-10-03 | Stop reason: HOSPADM

## 2019-10-01 RX ORDER — PROPOFOL 10 MG/ML
VIAL (ML) INTRAVENOUS
Status: DISCONTINUED | OUTPATIENT
Start: 2019-10-01 | End: 2019-10-01

## 2019-10-01 RX ORDER — MIDAZOLAM HYDROCHLORIDE 1 MG/ML
2 INJECTION INTRAMUSCULAR; INTRAVENOUS ONCE AS NEEDED
Status: DISCONTINUED | OUTPATIENT
Start: 2019-10-01 | End: 2019-10-01 | Stop reason: HOSPADM

## 2019-10-01 RX ORDER — OXYCODONE HYDROCHLORIDE 5 MG/1
5 TABLET ORAL EVERY 4 HOURS PRN
Status: DISCONTINUED | OUTPATIENT
Start: 2019-10-01 | End: 2019-10-03 | Stop reason: HOSPADM

## 2019-10-01 RX ORDER — KETOROLAC TROMETHAMINE 30 MG/ML
15 INJECTION, SOLUTION INTRAMUSCULAR; INTRAVENOUS EVERY 6 HOURS
Status: COMPLETED | OUTPATIENT
Start: 2019-10-01 | End: 2019-10-03

## 2019-10-01 RX ORDER — ONDANSETRON 2 MG/ML
INJECTION INTRAMUSCULAR; INTRAVENOUS
Status: DISCONTINUED | OUTPATIENT
Start: 2019-10-01 | End: 2019-10-01

## 2019-10-01 RX ORDER — ACETAMINOPHEN 500 MG
1000 TABLET ORAL
Status: COMPLETED | OUTPATIENT
Start: 2019-10-01 | End: 2019-10-01

## 2019-10-01 RX ORDER — DEXAMETHASONE SODIUM PHOSPHATE 4 MG/ML
INJECTION, SOLUTION INTRA-ARTICULAR; INTRALESIONAL; INTRAMUSCULAR; INTRAVENOUS; SOFT TISSUE
Status: DISCONTINUED | OUTPATIENT
Start: 2019-10-01 | End: 2019-10-01

## 2019-10-01 RX ORDER — DIPHENHYDRAMINE HYDROCHLORIDE 50 MG/ML
25 INJECTION INTRAMUSCULAR; INTRAVENOUS EVERY 6 HOURS PRN
Status: DISCONTINUED | OUTPATIENT
Start: 2019-10-01 | End: 2019-10-01 | Stop reason: HOSPADM

## 2019-10-01 RX ORDER — ACETAMINOPHEN 500 MG
1000 TABLET ORAL
Status: DISCONTINUED | OUTPATIENT
Start: 2019-10-01 | End: 2019-10-03 | Stop reason: HOSPADM

## 2019-10-01 RX ORDER — SODIUM CHLORIDE, SODIUM LACTATE, POTASSIUM CHLORIDE, CALCIUM CHLORIDE 600; 310; 30; 20 MG/100ML; MG/100ML; MG/100ML; MG/100ML
INJECTION, SOLUTION INTRAVENOUS CONTINUOUS
Status: DISCONTINUED | OUTPATIENT
Start: 2019-10-01 | End: 2019-10-02

## 2019-10-01 RX ORDER — LIDOCAINE HCL/PF 100 MG/5ML
SYRINGE (ML) INTRAVENOUS
Status: DISCONTINUED | OUTPATIENT
Start: 2019-10-01 | End: 2019-10-01

## 2019-10-01 RX ORDER — ACETAMINOPHEN 500 MG
1000 TABLET ORAL ONCE
Status: DISCONTINUED | OUTPATIENT
Start: 2019-10-01 | End: 2019-10-01 | Stop reason: SDUPTHER

## 2019-10-01 RX ORDER — LIDOCAINE HYDROCHLORIDE 10 MG/ML
0.5 INJECTION, SOLUTION EPIDURAL; INFILTRATION; INTRACAUDAL; PERINEURAL ONCE
Status: DISCONTINUED | OUTPATIENT
Start: 2019-10-01 | End: 2019-10-01 | Stop reason: HOSPADM

## 2019-10-01 RX ORDER — SODIUM CHLORIDE 0.9 % (FLUSH) 0.9 %
3 SYRINGE (ML) INJECTION
Status: DISCONTINUED | OUTPATIENT
Start: 2019-10-01 | End: 2019-10-01

## 2019-10-01 RX ORDER — CELECOXIB 100 MG/1
100 CAPSULE ORAL ONCE
Status: COMPLETED | OUTPATIENT
Start: 2019-10-01 | End: 2019-10-01

## 2019-10-01 RX ORDER — FENTANYL CITRATE 50 UG/ML
INJECTION, SOLUTION INTRAMUSCULAR; INTRAVENOUS
Status: DISCONTINUED | OUTPATIENT
Start: 2019-10-01 | End: 2019-10-01

## 2019-10-01 RX ORDER — ALBUMIN HUMAN 50 G/1000ML
SOLUTION INTRAVENOUS CONTINUOUS PRN
Status: DISCONTINUED | OUTPATIENT
Start: 2019-10-01 | End: 2019-10-01

## 2019-10-01 RX ORDER — MEPERIDINE HYDROCHLORIDE 25 MG/ML
12.5 INJECTION INTRAMUSCULAR; INTRAVENOUS; SUBCUTANEOUS ONCE AS NEEDED
Status: DISCONTINUED | OUTPATIENT
Start: 2019-10-01 | End: 2019-10-01 | Stop reason: HOSPADM

## 2019-10-01 RX ORDER — CEFAZOLIN SODIUM 1 G/3ML
INJECTION, POWDER, FOR SOLUTION INTRAMUSCULAR; INTRAVENOUS
Status: DISCONTINUED | OUTPATIENT
Start: 2019-10-01 | End: 2019-10-01

## 2019-10-01 RX ORDER — FAMOTIDINE 20 MG/1
20 TABLET, FILM COATED ORAL
Status: COMPLETED | OUTPATIENT
Start: 2019-10-01 | End: 2019-10-01

## 2019-10-01 RX ORDER — SIMETHICONE 80 MG
1 TABLET,CHEWABLE ORAL 3 TIMES DAILY PRN
Status: DISCONTINUED | OUTPATIENT
Start: 2019-10-01 | End: 2019-10-03 | Stop reason: HOSPADM

## 2019-10-01 RX ORDER — GLYCOPYRROLATE 0.2 MG/ML
INJECTION INTRAMUSCULAR; INTRAVENOUS
Status: DISCONTINUED | OUTPATIENT
Start: 2019-10-01 | End: 2019-10-01

## 2019-10-01 RX ORDER — ONDANSETRON 2 MG/ML
8 INJECTION INTRAMUSCULAR; INTRAVENOUS EVERY 6 HOURS PRN
Status: DISCONTINUED | OUTPATIENT
Start: 2019-10-01 | End: 2019-10-03 | Stop reason: HOSPADM

## 2019-10-01 RX ADMIN — NEOSTIGMINE METHYLSULFATE 5 MG: 1 INJECTION INTRAVENOUS at 11:10

## 2019-10-01 RX ADMIN — PROPOFOL 200 MG: 10 INJECTION, EMULSION INTRAVENOUS at 07:10

## 2019-10-01 RX ADMIN — CEFAZOLIN 3 G: 330 INJECTION, POWDER, FOR SOLUTION INTRAMUSCULAR; INTRAVENOUS at 10:10

## 2019-10-01 RX ADMIN — Medication 10 MG: at 08:10

## 2019-10-01 RX ADMIN — HYDROMORPHONE HYDROCHLORIDE 0.5 MG: 1 INJECTION, SOLUTION INTRAMUSCULAR; INTRAVENOUS; SUBCUTANEOUS at 11:10

## 2019-10-01 RX ADMIN — HYDROMORPHONE HYDROCHLORIDE 0.4 MG: 2 INJECTION, SOLUTION INTRAMUSCULAR; INTRAVENOUS; SUBCUTANEOUS at 12:10

## 2019-10-01 RX ADMIN — ALBUMIN (HUMAN): 2.5 SOLUTION INTRAVENOUS at 08:10

## 2019-10-01 RX ADMIN — ROCURONIUM BROMIDE 30 MG: 10 INJECTION, SOLUTION INTRAVENOUS at 08:10

## 2019-10-01 RX ADMIN — SIMETHICONE CHEW TAB 80 MG 80 MG: 80 TABLET ORAL at 09:10

## 2019-10-01 RX ADMIN — CARBOXYMETHYLCELLULOSE SODIUM 2 DROP: 2.5 SOLUTION/ DROPS OPHTHALMIC at 07:10

## 2019-10-01 RX ADMIN — ROCURONIUM BROMIDE 10 MG: 10 INJECTION, SOLUTION INTRAVENOUS at 10:10

## 2019-10-01 RX ADMIN — GLYCOPYRROLATE 0.2 MG: 0.2 INJECTION, SOLUTION INTRAMUSCULAR; INTRAVENOUS at 07:10

## 2019-10-01 RX ADMIN — GLYCOPYRROLATE 0.8 MG: 0.2 INJECTION, SOLUTION INTRAMUSCULAR; INTRAVENOUS at 11:10

## 2019-10-01 RX ADMIN — ACETAMINOPHEN 1000 MG: 500 TABLET, FILM COATED ORAL at 05:10

## 2019-10-01 RX ADMIN — FENTANYL CITRATE 100 MCG: 50 INJECTION, SOLUTION INTRAMUSCULAR; INTRAVENOUS at 07:10

## 2019-10-01 RX ADMIN — CEFAZOLIN 3 G: 330 INJECTION, POWDER, FOR SOLUTION INTRAMUSCULAR; INTRAVENOUS at 07:10

## 2019-10-01 RX ADMIN — ACETAMINOPHEN 1000 MG: 500 TABLET ORAL at 09:10

## 2019-10-01 RX ADMIN — OXYCODONE HYDROCHLORIDE 5 MG: 5 TABLET ORAL at 12:10

## 2019-10-01 RX ADMIN — PROPOFOL 60 MG: 10 INJECTION, EMULSION INTRAVENOUS at 07:10

## 2019-10-01 RX ADMIN — LIDOCAINE HYDROCHLORIDE 100 MG: 20 INJECTION, SOLUTION INTRAVENOUS at 07:10

## 2019-10-01 RX ADMIN — ROCURONIUM BROMIDE 10 MG: 10 INJECTION, SOLUTION INTRAVENOUS at 09:10

## 2019-10-01 RX ADMIN — FAMOTIDINE 20 MG: 20 TABLET, FILM COATED ORAL at 05:10

## 2019-10-01 RX ADMIN — Medication 10 MG: at 11:10

## 2019-10-01 RX ADMIN — SODIUM CHLORIDE, SODIUM LACTATE, POTASSIUM CHLORIDE, AND CALCIUM CHLORIDE: 600; 310; 30; 20 INJECTION, SOLUTION INTRAVENOUS at 06:10

## 2019-10-01 RX ADMIN — ROCURONIUM BROMIDE 50 MG: 10 INJECTION, SOLUTION INTRAVENOUS at 07:10

## 2019-10-01 RX ADMIN — ONDANSETRON 4 MG: 2 INJECTION INTRAMUSCULAR; INTRAVENOUS at 11:10

## 2019-10-01 RX ADMIN — KETOROLAC TROMETHAMINE 15 MG: 30 INJECTION, SOLUTION INTRAMUSCULAR at 07:10

## 2019-10-01 RX ADMIN — SODIUM CHLORIDE, SODIUM LACTATE, POTASSIUM CHLORIDE, AND CALCIUM CHLORIDE: .6; .31; .03; .02 INJECTION, SOLUTION INTRAVENOUS at 03:10

## 2019-10-01 RX ADMIN — HYDROMORPHONE HYDROCHLORIDE 0.4 MG: 2 INJECTION, SOLUTION INTRAMUSCULAR; INTRAVENOUS; SUBCUTANEOUS at 01:10

## 2019-10-01 RX ADMIN — ACETAMINOPHEN 1000 MG: 500 TABLET ORAL at 04:10

## 2019-10-01 RX ADMIN — CELECOXIB 100 MG: 100 CAPSULE ORAL at 05:10

## 2019-10-01 RX ADMIN — PROPOFOL 50 MG: 10 INJECTION, EMULSION INTRAVENOUS at 08:10

## 2019-10-01 RX ADMIN — OXYCODONE HYDROCHLORIDE 5 MG: 5 TABLET ORAL at 09:10

## 2019-10-01 RX ADMIN — DEXAMETHASONE SODIUM PHOSPHATE 8 MG: 4 INJECTION, SOLUTION INTRAMUSCULAR; INTRAVENOUS at 11:10

## 2019-10-01 RX ADMIN — GABAPENTIN 300 MG: 300 CAPSULE ORAL at 05:10

## 2019-10-01 NOTE — PLAN OF CARE
Discharge Planning:  Patient admitted on 10-1-19  LOS-day 0  Chart reviewed, Care plan discussed with Ms Becker  Discussed care plan with treatment team,  attending Dr Dumont  Consults following are: case mgt  PCP updated in [facility]: yes  Pharmacy, updated in [facility]: [facility]  [address]  Insurance: medicaid, [employer]  DME at home: n/a  Current dispo:  Surgery today, await rec's  Just arrived to room 382  Transportation: has reliable  Case management to follow       10/01/19 1609   Discharge Assessment   Assessment Type Discharge Planning Assessment   Confirmed/corrected address and phone number on facesheet? Yes   Assessment information obtained from? Patient;Caregiver;Medical Record   Communicated expected length of stay with patient/caregiver yes   Prior to hospitilization cognitive status: Alert/Oriented   Prior to hospitalization functional status: Independent   Current cognitive status: Alert/Oriented   Current Functional Status: Assistive Equipment;Needs Assistance   Lives With alone   Able to Return to Prior Arrangements yes   Is patient able to care for self after discharge? Yes   Readmission Within the Last 30 Days no previous admission in last 30 days   Patient currently being followed by outpatient case management? No   Patient currently receives any other outside agency services? No   Equipment Currently Used at Home none   Do you have any problems affording any of your prescribed medications? No   Is the patient taking medications as prescribed? yes   Does the patient have transportation home? Yes   Transportation Anticipated family or friend will provide   Discharge Plan A Home   DME Needed Upon Discharge  none   Patient/Family in Agreement with Plan yes

## 2019-10-01 NOTE — ANESTHESIA POSTPROCEDURE EVALUATION
Anesthesia Post Evaluation    Patient: Denisha Becker    Procedure(s) Performed: Procedure(s) (LRB):  ROBOTIC HYSTERECTOMY (N/A)  SALPINGO-OOPHORECTOMY (Bilateral)  XI ROBOTIC LYMPHADENECTOMY, PELVIC (Bilateral)  XI ROBOTIC LYSIS, ADHESIONS  MINI LAPAROTOMY (N/A)  BIOPSY, omental    Final Anesthesia Type: general  Patient location during evaluation: PACU  Patient participation: Yes- Able to Participate  Level of consciousness: awake and alert  Post-procedure vital signs: reviewed and stable  Pain management: adequate  Airway patency: patent  PONV status at discharge: No PONV  Anesthetic complications: no      Cardiovascular status: blood pressure returned to baseline  Respiratory status: unassisted and spontaneous ventilation  Hydration status: euvolemic  Follow-up not needed.          Vitals Value Taken Time   /57 10/1/2019 12:55 PM   Temp 37 °C (98.6 °F) 10/1/2019 12:07 PM   Pulse 75 10/1/2019  1:04 PM   Resp 18 10/1/2019 12:55 PM   SpO2 97 % 10/1/2019  1:04 PM   Vitals shown include unvalidated device data.      No case tracking events are documented in the log.      Pain/Abigail Score: Pain Rating Prior to Med Admin: 10 (10/1/2019 12:44 PM)  Abigail Score: 8 (10/1/2019 12:55 PM)

## 2019-10-01 NOTE — OP NOTE
[facility]    Procedure(s) (LRB):  ROBOTIC HYSTERECTOMY (N/A)  SALPINGO-OOPHORECTOMY (Bilateral)  XI ROBOTIC LYMPHADENECTOMY, PELVIC (Bilateral)  XI ROBOTIC LYSIS, ADHESIONS  MINI LAPAROTOMY (N/A)  BIOPSY, omental    DATE OF SURGERY  10/1/2019     Surgeon(s) and Role:  Primary: Scottie Dumont MD  Assisting: Melo Nielsen MD  Resident - Assisting: Marta Johns MD     ANESTHESIA TYPE  General     PRE-OPERATIVE DIAGNOSIS  Malignant neoplasm of endometrium [C54.1]    POST-OPERATIVE DIAGNOSIS  Post-Op Diagnosis Codes:     * Malignant neoplasm of endometrium [C54.1]    FINDINGS:  Omental adhesions to the abdominal wall.  Sigmoid adhesions to the pelvic sidewall.  Fibroid and boggy uterus.  Normal looking tubes and ovaries.  Left sentinel lymph node mapping at what appeared to the be the external iliac vessels but there was too much background staining after completion of the uterus to perform a sentinel lymph node biopsy.  No right sentinel lymph node mapping.      Procedure in Detail: The patient was prepped and draped in synchronous position in [facility]. After sterile prep and drape, the cervix was injected at 3 and 9 o'clock with ICG dye, and a Humphreys catheter was then inserted. Gloves and gowns were changed, and we began by directly entering the abdomen.  A small incision was made at the umbilicus using a Hossan trocar, the peritoneal cavity was entered, and a pneumoperitoneum was established including trocar placement.  The laparoscope was inserted and examination of the peritoneal cavity revealed the operative findings above. The remaining 3 robotic ports and assistant port were placed under direct vision and the robot was docked. We began by dividing the round ligament, developing the pararectal space, and identifying the ureter. A [place]ana lymph node was identified in the left but there wasn't enough mobility to biopsy it.  Instead we proceeded with hysterectomy.   Beginning on the right side, the ureter was identified, and the utero-ovarian ligament was sealed and transected using bipolar coagulation. The cardinal ligaments were skeletonized, the ureter visualized laterally, and the uterine vessels were ligated and divided using bipolar coagulation. An identical procedure was performed on the left side.  The bladder flap was developed and the uterosacral ligaments were transected.   The vagina was incised, and a circumferential incision was made, removing the uterus, tubes and ovaries transvaginally. The vagina was closed in a single fashion using  Vicryl suture, incorporating the uterosacral ligaments into the cuff closure. The ureters were followed from the pelvic brim to the vaginal cuff closure, and were not compromised. Hemostasis was satisfactory.  We then proceeded with the pelvic lymphadenectomy.  The right retroperitoneum was entered, the paravesical and pararectal spaces were developed, and a right pelvic lymphadenectomy was performed in a systematic fashion, submitting the fibrofatty tissue from the externa and internal lymph node-bearing areas as well as from the obturator space.  Due to the patient's obesity, we did not attempt to remove any lymph nodes in the common iliac or para-aortic region.  Care was taken to identify vascular and nerve structures to avoid compromise. Hemostasis was ascertained and Hemoblast was applied to the retroperitoneum and vaginal cuff.  We then extended the umbilical incision and fascia and removed the cervix, uterus, tubes, ovaries, and bilateral pelvic lymph nodes (which were stored in a bag).  The mini-Laporatomy was closed with 0 PDS x2.  The trocars were removed, and the fascia of assistant port was closed with a Rustam-Hewitt using Vicryl suture.  The skin of all sites was closed with 3-0 Monocryl.  The patient was taken to recovery in stable condition.    Salt, needle, sponge, and instrument count was correct.  I was present  and scrubbed for the entirety of the case.     UNUSUAL CIRCUMSTANCES  Yes. Morbid obesity with the issues/problems caused by the patient's obesity.    CONDITION  acute    POST-OP COMPLICATION  No    DIABETIC  No    SPECIMENS  Specimens (From admission, onward)     Start     Ordered    10/01/19 1121  Specimen to Pathology - Surgery  Once      10/01/19 1122                 DRAINS       Urethral Catheter 10/01/19 0715 Non-latex 16 Fr. (Active)   Site Assessment Clean;Intact 10/1/2019  1:40 PM   Collection Container Urimeter 10/1/2019  1:40 PM   Securement Method secured to top of thigh w/ adhesive device 10/1/2019  1:40 PM   Output (mL) 150 mL 10/1/2019  1:25 PM        ESTIMATED BLOOD LOSS  50 mL           IMPLANTS  * No implants in log *

## 2019-10-01 NOTE — TRANSFER OF CARE
"Anesthesia Transfer of Care Note    Patient: Denisha Becker    Procedure(s) Performed: Procedure(s) (LRB):  ROBOTIC HYSTERECTOMY (N/A)  SALPINGO-OOPHORECTOMY (Bilateral)  XI ROBOTIC LYMPHADENECTOMY, PELVIC (Bilateral)  XI ROBOTIC LYSIS, ADHESIONS  MINI LAPAROTOMY (N/A)  BIOPSY, omental    Patient location: PACU    Anesthesia Type: general    Transport from OR: Transported from OR on 2-3 L/min O2 by NC with adequate spontaneous ventilation    Post pain: pain needs to be addressed    Post assessment: no apparent anesthetic complications and tolerated procedure well    Post vital signs: stable    Level of consciousness: awake, alert and oriented    Nausea/Vomiting: no nausea/vomiting    Complications: none    Transfer of care protocol was followed      Last vitals:   Visit Vitals  /63 (BP Location: Right arm, Patient Position: Sitting)   Pulse (!) 58   Temp 36.6 °C (97.9 °F) (Oral)   Resp 16   Ht 5' 6" (1.676 m)   Wt 125.6 kg (277 lb)   LMP 09/02/2019   SpO2 97%   Breastfeeding? No   BMI 44.71 kg/m²     "

## 2019-10-01 NOTE — PLAN OF CARE
POD 0: - Laps x4 c SS / Plasters, Island dressing to Umbilical,    Received to room from PACU, RN c family at side. VSS on 3L/O2/NC & afebrile. Ox4, still slightly sedated--arousable to voice. Oriented to room, POC, & all equipment. Surgical dressing CDI w/ Humphreys secure. Bowel sounds active, clear liquid diet initiated. Fall precautions implemented & call light in reach. Resting comfortably in low bed, in NAD.        5:11 PM    Paged to Notify(contradicting) & Clarify orders c GYN, 2

## 2019-10-01 NOTE — INTERVAL H&P NOTE
The patient has been examined and the H&P has been reviewed:    I concur with the findings and no changes have occurred since H&P was written.    Anesthesia/Surgery risks, benefits and alternative options discussed and understood by patient/family.    Active Hospital Problems    Diagnosis  POA    Malignant neoplasm of endometrium [C54.1]  Yes      Resolved Hospital Problems   No resolved problems to display.     Patient doing well this AM  No questions or concerns  Surgical plan reviewed  To OR for planned procedures    Marta Johns MD, PhD  OBGYN, PGY-4

## 2019-10-02 PROBLEM — Z90.710 HISTORY OF ROBOT-ASSISTED LAPAROSCOPIC HYSTERECTOMY: Status: ACTIVE | Noted: 2019-10-02

## 2019-10-02 LAB
CREAT SERPL-MCNC: 1 MG/DL (ref 0.5–1.4)
EST. GFR  (AFRICAN AMERICAN): >60 ML/MIN/1.73 M^2
EST. GFR  (NON AFRICAN AMERICAN): >60 ML/MIN/1.73 M^2

## 2019-10-02 PROCEDURE — 99024 POSTOP FOLLOW-UP VISIT: CPT | Mod: ,,, | Performed by: OBSTETRICS & GYNECOLOGY

## 2019-10-02 PROCEDURE — 36415 COLL VENOUS BLD VENIPUNCTURE: CPT

## 2019-10-02 PROCEDURE — 11000001 HC ACUTE MED/SURG PRIVATE ROOM

## 2019-10-02 PROCEDURE — 94761 N-INVAS EAR/PLS OXIMETRY MLT: CPT

## 2019-10-02 PROCEDURE — 82565 ASSAY OF CREATININE: CPT

## 2019-10-02 PROCEDURE — 94799 UNLISTED PULMONARY SVC/PX: CPT

## 2019-10-02 PROCEDURE — 63600175 PHARM REV CODE 636 W HCPCS: Performed by: STUDENT IN AN ORGANIZED HEALTH CARE EDUCATION/TRAINING PROGRAM

## 2019-10-02 PROCEDURE — 25000003 PHARM REV CODE 250: Performed by: STUDENT IN AN ORGANIZED HEALTH CARE EDUCATION/TRAINING PROGRAM

## 2019-10-02 PROCEDURE — 99024 PR POST-OP FOLLOW-UP VISIT: ICD-10-PCS | Mod: ,,, | Performed by: OBSTETRICS & GYNECOLOGY

## 2019-10-02 RX ORDER — IBUPROFEN 600 MG/1
600 TABLET ORAL 3 TIMES DAILY
Qty: 30 TABLET | Refills: 1 | Status: SHIPPED | OUTPATIENT
Start: 2019-10-02 | End: 2020-02-13

## 2019-10-02 RX ORDER — OXYCODONE HYDROCHLORIDE 5 MG/1
5 TABLET ORAL EVERY 4 HOURS PRN
Qty: 10 TABLET | Refills: 0 | Status: SHIPPED | OUTPATIENT
Start: 2019-10-02 | End: 2019-10-05 | Stop reason: SDUPTHER

## 2019-10-02 RX ORDER — HEPARIN SODIUM 5000 [USP'U]/ML
5000 INJECTION, SOLUTION INTRAVENOUS; SUBCUTANEOUS ONCE
Status: COMPLETED | OUTPATIENT
Start: 2019-10-02 | End: 2019-10-02

## 2019-10-02 RX ADMIN — KETOROLAC TROMETHAMINE 15 MG: 30 INJECTION, SOLUTION INTRAMUSCULAR at 06:10

## 2019-10-02 RX ADMIN — SIMETHICONE CHEW TAB 80 MG 80 MG: 80 TABLET ORAL at 04:10

## 2019-10-02 RX ADMIN — SENNOSIDES 8.6 MG: 8.6 TABLET, FILM COATED ORAL at 08:10

## 2019-10-02 RX ADMIN — OXYCODONE HYDROCHLORIDE 5 MG: 5 TABLET ORAL at 06:10

## 2019-10-02 RX ADMIN — KETOROLAC TROMETHAMINE 15 MG: 30 INJECTION, SOLUTION INTRAMUSCULAR at 01:10

## 2019-10-02 RX ADMIN — ACETAMINOPHEN 1000 MG: 500 TABLET ORAL at 10:10

## 2019-10-02 RX ADMIN — ACETAMINOPHEN 1000 MG: 500 TABLET ORAL at 04:10

## 2019-10-02 RX ADMIN — OXYCODONE HYDROCHLORIDE 5 MG: 5 TABLET ORAL at 01:10

## 2019-10-02 RX ADMIN — HEPARIN SODIUM 5000 UNITS: 5000 INJECTION, SOLUTION INTRAVENOUS; SUBCUTANEOUS at 11:10

## 2019-10-02 RX ADMIN — OXYCODONE HYDROCHLORIDE 5 MG: 5 TABLET ORAL at 09:10

## 2019-10-02 RX ADMIN — SIMETHICONE CHEW TAB 80 MG 80 MG: 80 TABLET ORAL at 08:10

## 2019-10-02 RX ADMIN — KETOROLAC TROMETHAMINE 15 MG: 30 INJECTION, SOLUTION INTRAMUSCULAR at 11:10

## 2019-10-02 RX ADMIN — HYDROMORPHONE HYDROCHLORIDE 0.5 MG: 1 INJECTION, SOLUTION INTRAMUSCULAR; INTRAVENOUS; SUBCUTANEOUS at 08:10

## 2019-10-02 RX ADMIN — ACETAMINOPHEN 1000 MG: 500 TABLET ORAL at 08:10

## 2019-10-02 RX ADMIN — KETOROLAC TROMETHAMINE 15 MG: 30 INJECTION, SOLUTION INTRAMUSCULAR at 05:10

## 2019-10-02 RX ADMIN — OXYCODONE HYDROCHLORIDE 5 MG: 5 TABLET ORAL at 05:10

## 2019-10-02 NOTE — DISCHARGE SUMMARY
[facility]  Obstetrics & Gynecology  Discharge Summary    Patient Name: Denisha Becker  MRN: [id number]  Admission Date: 10/1/2019  Hospital Length of Stay: 1 days  Discharge Date and Time:  10/02/2019 10:17 AM  Attending Physician: Scottie Dumont MD   Discharging Provider: Marta Johns MD  Primary Care Provider: [facility]    HPI:  Denisha Becker is a 64 y.o. female here for scheduled RALH/BSO/staging for endometrial carcinosarcoma.    Hospital Course:  10/02/2019 POD #1 s/p RALH/BSO/Omentectomy/LND for endometrial carcinosarcoma. Pain control was suboptimal overnight, but improving this AM. She tolerated some PO last night and is eating breakfast this AM. Drinking well. Guido was removed this AM and awaiting spontaneous void. Cr was stable this AM 1.0. Medically stable for discharge to home today with plan for RTC for routine post-operative care in 2-4 weeks.     ADDENDUM:   Patient elected to stay an additional night for optimization of pain control and awaiting spontaneous void s/p guido catheter. Routine care was continued overnight and she was significantly improved this AM, POD #2. Medically stable for discharge to home today. Will RTC in 2-4 weeks for routine post-op care and oncology treatment planning.    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    Contact information:  [address]  [facility] [id number] [phone]             Go to Jun Rosa MD.    Specialties:  Gynecology, Urology  Contact information:  [address]  [facility] [address]  [phone]                 Patient Instructions:      Diet general     Lifting restrictions   Order Comments: No lifting greater than 15 pounds for six weeks.     Other restrictions (specify):   Order Comments: No alcoholic beverages while taking narcotic pain medication.     Remove dressing in 24 hours   Order Comments: If you have a bandage on wound, you may remove it the day after dismissal. Keep incision clean and dry.     Call MD for:  persistent dizziness or light-headedness     Call MD for:  redness, tenderness, or signs of infection (pain, swelling, redness, odor or green/yellow discharge around incision site)     Call MD for:  difficulty breathing, headache or visual disturbances     Call MD for:  severe uncontrolled pain     Call MD for:  persistent nausea and vomiting     Call MD for:  temperature >100.4     Call MD for:  extreme fatigue     Activity as tolerated   Order Comments: Return to your normal activities slowly as you feel able. For six weeks your exercise should be limited to walking. You may walk as far as you wish, as long as you increase your level of exertion gradually and avoid slippery surfaces.     Shower on day dressing removed (No bath)   Order Comments: You may shower at any time but should avoid immersing and abdominal incisions in water for at least two weeks after surgery or until the would is completely healed. If given, please shower with Hibiclens soap until bottle is completely finished.     Medications:  Reconciled Home Medications:      Medication List      START taking these medications    oxyCODONE 5 MG immediate release  tablet  Commonly known as:  ROXICODONE  Take 1 tablet (5 mg total) by mouth every 4 (four) hours as needed for Pain.        CONTINUE taking these medications    cetirizine 10 MG tablet  Commonly known as:  ZYRTEC  daily as needed.     clotrimazole 1 % cream  Commonly known as:  LOTRIMIN  GERMAN EXT AA BID FOR 7 DAYS     fluconazole 150 MG Tab  Commonly known as:  DIFLUCAN  TK 1 T PO QD FOR 1 DAY     ibuprofen 600 MG tablet  Commonly known as:  ADVIL,MOTRIN  Take 1 tablet (600 mg total) by mouth 3 (three) times daily.            Marta Johns MD  Obstetrics & Gynecology  [facility]

## 2019-10-02 NOTE — HPI
Denisha Becker is a 64 y.o. female here for scheduled RALH/BSO/staging for endometrial carcinosarcoma.

## 2019-10-02 NOTE — NURSING
VSS and afebrile. AAOx4. Dressing CDI. Regular diet tolerated well. Appetite fair. Pain well controlled with PO & IV medications. Plan of care reviewed with patient. Purposeful hourly rounding done. Call light at bedside. Bed at lowest position, with breaks on. Non skid socks on. Will continue to monitor.  Patient's guido removed at 0515.  Voiding & measuring education done, hat in commode.

## 2019-10-02 NOTE — SUBJECTIVE & OBJECTIVE
Interval History:   POD #1 s/p RALH/BSO/Omentectomy/LND for endometrial carcinosarcoma. Pain control was suboptimal overnight, but improving this AM. She tolerated some PO last night and is eating breakfast this AM. Drinking well. Humphreys was removed this AM and awaiting spontaneous void. Cr was stable this AM 1.0. Medically stable for discharge to home today with plan for RTC for routine post-operative care in 2-4 weeks.    Scheduled Meds:   acetaminophen  1,000 mg Oral Q6H    heparin (porcine)  5,000 Units Subcutaneous Once    ketorolac  15 mg Intravenous Q6H     Continuous Infusions:  PRN Meds:HYDROmorphone, ondansetron, oxyCODONE, oxyCODONE, promethazine (PHENERGAN) IVPB, senna, simethicone    Review of patient's allergies indicates:  No Known Allergies    Objective:     Vital Signs (Most Recent):  Temp: 98.1 °F (36.7 °C) (10/02/19 0821)  Pulse: 60 (10/02/19 0821)  Resp: 18 (10/02/19 0821)  BP: (!) 114/55 (10/02/19 0821)  SpO2: 95 % (10/02/19 0821) Vital Signs (24h Range):  Temp:  [97.8 °F (36.6 °C)-98.8 °F (37.1 °C)] 98.1 °F (36.7 °C)  Pulse:  [58-77] 60  Resp:  [16-18] 18  SpO2:  [93 %-97 %] 95 %  BP: (114-146)/(55-68) 114/55     Weight: 125.6 kg (277 lb)  Body mass index is 44.71 kg/m².  Patient's last menstrual period was 09/02/2019.    I&O (Last 24H):    Intake/Output Summary (Last 24 hours) at 10/2/2019 1003  Last data filed at 10/2/2019 0500  Gross per 24 hour   Intake 2819.33 ml   Output 2245 ml   Net 574.33 ml       Physical Exam:   Constitutional: She is oriented to person, place, and time. She appears well-developed and well-nourished.    HENT:   Head: Normocephalic and atraumatic.    Eyes: Pupils are equal, round, and reactive to light. EOM are normal.    Neck: Normal range of motion. Neck supple.    Cardiovascular: Normal rate, regular rhythm and normal heart sounds.     Pulmonary/Chest: Effort normal and breath sounds normal. No respiratory distress.        Abdominal: Soft. Bowel sounds are  normal. She exhibits distension and abdominal incision. There is tenderness. There is no rebound and no guarding.   LSC incisions are c/d/i with steri strips in place. Mild/appropriate TTP and distention. Bowel sounds are present throughout.     Genitourinary:   Genitourinary Comments: deferred           Musculoskeletal: Normal range of motion.       Neurological: She is alert and oriented to person, place, and time.    Skin: Skin is warm and dry. No rash noted.    Psychiatric: She has a normal mood and affect. Her behavior is normal. Judgment and thought content normal.       Laboratory:  Recent Labs   Lab 10/02/19  0504   CREATININE 1.0     Diagnostic Results:  Pathology pending

## 2019-10-02 NOTE — PROGRESS NOTES
[facility]  Obstetrics & Gynecology  Progress Note    Patient Name: Denisha Becker  MRN: [id number]  Admission Date: 10/1/2019  Primary Care Provider: [facility]  Principal Problem: Malignant neoplasm of endometrium    Subjective:     HPI:  Denisha Becker is a 64 y.o. female here for scheduled RALH/BSO/staging for endometrial carcinosarcoma.    Interval History:   POD #1 s/p RALH/BSO/Omentectomy/LND for endometrial carcinosarcoma. Pain control was suboptimal overnight, but improving this AM. She tolerated some PO last night and is eating breakfast this AM. Drinking well. Humphreys was removed this AM and awaiting spontaneous void. Cr was stable this AM 1.0. Medically stable for discharge to home today with plan for RTC for routine post-operative care in 2-4 weeks.    Scheduled Meds:   acetaminophen  1,000 mg Oral Q6H    heparin (porcine)  5,000 Units Subcutaneous Once    ketorolac  15 mg Intravenous Q6H     Continuous Infusions:  PRN Meds:HYDROmorphone, ondansetron, oxyCODONE, oxyCODONE, promethazine (PHENERGAN) IVPB, senna, simethicone    Review of patient's allergies indicates:  No Known Allergies    Objective:     Vital Signs (Most Recent):  Temp: 98.1 °F (36.7 °C) (10/02/19 0821)  Pulse: 60 (10/02/19 0821)  Resp: 18 (10/02/19 0821)  BP: (!) 114/55 (10/02/19 0821)  SpO2: 95 % (10/02/19 0821) Vital Signs (24h Range):  Temp:  [97.8 °F (36.6 °C)-98.8 °F (37.1 °C)] 98.1 °F (36.7 °C)  Pulse:  [58-77] 60  Resp:  [16-18] 18  SpO2:  [93 %-97 %] 95 %  BP: (114-146)/(55-68) 114/55     Weight: 125.6 kg (277 lb)  Body mass index is 44.71 kg/m².  Patient's last menstrual period was 09/02/2019.    I&O (Last 24H):    Intake/Output Summary (Last 24 hours) at 10/2/2019 1003  Last data filed at 10/2/2019 0500  Gross per 24 hour   Intake 2819.33 ml   Output 2245 ml   Net 574.33 ml       Physical Exam:   Constitutional: She is oriented to person, place, and time. She appears well-developed  and well-nourished.    HENT:   Head: Normocephalic and atraumatic.    Eyes: Pupils are equal, round, and reactive to light. EOM are normal.    Neck: Normal range of motion. Neck supple.    Cardiovascular: Normal rate, regular rhythm and normal heart sounds.     Pulmonary/Chest: Effort normal and breath sounds normal. No respiratory distress.        Abdominal: Soft. Bowel sounds are normal. She exhibits distension and abdominal incision. There is tenderness. There is no rebound and no guarding.   LSC incisions are c/d/i with steri strips in place. Mild/appropriate TTP and distention. Bowel sounds are present throughout.     Genitourinary:   Genitourinary Comments: deferred           Musculoskeletal: Normal range of motion.       Neurological: She is alert and oriented to person, place, and time.    Skin: Skin is warm and dry. No rash noted.    Psychiatric: She has a normal mood and affect. Her behavior is normal. Judgment and thought content normal.       Laboratory:  Recent Labs   Lab 10/02/19  0504   CREATININE 1.0     Diagnostic Results:  Pathology pending    Assessment/Plan:     * Malignant neoplasm of endometrium  - s/p listed procedures    s/p RALH/BSO, OMX, pelvic LND 10/1/2019  - Procedure complications: none  - EBL: 25 cc  - IVF @ 40 cc/h; d/c when tolerating PO  - Diet: regular, tolerating  - Pain control: ibuprofen/tylenol, oxy IR 5/10 mg, 0.5 mg dilaudid BTP  - In/Out: awaiting spontaneous void s/p Humphreys   - PRN: simethicone, zofran, phenergan, benadryl  - Chem: Cr 1.0  - PPX: ambulation encouraged, IS encouraged, heparin 5K units today, no home anticoagulation indicated, TEDs/SCDs in place    Disposition: Meeting post-operative milestones appropriately. Will plan for discharge to home today, POD #1 with palak for RTC in the next 2-4 weeks for routine post-operative care and oncology treatment planning.           Marta Johns MD  Obstetrics & Gynecology  [facility]

## 2019-10-02 NOTE — ASSESSMENT & PLAN NOTE
- Procedure complications: none  - EBL: 25 cc  - IVF @ 40 cc/h; d/c when tolerating PO  - Diet: regular, tolerating  - Pain control: ibuprofen/tylenol, oxy IR 5/10 mg, 0.5 mg dilaudid BTP  - In/Out: awaiting spontaneous void s/p Humphreys   - PRN: simethicone, zofran, phenergan, benadryl  - Chem: Cr 1.0  - PPX: ambulation encouraged, IS encouraged, heparin 5K units today, no home anticoagulation indicated, TEDs/SCDs in place    Disposition: Meeting post-operative milestones appropriately. Will plan for discharge to home today, POD #1 with palak for RTC in the next 2-4 weeks for routine post-operative care and oncology treatment planning.

## 2019-10-02 NOTE — NURSING
In/out cath performed collected 400ml of clear yellow urine; bladder not distended at the present time will continue to monitor for changes in condition.

## 2019-10-02 NOTE — PLAN OF CARE
Patient is discharged home with self care.     No CM needs for discharge.     Family will transport the patient home.     10/02/19 1100   Final Note   Assessment Type Final Discharge Note   Anticipated Discharge Disposition Home   What phone number can be called within the next 1-3 days to see how you are doing after discharge? [phone]   Right Care Referral Info   Post Acute Recommendation No Care

## 2019-10-02 NOTE — HOSPITAL COURSE
10/02/2019 POD #1 s/p RALH/BSO/Omentectomy/LND for endometrial carcinosarcoma. Pain control was suboptimal overnight, but improving this AM. She tolerated some PO last night and is eating breakfast this AM. Drinking well. Guido was removed this AM and awaiting spontaneous void. Cr was stable this AM 1.0. Medically stable for discharge to home today with plan for RTC for routine post-operative care in 2-4 weeks.   10/03/2019 POD #2. Patient stayed overnight for optimization of pain control and inability to void s/p guido catheter. She was straight catheterized overnight and able to void later in the evening. No issues with spontaneous voiding this AM. Pain is well controlled. Feeling better with addition of abdominal binder. Tolerating PO and drinking well. Ambulating in the halls overnight and this AM. Medically stable for discharge to home today.

## 2019-10-03 VITALS
WEIGHT: 277 LBS | RESPIRATION RATE: 18 BRPM | SYSTOLIC BLOOD PRESSURE: 137 MMHG | HEIGHT: 66 IN | TEMPERATURE: 98 F | DIASTOLIC BLOOD PRESSURE: 63 MMHG | OXYGEN SATURATION: 98 % | HEART RATE: 63 BPM | BODY MASS INDEX: 44.52 KG/M2

## 2019-10-03 PROCEDURE — 63600175 PHARM REV CODE 636 W HCPCS: Performed by: STUDENT IN AN ORGANIZED HEALTH CARE EDUCATION/TRAINING PROGRAM

## 2019-10-03 PROCEDURE — 99024 PR POST-OP FOLLOW-UP VISIT: ICD-10-PCS | Mod: ,,, | Performed by: OBSTETRICS & GYNECOLOGY

## 2019-10-03 PROCEDURE — 99024 POSTOP FOLLOW-UP VISIT: CPT | Mod: ,,, | Performed by: OBSTETRICS & GYNECOLOGY

## 2019-10-03 PROCEDURE — 25000003 PHARM REV CODE 250: Performed by: STUDENT IN AN ORGANIZED HEALTH CARE EDUCATION/TRAINING PROGRAM

## 2019-10-03 RX ADMIN — ACETAMINOPHEN 1000 MG: 500 TABLET ORAL at 10:10

## 2019-10-03 RX ADMIN — KETOROLAC TROMETHAMINE 15 MG: 30 INJECTION, SOLUTION INTRAMUSCULAR at 06:10

## 2019-10-03 RX ADMIN — KETOROLAC TROMETHAMINE 15 MG: 30 INJECTION, SOLUTION INTRAMUSCULAR at 12:10

## 2019-10-03 RX ADMIN — KETOROLAC TROMETHAMINE 15 MG: 30 INJECTION, SOLUTION INTRAMUSCULAR at 10:10

## 2019-10-03 RX ADMIN — OXYCODONE HYDROCHLORIDE 5 MG: 5 TABLET ORAL at 06:10

## 2019-10-03 RX ADMIN — HYDROMORPHONE HYDROCHLORIDE 0.5 MG: 1 INJECTION, SOLUTION INTRAMUSCULAR; INTRAVENOUS; SUBCUTANEOUS at 12:10

## 2019-10-03 NOTE — PROGRESS NOTES
[facility]  Obstetrics & Gynecology  Progress Note    Patient Name: Denisha Becker  MRN: [id number]  Admission Date: 10/1/2019  [facility] Provider: [facility]  Principal Problem: Malignant neoplasm of endometrium    Subjective:     HPI:  Denisha Becker is a 64 y.o. female here for scheduled RALH/BSO/staging for endometrial carcinosarcoma.    Interval History:   POD #2. Patient stayed overnight for optimization of pain control and inability to void s/p guido catheter. She was straight catheterized overnight and able to void later in the evening. No issues with spontaneous voiding this AM. Pain is well controlled. Feeling better with addition of abdominal binder. Tolerating PO and drinking well. Ambulating in the halls overnight and this AM. Medically stable for discharge to home today.    Scheduled Meds:   acetaminophen  1,000 mg Oral Q6H    ketorolac  15 mg Intravenous Q6H     Continuous Infusions:  PRN Meds:HYDROmorphone, ondansetron, oxyCODONE, oxyCODONE, promethazine (PHENERGAN) IVPB, senna, simethicone    Review of patient's allergies indicates:  No Known Allergies    Objective:     Vital Signs (Most Recent):  Temp: 97.6 °F (36.4 °C) (10/03/19 0442)  Pulse: 64 (10/03/19 0442)  Resp: 17 (10/03/19 0442)  BP: (!) 148/69 (10/03/19 0442)  SpO2: (!) 94 % (10/03/19 0442) Vital Signs (24h Range):  Temp:  [97.6 °F (36.4 °C)-98.3 °F (36.8 °C)] 97.6 °F (36.4 °C)  Pulse:  [59-68] 64  Resp:  [16-20] 17  SpO2:  [94 %-98 %] 94 %  BP: (111-148)/(55-69) 148/69     Weight: 125.6 kg (277 lb)  Body mass index is 44.71 kg/m².  Patient's last menstrual period was 09/02/2019.    I&O (Last 24H):    Intake/Output Summary (Last 24 hours) at 10/3/2019 0800  Last data filed at 10/3/2019 0600  Gross per 24 hour   Intake 1560 ml   Output 1550 ml   Net 10 ml       Physical Exam:   Constitutional: She is oriented to person, place, and time. She appears well-developed and well-nourished.    HENT:    Head: Normocephalic and atraumatic.    Eyes: Pupils are equal, round, and reactive to light. EOM are normal.    Neck: Normal range of motion. Neck supple.    Cardiovascular: Normal rate, regular rhythm and normal heart sounds.     Pulmonary/Chest: Effort normal and breath sounds normal. No respiratory distress.        Abdominal: Soft. Bowel sounds are normal. She exhibits distension and abdominal incision. There is tenderness. There is no rebound and no guarding.   LSC incisions are c/d/i with steri strips in place. Mild/appropriate TTP and distention improving . Bowel sounds are present throughout.     Genitourinary:   Genitourinary Comments: deferred           Musculoskeletal: Normal range of motion.       Neurological: She is alert and oriented to person, place, and time.    Skin: Skin is warm and dry. No rash noted.    Psychiatric: She has a normal mood and affect. Her behavior is normal. Judgment and thought content normal.     Laboratory:  No new labs    Recent Labs   Lab 10/02/19  0504   CREATININE 1.0      Diagnostic Results:  Pathology pending    Assessment/Plan:     * Malignant neoplasm of endometrium  - s/p listed procedures    s/p RALH/BSO, OMX, pelvic LND 10/1/2019  - Procedure complications: none  - EBL: 25 cc  - Diet: regular, tolerating  - Pain control: ibuprofen/tylenol, oxy IR 5/10 mg, 0.5 mg dilaudid BTP  - In/Out: voiding spontaneously   - PRN: simethicone, zofran, phenergan, benadryl  - No new labs  - PPX: ambulation encouraged, IS encouraged, heparin 5K units today, no home anticoagulation indicated, TEDs/SCDs in place    Disposition: Meeting post-operative milestones appropriately. Will plan for discharge to home today, POD #2 with palak for RTC in the next 2-4 weeks for routine post-operative care and oncology treatment planning.           Marta Johns MD  Obstetrics & Gynecology  [facility]

## 2019-10-03 NOTE — PLAN OF CARE
10:16 AM     In agreement and eager for DC.  Tolerating good PO and ambulating well.  Lap sites DI and no vaginal bleeding to peripad.  Voiding with great UOP.  To be DCd home p shower and bedside delivery of RX.  VU of DC instructions; paperwork. IV removed with cath tip intact, WNL.       Will be escorted downstairs via  transport team at [facility] and ready.  Free from falls or skin breakdown this hospital admission.     Dr Dumont and Andreas rounded.

## 2019-10-03 NOTE — ASSESSMENT & PLAN NOTE
- Procedure complications: none  - EBL: 25 cc  - Diet: regular, tolerating  - Pain control: ibuprofen/tylenol, oxy IR 5/10 mg, 0.5 mg dilaudid BTP  - In/Out: voiding spontaneously   - PRN: simethicone, zofran, phenergan, benadryl  - No new labs  - PPX: ambulation encouraged, IS encouraged, heparin 5K units today, no home anticoagulation indicated, TEDs/SCDs in place    Disposition: Meeting post-operative milestones appropriately. Will plan for discharge to home today, POD #2 with palak for RTC in the next 2-4 weeks for routine post-operative care and oncology treatment planning.

## 2019-10-03 NOTE — DISCHARGE INSTRUCTIONS
Discharge Instructions for Laparoscopic Hysterectomy  You had a procedure called laparoscopic hysterectomy. A surgeon removed your uterus using instruments inserted through small incisions in your abdomen. These incisions may be tender or sore. You may also have pain in your upper back or shoulders. This is from the gas used to enlarge your abdomen to allow your doctor to see inside your pelvis and perform the procedure. This pain usually goes away in a day or two. It usually takes from 1 to 4 weeks to recover from laparoscopic hysterectomy. Remember, though, that recovery time varies from woman to woman. Here's what you can do to speed your recovery following surgery.  Home care   · Continue the coughing and deep breathing exercises that you learned in the hospital.  · Take your medications exactly as directed by your doctor.  · Avoid constipation.  ¨ Eat fruits, vegetables, and whole grains.  ¨ Drink 6 to 8 glasses of water a day, unless told to do otherwise.  ¨ Use a laxative or a mild stool softener if your doctor says it's OK.  · Shower as usual. Wash your incisions with mild soap and water. Pat dry.  · Don't use oils, powders, or lotions on your incisions.  · Don't put anything in your vagina until your doctor says it's safe to do so. Don't use tampons or douches. Don't have sex.  · If you had both ovaries removed, report hot flashes, mood swings, and irritability to your doctor. There may be medications that can help you.  Activity  · Ask your doctor when you can start driving again. It's usually okay to drive as soon as you are free of pain and able to move comfortably from side to side. Don't drive while you are still taking opioid pain medications.  · Ask others to help with chores and errands while you recover.  · Dont lift anything heavier than 10 pounds for 6 weeks.  · Dont vacuum or do other strenuous activities until the doctor says it's OK.  · Walk as often as you feel able.  · Don't drive for  a few days after the surgery. You may drive as soon as you are able to move comfortably from side to side and when you are no longer taking narcotics.  · Climb stairs slowly and pause after every few steps.  Follow-up care  Make a follow-up appointment as directed by our staff.     When to call your doctor  Call your doctor right away if you have any of the following:  · Fever above 100.4°F (38°C) or chills  · Bright red vaginal bleeding or vaginal bleeding that soaks more than one sanitary pad per hour  · A foul smelling discharge from the vagina  · Trouble urinating or burning when you urinate  · Severe pain or bloating in your abdomen  · Redness, swelling, or drainage at your incision sites  · Shortness of breath or chest pain  · Nausea and vomiting   Date Last Reviewed: 5/19/2015  © 9946-3582 [employer]. [address] [place] [address]. All rights reserved. This information is not intended as a substitute for professional medical care. Always follow your healthcare professional's instructions.          Using an Incentive Spirometer  An incentive spirometer is a device that helps you do deep breathing exercises. These exercises will help you breathe better and improve the function of your lungs. The incentive spirometer gives you a way to take an active part in your recovery.  Follow these steps to use your incentive spirometer  Inhale normally. Relax and breathe out:  · Place your lips tightly around the mouthpiece.  · Make sure the device is upright and not tilted.  · Inhale as much air as you can through the mouthpiece (don't breathe through your nose). Some spirometers have an indicator to let you know that you are breathing in too fast. If the indicator goes off, breathe in more slowly.  · Hold your breath long enough to keep the balls (or disk) raised for at least 5 seconds.  · Do this exercise every hour while youre awake or as often as your healthcare provider instructs.  · If  you were also taught coughing exercises, do them regularly as instructed.  Follow-up care  Make a follow-up appointment as directed by our staff.     When to call the healthcare provider  Call your healthcare provider right away if you have any of the following:  · Shortness of breath that doesn't get better after taking your medicine  · Chest pain  · Fever of 100.4°F (38°C) or higher, or as directed by your healthcare provider  · Brownish, bloody, or smelly sputum  · Blue lips or fingernails   Date Last Reviewed: 5/1/2016  © 4006-1886 [employer]. [address] [place] [address]. All rights reserved. This information is not intended as a substitute for professional medical care. Always follow your healthcare professional's instructions.

## 2019-10-03 NOTE — SUBJECTIVE & OBJECTIVE
Interval History:   POD #2. Patient stayed overnight for optimization of pain control and inability to void s/p guido catheter. She was straight catheterized overnight and able to void later in the evening. No issues with spontaneous voiding this AM. Pain is well controlled. Feeling better with addition of abdominal binder. Tolerating PO and drinking well. Ambulating in the halls overnight and this AM. Medically stable for discharge to home today.    Scheduled Meds:   acetaminophen  1,000 mg Oral Q6H    ketorolac  15 mg Intravenous Q6H     Continuous Infusions:  PRN Meds:HYDROmorphone, ondansetron, oxyCODONE, oxyCODONE, promethazine (PHENERGAN) IVPB, senna, simethicone    Review of patient's allergies indicates:  No Known Allergies    Objective:     Vital Signs (Most Recent):  Temp: 97.6 °F (36.4 °C) (10/03/19 0442)  Pulse: 64 (10/03/19 0442)  Resp: 17 (10/03/19 0442)  BP: (!) 148/69 (10/03/19 0442)  SpO2: (!) 94 % (10/03/19 0442) Vital Signs (24h Range):  Temp:  [97.6 °F (36.4 °C)-98.3 °F (36.8 °C)] 97.6 °F (36.4 °C)  Pulse:  [59-68] 64  Resp:  [16-20] 17  SpO2:  [94 %-98 %] 94 %  BP: (111-148)/(55-69) 148/69     Weight: 125.6 kg (277 lb)  Body mass index is 44.71 kg/m².  Patient's last menstrual period was 09/02/2019.    I&O (Last 24H):    Intake/Output Summary (Last 24 hours) at 10/3/2019 0800  Last data filed at 10/3/2019 0600  Gross per 24 hour   Intake 1560 ml   Output 1550 ml   Net 10 ml       Physical Exam:   Constitutional: She is oriented to person, place, and time. She appears well-developed and well-nourished.    HENT:   Head: Normocephalic and atraumatic.    Eyes: Pupils are equal, round, and reactive to light. EOM are normal.    Neck: Normal range of motion. Neck supple.    Cardiovascular: Normal rate, regular rhythm and normal heart sounds.     Pulmonary/Chest: Effort normal and breath sounds normal. No respiratory distress.        Abdominal: Soft. Bowel sounds are normal. She exhibits distension  and abdominal incision. There is tenderness. There is no rebound and no guarding.   LSC incisions are c/d/i with steri strips in place. Mild/appropriate TTP and distention improving . Bowel sounds are present throughout.     Genitourinary:   Genitourinary Comments: deferred           Musculoskeletal: Normal range of motion.       Neurological: She is alert and oriented to person, place, and time.    Skin: Skin is warm and dry. No rash noted.    Psychiatric: She has a normal mood and affect. Her behavior is normal. Judgment and thought content normal.     Laboratory:  No new labs    Recent Labs   Lab 10/02/19  0504   CREATININE 1.0      Diagnostic Results:  Pathology pending   I will STOP taking the medications listed below when I get home from the hospital:  None

## 2019-10-04 ENCOUNTER — PATIENT MESSAGE (OUTPATIENT)
Dept: GYNECOLOGIC ONCOLOGY | Facility: CLINIC | Age: 64
End: 2019-10-04

## 2019-10-05 ENCOUNTER — NURSE TRIAGE (OUTPATIENT)
Dept: ADMINISTRATIVE | Facility: CLINIC | Age: 64
End: 2019-10-05

## 2019-10-05 RX ORDER — OXYCODONE HYDROCHLORIDE 5 MG/1
5 TABLET ORAL EVERY 4 HOURS PRN
Qty: 10 TABLET | Refills: 0 | Status: SHIPPED | OUTPATIENT
Start: 2019-10-05 | End: 2019-10-05 | Stop reason: SDUPTHER

## 2019-10-05 RX ORDER — OXYCODONE HYDROCHLORIDE 5 MG/1
5 TABLET ORAL EVERY 4 HOURS PRN
Qty: 10 TABLET | Refills: 0 | Status: SHIPPED | OUTPATIENT
Start: 2019-10-05 | End: 2019-10-09

## 2019-10-05 NOTE — PROGRESS NOTES
Patient called requesting Rx refill. Unable to E-scripe.  Rx left for  at [facility].  Oxy IR 5 mg, 10 tablets.   Patient to call clinic next week if continuing to have issues with pain control.     Marialuisa Reynoso MD  Ob/Gyn PGY-4

## 2019-10-07 ENCOUNTER — TELEPHONE (OUTPATIENT)
Dept: GYNECOLOGIC ONCOLOGY | Facility: CLINIC | Age: 64
End: 2019-10-07

## 2019-10-09 ENCOUNTER — TELEPHONE (OUTPATIENT)
Dept: GYNECOLOGIC ONCOLOGY | Facility: CLINIC | Age: 64
End: 2019-10-09

## 2019-10-09 ENCOUNTER — PATIENT MESSAGE (OUTPATIENT)
Dept: GYNECOLOGIC ONCOLOGY | Facility: CLINIC | Age: 64
End: 2019-10-09

## 2019-10-09 DIAGNOSIS — C54.1 MALIGNANT NEOPLASM OF ENDOMETRIUM: Primary | ICD-10-CM

## 2019-10-09 RX ORDER — OXYCODONE HYDROCHLORIDE 5 MG/1
5 CAPSULE ORAL EVERY 6 HOURS PRN
Qty: 10 CAPSULE | Refills: 0 | Status: SHIPPED | OUTPATIENT
Start: 2019-10-09 | End: 2019-10-09 | Stop reason: SDUPTHER

## 2019-10-09 RX ORDER — OXYCODONE HYDROCHLORIDE 5 MG/1
5 TABLET ORAL EVERY 6 HOURS PRN
Qty: 10 TABLET | Refills: 0 | Status: SHIPPED | OUTPATIENT
Start: 2019-10-09 | End: 2019-10-14

## 2019-10-15 ENCOUNTER — PATIENT MESSAGE (OUTPATIENT)
Dept: GYNECOLOGIC ONCOLOGY | Facility: CLINIC | Age: 64
End: 2019-10-15

## 2019-10-15 DIAGNOSIS — C54.1 MALIGNANT NEOPLASM OF ENDOMETRIUM: Primary | ICD-10-CM

## 2019-10-15 NOTE — TELEPHONE ENCOUNTER
64-year-old with a new diagnosis stage IA grade 3 endometrioid endometrial cancer status post total robotic hysterectomy, bilateral salpingo-oophorectomy, bilateral pelvic lymph node dissection, and omental biopsy.  Final pathology was consistent with a grade 3 endometrioid endometrial cancer.  Size of the cancer is unclear.  Myometrial invasion was 0.2/1.4 cm.  LVSI was absent.  Twenty-three pelvic lymph nodes were negative.  Omental biopsy was negative.  Cervix showed ERMIAS 3.  Tubes and ovaries were negative.  Risks of vaginal recurrence could be significantly reduce vaginal brachytherapy.  Will refer the patient to Radiation Oncology for vaginal brachytherapy.  We will discuss adjuvant chemotherapy with the patient.  No high-level evidence to guide treatment.  Patient voiced understanding.  All questions were answered.

## 2019-10-15 NOTE — PHYSICIAN QUERY
"PT Name: Denisha Becker  MR #: [id number]    Physician Query Form - Pathology Findings Clarification     CDS/: Freida Ruiz               Contact information:  [email or web address]  This form is a permanent document in the medical record.     Query Date: October 15, 2019      By submitting this query, we are merely seeking further clarification of documentation.  Please utilize your independent clinical judgment when addressing the question(s) below.      The medical record contains the following:     Findings Supporting Clinical Information Location in Medical Record   FINAL PATHOLOGIC DIAGNOSIS    1. LYMPH NODES (11): NO EVIDENCE OF MALIGNANCY.  2. LYMPH NODES (12): NO EVIDENCE OF MALIGNANCY.  3. UTERUS (361 g):  ENDOMETRIUM-HIGH-GRADE ENDOMETRIOID CARCINOMA (FIGO III); <50% MYOMETRIAL INVASION;  MYOMETRIUM-LEIOMYOMATA, CALCIFIED LEIOMYOMA, ADENOMYOSIS;  PARAMETRIAL AND PARACERVICAL TISSUE FREE OF MALIGNANCY;  CERVIX-SEVERE SQUAMOUS DYSPLASIA/CIN3; NO INVASIVE ENDOMETRIAL TUMOR PRESENT;  SEROSA-SUBSEROSAL LEIOMYOMATA; NO EVIDENCE OF MALIGNANCY;  LEFT FALLOPIAN TUBE-NO EVIDENCE OF MALIGNANCY.  LEFT OVARY-ATROPHIC CHANGES WITH CORPORA ALBICANTIA; NO EVIDENCE OF MALIGNANCY;  RIGHT FALLOPIAN TUBE-NO EVIDENCE OF MALIGNANCY.  RIGHT OVARY-ATROPHIC CHANGES WITH CORPORA ALBICANTIA; NO EVIDENCE OF MALIGNANCY.  Note: Representative sections of the endometrial tumor were reviewed independently by a second pathologist who  concurs with the diagnosis. Much of the tumor is polypoid and undifferentiated with other areas of more typical  endometrioid tumor. No stromal or heterologous component is identified. AE1/AE3, desmin, p53, and vimentin  were used in the evaluation of the specimen. A p16 stain of the cervix reveals strongly positive "block like" staining.  Positive and negative controls reacted appropriately.    4. OMENTUM: NO EVIDENCE OF MALIGNANCY. " "                                                                                                    Procedure(s) (LRB):  ROBOTIC HYSTERECTOMY (N/A)  SALPINGO-OOPHORECTOMY (Bilateral)  XI ROBOTIC LYMPHADENECTOMY, PELVIC (Bilateral)  XI ROBOTIC LYSIS, ADHESIONS  MINI LAPAROTOMY (N/A)  BIOPSY, omental     PRE-OPERATIVE DIAGNOSIS  Malignant neoplasm of endometrium [C54.1]   POST-OPERATIVE DIAGNOSIS   Malignant neoplasm of endometrium [C54.1] Path report 10/1                                                                                                    Op note 10/1     Please document the clinical significance of the Pathologists findings of:    FINAL PATHOLOGIC DIAGNOSIS    1. LYMPH NODES (11): NO EVIDENCE OF MALIGNANCY.  2. LYMPH NODES (12): NO EVIDENCE OF MALIGNANCY.  3. UTERUS (361 g):  ENDOMETRIUM-HIGH-GRADE ENDOMETRIOID CARCINOMA (FIGO III); <50% MYOMETRIAL INVASION;  MYOMETRIUM-LEIOMYOMATA, CALCIFIED LEIOMYOMA, ADENOMYOSIS;  PARAMETRIAL AND PARACERVICAL TISSUE FREE OF MALIGNANCY;  CERVIX-SEVERE SQUAMOUS DYSPLASIA/CIN3; NO INVASIVE ENDOMETRIAL TUMOR PRESENT;  SEROSA-SUBSEROSAL LEIOMYOMATA; NO EVIDENCE OF MALIGNANCY;  LEFT FALLOPIAN TUBE-NO EVIDENCE OF MALIGNANCY.  LEFT OVARY-ATROPHIC CHANGES WITH CORPORA ALBICANTIA; NO EVIDENCE OF MALIGNANCY;  RIGHT FALLOPIAN TUBE-NO EVIDENCE OF MALIGNANCY.  RIGHT OVARY-ATROPHIC CHANGES WITH CORPORA ALBICANTIA; NO EVIDENCE OF MALIGNANCY.  Note: Representative sections of the endometrial tumor were reviewed independently by a second pathologist who  concurs with the diagnosis. Much of the tumor is polypoid and undifferentiated with other areas of more typical  endometrioid tumor. No stromal or heterologous component is identified. AE1/AE3, desmin, p53, and vimentin  were used in the evaluation of the specimen. A p16 stain of the cervix reveals strongly positive "block like" staining.  Positive and negative controls reacted appropriately.    4. OMENTUM: NO EVIDENCE OF " MALIGNANCY.    [  X ] I agree with the Pathology Findings   [   ] I do not agree with the Pathology Findings   [   ] Other/Clarification of Findings:   [   ] Clinically Insignificant   [  ] Clinically Undetermined       Please document in your progress notes daily for the duration of treatment until resolved and include in your discharge summary.

## 2019-10-17 ENCOUNTER — PATIENT MESSAGE (OUTPATIENT)
Dept: GYNECOLOGIC ONCOLOGY | Facility: CLINIC | Age: 64
End: 2019-10-17

## 2019-10-18 ENCOUNTER — PATIENT MESSAGE (OUTPATIENT)
Dept: GYNECOLOGIC ONCOLOGY | Facility: CLINIC | Age: 64
End: 2019-10-18

## 2019-10-19 ENCOUNTER — TELEPHONE (OUTPATIENT)
Dept: GYNECOLOGIC ONCOLOGY | Facility: CLINIC | Age: 64
End: 2019-10-19

## 2019-10-19 DIAGNOSIS — C54.1 MALIGNANT NEOPLASM OF ENDOMETRIUM: Primary | ICD-10-CM

## 2019-10-19 RX ORDER — OXYCODONE HYDROCHLORIDE 5 MG/1
5 TABLET ORAL EVERY 6 HOURS PRN
Qty: 10 TABLET | Refills: 0 | Status: SHIPPED | OUTPATIENT
Start: 2019-10-19 | End: 2019-11-22 | Stop reason: SDUPTHER

## 2019-10-19 NOTE — TELEPHONE ENCOUNTER
----- Message from Ngozi Fu RN sent at 10/18/2019  5:05 PM CDT -----  Please call Ms. Becker if you have an opportunity.  She has messaged me multiple times.  I did speak with her and let her know we would like her to be able to take only Tylenol/Motrin as she is now past 2 weeks post-op.  She is very persistent. I explained that the past 2 days have also been very hectic with your schedule. She seemed to be understanding.  Thank you.

## 2019-10-25 ENCOUNTER — PATIENT MESSAGE (OUTPATIENT)
Dept: GYNECOLOGIC ONCOLOGY | Facility: CLINIC | Age: 64
End: 2019-10-25

## 2019-10-27 ENCOUNTER — PATIENT MESSAGE (OUTPATIENT)
Dept: OBSTETRICS AND GYNECOLOGY | Facility: CLINIC | Age: 64
End: 2019-10-27

## 2019-10-30 ENCOUNTER — TELEPHONE (OUTPATIENT)
Dept: GYNECOLOGIC ONCOLOGY | Facility: CLINIC | Age: 64
End: 2019-10-30

## 2019-10-31 ENCOUNTER — OFFICE VISIT (OUTPATIENT)
Dept: GYNECOLOGIC ONCOLOGY | Facility: CLINIC | Age: 64
End: 2019-10-31
Payer: MEDICAID

## 2019-10-31 ENCOUNTER — LAB VISIT (OUTPATIENT)
Dept: LAB | Facility: OTHER | Age: 64
End: 2019-10-31
Attending: OBSTETRICS & GYNECOLOGY
Payer: MEDICAID

## 2019-10-31 ENCOUNTER — TELEPHONE (OUTPATIENT)
Dept: GYNECOLOGIC ONCOLOGY | Facility: CLINIC | Age: 64
End: 2019-10-31

## 2019-10-31 VITALS
DIASTOLIC BLOOD PRESSURE: 73 MMHG | HEIGHT: 66 IN | HEART RATE: 68 BPM | BODY MASS INDEX: 43.47 KG/M2 | WEIGHT: 270.5 LBS | SYSTOLIC BLOOD PRESSURE: 166 MMHG

## 2019-10-31 DIAGNOSIS — Z87.19 HISTORY OF DIVERTICULITIS: ICD-10-CM

## 2019-10-31 DIAGNOSIS — C54.1 MALIGNANT NEOPLASM OF ENDOMETRIUM: ICD-10-CM

## 2019-10-31 DIAGNOSIS — E66.01 MORBID OBESITY WITH BMI OF 40.0-44.9, ADULT: ICD-10-CM

## 2019-10-31 DIAGNOSIS — D06.9 CIN III WITH SEVERE DYSPLASIA: ICD-10-CM

## 2019-10-31 DIAGNOSIS — C54.1 MALIGNANT NEOPLASM OF ENDOMETRIUM: Primary | ICD-10-CM

## 2019-10-31 LAB
ALBUMIN SERPL BCP-MCNC: 3.8 G/DL (ref 3.5–5.2)
ALP SERPL-CCNC: 81 U/L (ref 55–135)
ALT SERPL W/O P-5'-P-CCNC: 15 U/L (ref 10–44)
ANION GAP SERPL CALC-SCNC: 12 MMOL/L (ref 8–16)
AST SERPL-CCNC: 15 U/L (ref 10–40)
BILIRUB SERPL-MCNC: 0.5 MG/DL (ref 0.1–1)
BUN SERPL-MCNC: 11 MG/DL (ref 8–23)
CALCIUM SERPL-MCNC: 9.7 MG/DL (ref 8.7–10.5)
CHLORIDE SERPL-SCNC: 106 MMOL/L (ref 95–110)
CO2 SERPL-SCNC: 23 MMOL/L (ref 23–29)
CREAT SERPL-MCNC: 0.9 MG/DL (ref 0.5–1.4)
ERYTHROCYTE [DISTWIDTH] IN BLOOD BY AUTOMATED COUNT: 13.7 % (ref 11.5–14.5)
EST. GFR  (AFRICAN AMERICAN): >60 ML/MIN/1.73 M^2
EST. GFR  (NON AFRICAN AMERICAN): >60 ML/MIN/1.73 M^2
GLUCOSE SERPL-MCNC: 114 MG/DL (ref 70–110)
HCT VFR BLD AUTO: 41 % (ref 37–48.5)
HGB BLD-MCNC: 12.9 G/DL (ref 12–16)
IMM GRANULOCYTES # BLD AUTO: 0 K/UL (ref 0–0.04)
MCH RBC QN AUTO: 29.7 PG (ref 27–31)
MCHC RBC AUTO-ENTMCNC: 31.5 G/DL (ref 32–36)
MCV RBC AUTO: 94 FL (ref 82–98)
NEUTROPHILS # BLD AUTO: 2.2 K/UL (ref 1.8–7.7)
PLATELET # BLD AUTO: 267 K/UL (ref 150–350)
PMV BLD AUTO: 11.3 FL (ref 9.2–12.9)
POTASSIUM SERPL-SCNC: 4.4 MMOL/L (ref 3.5–5.1)
PROT SERPL-MCNC: 8.5 G/DL (ref 6–8.4)
RBC # BLD AUTO: 4.35 M/UL (ref 4–5.4)
SODIUM SERPL-SCNC: 141 MMOL/L (ref 136–145)
WBC # BLD AUTO: 5.75 K/UL (ref 3.9–12.7)

## 2019-10-31 PROCEDURE — 99215 PR OFFICE/OUTPT VISIT, EST, LEVL V, 40-54 MIN: ICD-10-PCS | Mod: S$PBB,24,, | Performed by: OBSTETRICS & GYNECOLOGY

## 2019-10-31 PROCEDURE — 99999 PR PBB SHADOW E&M-EST. PATIENT-LVL III: CPT | Mod: PBBFAC,,, | Performed by: OBSTETRICS & GYNECOLOGY

## 2019-10-31 PROCEDURE — 99999 PR PBB SHADOW E&M-EST. PATIENT-LVL III: ICD-10-PCS | Mod: PBBFAC,,, | Performed by: OBSTETRICS & GYNECOLOGY

## 2019-10-31 PROCEDURE — 99215 OFFICE O/P EST HI 40 MIN: CPT | Mod: S$PBB,24,, | Performed by: OBSTETRICS & GYNECOLOGY

## 2019-10-31 PROCEDURE — 80053 COMPREHEN METABOLIC PANEL: CPT

## 2019-10-31 PROCEDURE — 85027 COMPLETE CBC AUTOMATED: CPT

## 2019-10-31 PROCEDURE — 36415 COLL VENOUS BLD VENIPUNCTURE: CPT

## 2019-10-31 PROCEDURE — 99213 OFFICE O/P EST LOW 20 MIN: CPT | Mod: PBBFAC | Performed by: OBSTETRICS & GYNECOLOGY

## 2019-10-31 NOTE — PROGRESS NOTES
POST-OP CLINIC NOTE    SUBJECTIVE:    Denisha Becker is a 64 y.o. year old  patient who is POD#30 s/p TRH/BSO/BPLND/omental biopsy.  She is eating a regular diet without difficulty. Bowel movements are normal. Pain is controlled.  Vaginal bleeding is normal. Tolerating PO. -N/V. Normal BM. +Flatus.     REVIEW OF SYSTEMS  All systems reviewed and negative except as noted in HPI.    OBJECTIVE   Vitals:    10/31/19 0954   BP: (!) 166/73   Pulse: 68      Body mass index is 43.66 kg/m².      1. General: Well appearing, no apparent distress, alert and oriented.  2. Lymph: Neck symmetric without cervical or supraclavicular adenopathy or mass.  3. Lungs: Normal respiratory rate, no accessory muscle use.  4. Psych: Normal affect.  5. Abdomen:  non-distended, soft, non-tender, are no masses, no ascites, no hepatosplenomegaly; incision sites are C/D/I  6. Skin: Warm, dry, no rashes or lesions.   7. Extremities: Bilateral lower extremities without edema or tenderness.  8. Genitourinary               Pelvic Examination including:                a. External genitalia are normal in appearance. No lesions noted.               b. Urethral meatus is normal size, location, and appearance.               c. Urethra is negative.               d. Bladder is nontender. No masses noted.               e. Vagina has normal mucosa with physiologic discharge. No lesions noted. Vaginal cuff is intact              f. Uterus absent              g. Adnexa absent      ASSESSMENT/PLAN:    1. Malignant neoplasm of endometrium    2. ERMIAS III with severe dysplasia    3. Morbid obesity with BMI of 40.0-44.9, adult    4. History of diverticulitis         Doing well post-operatively  Operative findings again reviewed. Pathology report discussed consistent with a stage IA grade 3 endometrioid endometrial cancer.  Thirteen pelvic lymph nodes were negative.  Absent lymphovascular space invasion. Absent cervical stromal involvement.  Negative omental biopsy.   Size is not documented.  Discussed high-level evidence the supports vaginal cuff recurrence with vaginal brachytherapy.  We will the risk of distal recurrence with a grade 3 endometrioid endometrial cancer.  No high-level evidence a guide treatment.  Given the previous diagnosis of carcinosarcoma I would favor adjuvant chemotherapy with carboplatin and paclitaxel x6.  We discussed the logistics of chemotherapy.  Patient will discuss with family members.  She will return to clinic next Thursday for further discussion.  We will obtain baseline blood work today.  Patient voiced understanding.  All questions were answered. MONC class tomorrow. RONC tomorrow.    Of note her cervix showed ERMIAS III so she will need Pap smears.

## 2019-11-01 ENCOUNTER — TELEPHONE (OUTPATIENT)
Dept: GYNECOLOGIC ONCOLOGY | Facility: CLINIC | Age: 64
End: 2019-11-01

## 2019-11-01 ENCOUNTER — INITIAL CONSULT (OUTPATIENT)
Dept: RADIATION ONCOLOGY | Facility: CLINIC | Age: 64
End: 2019-11-01
Payer: MEDICAID

## 2019-11-01 VITALS
HEIGHT: 66 IN | RESPIRATION RATE: 18 BRPM | WEIGHT: 270 LBS | DIASTOLIC BLOOD PRESSURE: 66 MMHG | SYSTOLIC BLOOD PRESSURE: 144 MMHG | TEMPERATURE: 97 F | HEART RATE: 64 BPM | BODY MASS INDEX: 43.39 KG/M2

## 2019-11-01 DIAGNOSIS — C54.1 MALIGNANT NEOPLASM OF ENDOMETRIUM: Primary | ICD-10-CM

## 2019-11-01 PROCEDURE — 99204 PR OFFICE/OUTPT VISIT, NEW, LEVL IV, 45-59 MIN: ICD-10-PCS | Mod: S$PBB,,, | Performed by: RADIOLOGY

## 2019-11-01 PROCEDURE — 99999 PR PBB SHADOW E&M-EST. PATIENT-LVL III: ICD-10-PCS | Mod: PBBFAC,,, | Performed by: RADIOLOGY

## 2019-11-01 PROCEDURE — 99999 PR PBB SHADOW E&M-EST. PATIENT-LVL III: CPT | Mod: PBBFAC,,, | Performed by: RADIOLOGY

## 2019-11-01 PROCEDURE — 99204 OFFICE O/P NEW MOD 45 MIN: CPT | Mod: S$PBB,,, | Performed by: RADIOLOGY

## 2019-11-01 PROCEDURE — 99213 OFFICE O/P EST LOW 20 MIN: CPT | Mod: PBBFAC | Performed by: RADIOLOGY

## 2019-11-01 NOTE — TELEPHONE ENCOUNTER
Called to confirm arrival time for chemo class and inquire if pt desired to come earlier/later due to her being the only pt in class. Pt stated she was here for an appt this morning and didn't want to come back again and was actually going to call to reschedule. Pt has appt on 11/8 with Dr. Dumont at 9 and will be provided with chemo education before appt.at 8. Pt expressed verbal understanding regarding 8:00 arrival time In [place] [address]. [address].

## 2019-11-01 NOTE — PATIENT INSTRUCTIONS
Return for ct/sim based on chemo plans.

## 2019-11-01 NOTE — PROGRESS NOTES
REFERRING PHYSICIAN:   Scottie Bee M.D.    DIAGNOSIS:  pT1a N0 M0, FIGO stage IA, endometrioid carcinoma, grade 3    HISTORY OF PRESENT ILLNESS:   Ms. Becker is a 64-year-old female who was recently diagnosed with endometrial cancer after evaluation for postmenopausal bleeding.  A biopsy on 2019 was negative for malignancy.  Endocervical curettage and biopsy on 2019 revealed fragments of carcinosarcoma, FIGO grade 3 with extensive necrosis.  A CT of the chest, abdomen, and pelvis on 2019 was negative for metastatic disease or lymphadenopathy.  However, the uterus appeared to be lobulated.  On 2019, she underwent TAHBSO.  Pathology revealed the uterus with high-grade endometrioid carcinoma, FIGO 3, with invasion of less than 15% of the myometrium (depth not reported).  No lymphovascular invasion or cervical stromal involvement was noted.  A mentum was negative for malignancy.   right pelvic lymph nodes and  left pelvic lymph nodes were negative for involvement.  After immunohistochemistry testing, there is no loss of nuclear expression of MMR proteins with the low probability of microsatellite instability.  She is recommended to undergo adjuvant chemotherapy and vaginal cuff brachytherapy to reduce her chance of recurrence.  She is here today for discussion regarding radiation.    At present, she is healing from the surgery without any unexpected side effects.  She denies vaginal bleeding or discharge. She also denies dysuria, hematuria, rectal bleeding, or rectal pain.  Her weight is stable, without fever or night sweats.    REVIEW OF SYSTEMS:  As above.  In addition, patient denies headaches, visual problems, dizziness, chest pain, shortness of breath, cough, nausea, vomiting, diarrhea, or any new bony pains. Patient also denies easy bruising, skin rashes, or numbness or tingling.    GYN HISTORY:   Menarche age 12.  .  Menopause around the age of  51.    ECO    PAST MEDICAL HISTORY:  Past Medical History:   Diagnosis Date    Arthritis     right knee    Diverticulitis        PAST SURGICAL HISTORY:  Past Surgical History:   Procedure Laterality Date    BIOPSY OF ABDOMINAL WALL  10/1/2019    Procedure: BIOPSY, omental;  Surgeon: Scottie Dumont MD;  Location: [facility];  Service: Oncology;;     SECTION, CLASSIC      HYSTEROSCOPY WITH DILATION AND CURETTAGE OF UTERUS N/A 2019    Procedure: HYSTEROSCOPY, WITH DILATION AND CURETTAGE OF UTERUS;  Surgeon: Komal Solis MD;  Location: [facility] OR;  Service: OB/GYN;  Laterality: N/A;    LAPAROTOMY N/A 10/1/2019    Procedure: MINI LAPAROTOMY;  Surgeon: Scottie Dumont MD;  Location: [facility] OR;  Service: Oncology;  Laterality: N/A;    ROBOT-ASSISTED LAPAROSCOPIC HYSTERECTOMY N/A 10/1/2019    Procedure: ROBOTIC HYSTERECTOMY;  Surgeon: Scottie Dumont MD;  Location: [facility];  Service: Oncology;  Laterality: N/A;    ROBOT-ASSISTED LAPAROSCOPIC LYSIS OF ADHESIONS USING DA TOYA XI  10/1/2019    Procedure: XI ROBOTIC LYSIS, ADHESIONS;  Surgeon: Scottie Dumont MD;  Location: [facility] OR;  Service: Oncology;;    ROBOT-ASSISTED LAPAROSCOPIC PELVIC LYMPHADENECTOMY USING DA TOYA XI Bilateral 10/1/2019    Procedure: XI ROBOTIC LYMPHADENECTOMY, PELVIC;  Surgeon: Scottie Dumont MD;  Location: [facility];  Service: Oncology;  Laterality: Bilateral;    SALPINGOOPHORECTOMY Bilateral 10/1/2019    Procedure: SALPINGO-OOPHORECTOMY;  Surgeon: Scottie Dumont MD;  Location: [facility];  Service: Oncology;  Laterality: Bilateral;    WISDOM TOOTH EXTRACTION         ALLERGIES:   Review of patient's allergies indicates:  No Known Allergies    MEDICATIONS:  Current Outpatient Medications   Medication Sig    cetirizine (ZYRTEC) 10 MG tablet daily as needed.     ibuprofen (ADVIL,MOTRIN) 600 MG tablet Take 1 tablet (600 mg total) by mouth 3 (three) times daily.    clotrimazole (LOTRIMIN) 1 % cream GERMAN EXT AA BID FOR 7 DAYS     "fluconazole (DIFLUCAN) 150 MG Tab TK 1 T PO QD FOR 1 DAY    oxyCODONE (ROXICODONE) 5 MG immediate release tablet Take 1 tablet (5 mg total) by mouth every 6 (six) hours as needed for Pain. (Patient not taking: Reported on 11/1/2019)     No current facility-administered medications for this visit.        SOCIAL HISTORY:  Social History     Socioeconomic History    Marital status: Single     Spouse name: Not on file    Number of children: Not on file    Years of education: Not on file    Highest education level: Not on file   Occupational History    Not on file   Social Needs    Financial resource strain: Not on file    Food insecurity:     Worry: Not on file     Inability: Not on file    Transportation needs:     Medical: Not on file     Non-medical: Not on file   Tobacco Use    Smoking status: Current Every Day Smoker     Packs/day: 0.50     Types: Cigarettes, Cigars    Smokeless tobacco: Never Used   Substance and Sexual Activity    Alcohol use: Yes    Drug use: No    Sexual activity: Yes     Partners: Male     Birth control/protection: None, Post-menopausal   Lifestyle    Physical activity:     Days per week: Not on file     Minutes per session: Not on file    Stress: Not on file   Relationships    Social connections:     Talks on phone: Not on file     Gets together: Not on file     Attends [employer] service: Not on file     Active member of club or organization: Not on file     Attends meetings of clubs or organizations: Not on file     Relationship status: Not on file   Other Topics Concern    Not on file   Social History Narrative    Not on file       FAMILY HISTORY:  Family History   Problem Relation Age of Onset    Breast cancer Neg Hx     Cancer Neg Hx     Ovarian cancer Neg Hx          PHYSICAL EXAMINATION:  Vitals:    11/01/19 0908   BP: (!) 144/66   Pulse: 64   Resp: 18   Temp: 96.9 °F (36.1 °C)   TempSrc: Oral   Weight: 122.5 kg (270 lb)   Height: 5' 6" (1.676 m)   Body mass index " is 43.58 kg/m².  GENERAL: Patient is alert and oriented, in no acute distress.  HEENT:Extraocular muscles are intact.  Oropharynx is clear without lesions.  There is no cervical or supraclavicular lymphadenopathy palpated.  No thyromegaly noted.  HEART: Regular rate and rhythm.  LUNGS: Clear to auscultation bilaterally.  ABDOMEN:  Abdomen is obese.  Trocar sites healing.  Soft, nontender, nondistended, without hepatosplenomegaly.  Normoactive bowel sounds.  PELVIC EXAM:  A speculum examination reveals well-healing vaginal cuff with no suspicious lesions.  EXTREMITIES: No clubbing, cyanosis, or edema.  NEUROLOGICAL: Cranial nerve II through XII grossly intact.  Sensation is intact.  Strength is 5 out of 5 in the upper and lower extremities bilaterally.     ASSESSMENT:   This is a 64-year-old female with FIGO stage IA, grade 3, endometrioid carcinoma who underwent TAHBSO on October 1, 2019 with invasion of less than 50% of the myometrium.    PLAN:   After review of the pathology and images of the radiological studies, Ms. Becker is noted to have grade 3 endometrioid adenocarcinoma.  Her biopsy revealed features of carcinosarcoma.  She is noted to have invasion into the myometrium, less than 50%.  To reduce her chance of vaginal cuff recurrence, I agree with vaginal cuff brachytherapy.  She is currently planned for adjuvant chemotherapy with carboplatin and Taxol for 6 cycles.  I plan to deliver 2500 cGy in 5 fractions to the vaginal cuff and upper 2/3 of the vagina.    The risks, benefits, and side effects of radiation were explained in detail to the patient.  All questions were answered and informed consent was signed.  I plan to see the patient back for radiation planning CT in 2-3 weeks in anticipation of start of radiation soon after.    Psychosocial Distress screening score of Distress Score: 0 noted and reviewed. No intervention indicated.    I spent approximately 60 minutes reviewing the available records and  evaluating the patient, out of which over 50% of the time was spent face to face with the patient in counseling and coordinating this patient's care.

## 2019-11-01 NOTE — LETTER
November 1, 2019      Scottie Dumont MD  [address]  [address]  [facility] [address]           [facility]  [address]  [facility] [address]  Phone: [phone]          Patient: Denisha Becker   MR Number: [id number]   YOB: 1955   Date of Visit: 11/1/2019       Dear Dr. Scottie Dumont:    Thank you for referring Denisha Becker to me for evaluation. Attached you will find relevant portions of my assessment and plan of care.    If you have questions, please do not hesitate to call me. I look forward to following Denisha Becker along with you.    Sincerely,    Digna Gonzalez MD    Enclosure  CC:  No Recipients    If you would like to receive this communication electronically, please contact [email or web address] or [phone] to request more information on [facility] access.    For providers and/or their staff who would like to refer a patient to [facility], please contact us through our one-stop-shop provider referral line, [facility], at [phone].    If you feel you have received this communication in error or would no longer like to receive these types of communications, please e-mail [email or web address]

## 2019-11-06 ENCOUNTER — PATIENT MESSAGE (OUTPATIENT)
Dept: GYNECOLOGIC ONCOLOGY | Facility: CLINIC | Age: 64
End: 2019-11-06

## 2019-11-06 RX ORDER — HEPARIN 100 UNIT/ML
500 SYRINGE INTRAVENOUS
Status: CANCELLED | OUTPATIENT
Start: 2019-11-15

## 2019-11-06 RX ORDER — SODIUM CHLORIDE 0.9 % (FLUSH) 0.9 %
10 SYRINGE (ML) INJECTION
Status: CANCELLED | OUTPATIENT
Start: 2019-11-15

## 2019-11-06 RX ORDER — EPINEPHRINE 0.3 MG/.3ML
0.3 INJECTION SUBCUTANEOUS ONCE AS NEEDED
Status: CANCELLED | OUTPATIENT
Start: 2019-11-15

## 2019-11-06 RX ORDER — DIPHENHYDRAMINE HYDROCHLORIDE 50 MG/ML
50 INJECTION INTRAMUSCULAR; INTRAVENOUS ONCE AS NEEDED
Status: CANCELLED | OUTPATIENT
Start: 2019-11-15

## 2019-11-06 RX ORDER — FAMOTIDINE 10 MG/ML
20 INJECTION INTRAVENOUS
Status: CANCELLED | OUTPATIENT
Start: 2019-11-15

## 2019-11-08 ENCOUNTER — DOCUMENTATION ONLY (OUTPATIENT)
Dept: GYNECOLOGIC ONCOLOGY | Facility: CLINIC | Age: 64
End: 2019-11-08

## 2019-11-08 ENCOUNTER — OFFICE VISIT (OUTPATIENT)
Dept: GYNECOLOGIC ONCOLOGY | Facility: CLINIC | Age: 64
End: 2019-11-08
Payer: MEDICAID

## 2019-11-08 VITALS
DIASTOLIC BLOOD PRESSURE: 69 MMHG | HEART RATE: 64 BPM | SYSTOLIC BLOOD PRESSURE: 144 MMHG | WEIGHT: 273.38 LBS | BODY MASS INDEX: 43.93 KG/M2 | HEIGHT: 66 IN

## 2019-11-08 DIAGNOSIS — D06.9 CIN III WITH SEVERE DYSPLASIA: ICD-10-CM

## 2019-11-08 DIAGNOSIS — C54.1 MALIGNANT NEOPLASM OF ENDOMETRIUM: Primary | ICD-10-CM

## 2019-11-08 DIAGNOSIS — E66.01 MORBID OBESITY WITH BMI OF 40.0-44.9, ADULT: ICD-10-CM

## 2019-11-08 PROCEDURE — 99999 PR PBB SHADOW E&M-EST. PATIENT-LVL III: ICD-10-PCS | Mod: PBBFAC,,, | Performed by: OBSTETRICS & GYNECOLOGY

## 2019-11-08 PROCEDURE — 99213 OFFICE O/P EST LOW 20 MIN: CPT | Mod: PBBFAC | Performed by: OBSTETRICS & GYNECOLOGY

## 2019-11-08 PROCEDURE — 99214 OFFICE O/P EST MOD 30 MIN: CPT | Mod: S$PBB,24,, | Performed by: OBSTETRICS & GYNECOLOGY

## 2019-11-08 PROCEDURE — 99214 PR OFFICE/OUTPT VISIT, EST, LEVL IV, 30-39 MIN: ICD-10-PCS | Mod: S$PBB,24,, | Performed by: OBSTETRICS & GYNECOLOGY

## 2019-11-08 PROCEDURE — 99999 PR PBB SHADOW E&M-EST. PATIENT-LVL III: CPT | Mod: PBBFAC,,, | Performed by: OBSTETRICS & GYNECOLOGY

## 2019-11-08 RX ORDER — PROCHLORPERAZINE MALEATE 10 MG
10 TABLET ORAL EVERY 6 HOURS PRN
Qty: 30 TABLET | Refills: 1 | Status: SHIPPED | OUTPATIENT
Start: 2019-11-08 | End: 2020-11-07

## 2019-11-08 NOTE — PROGRESS NOTES
REFERRING PROVIDER  No ref. provider found     REASON FOR CONSULT  Denisha Becker  is a 64 y.o.  woman who presents for evaluation of chemotherapy for a stage 1A grade 3 endometrioid EC    HISTORY OF PRESENT ILLNESS    Please refer below for the patient's tumor history.  The interval history is as follows: Tolerating PO. -N/V. Ambulating. Voiding. -VB.       Malignant neoplasm of endometrium    9/24/2019 Imaging Significant Findings     CT C/A/P Negative for metastases      9/25/2019 Tumor Markers      WNL      10/1/2019 Cancer Staged     Stage IA grade 3 endometrioid endometrial cancer status post total robotic hysterectomy, bilateral salpingo-oophorectomy, bilateral pelvic lymph node dissection, and omental biopsy.      Grade 3 endometrioid endometrial cancer.  Size of the cancer is unclear.  Myometrial invasion was 0.2/1.4 cm.  LVSI was absent.  Twenty-three pelvic lymph nodes were negative.  Omental biopsy was negative.     Cervix showed ERMIAS 3.      11/15/2019 -  Chemotherapy     Treatment Summary   Plan Name: OP GYN PACLITAXEL CARBOPLATIN (AUC 6) Q3W  Treatment Goal: Curative  Status: Active  Start Date: 11/15/2019 (Planned)  End Date: 2/28/2020 (Planned)  Provider: Scottie Dumont MD  Chemotherapy: CARBOplatin (PARAPLATIN) 890 mg in sodium chloride 0.9% 250 mL chemo infusion, 890 mg (100 % of original dose 892.2 mg), Intravenous, Clinic/HOD 1 time, 0 of 6 cycles  Dose modification:   (original dose 892.2 mg, Cycle 1)  PACLitaxel (TAXOL) 175 mg/m2 = 420 mg in sodium chloride 0.9% 500 mL chemo infusion, 175 mg/m2 = 420 mg, Intravenous, Clinic/HOD 1 time, 0 of 6 cycles          REVIEW OF SYSTEMS  All systems reviewed and negative except as noted in HPI.    OBJECTIVE   Vitals:    11/08/19 0921   BP: (!) 144/69   Pulse: 64      Body mass index is 44.12 kg/m².      1. General: Well appearing, no apparent distress, alert and oriented.  2. Lymph: Neck symmetric without cervical or supraclavicular adenopathy or  mass.  3. Lungs: Normal respiratory rate, no accessory muscle use.  4. Cardiac: No JVD distention  5. Psych: Normal affect.  6. Abdomen:  No noticeable bulging  7. Skin: Warm, dry, no rashes or lesions.   8. Extremities: Bilateral lower extremities without edema or tenderness.    ECOG status: 1    LABORATORY DATA  Lab data reviewed.    RADIOLOGICAL DATA  Radiology data reviewed.    ASSESSMENT / PLAN     1. Malignant neoplasm of endometrium    2. ERMIAS III with severe dysplasia    3. Morbid obesity with BMI of 40.0-44.9, adult       Adjuvant CT with Carboplatin/Paclitaxel x6    PATIENT EDUCATION  Ready to learn, no apparent learning barriers were identified; learning preferences include listening. Explained diagnosis and treatment plan; patient expressed understanding of the content.    ADMINISTRATIVE BILLING  This consultation lasted 15 minutes and greater than 50% of was spent in counseling.       Scottie Dumont

## 2019-11-11 ENCOUNTER — TELEPHONE (OUTPATIENT)
Dept: GYNECOLOGIC ONCOLOGY | Facility: CLINIC | Age: 64
End: 2019-11-11

## 2019-11-11 ENCOUNTER — PATIENT MESSAGE (OUTPATIENT)
Dept: GYNECOLOGIC ONCOLOGY | Facility: CLINIC | Age: 64
End: 2019-11-11

## 2019-11-11 NOTE — TELEPHONE ENCOUNTER
RN spoke with patient earlier in the day. Chemotherapy infusion times offered tomorrow and Wednesday 9am. Patient prefers Friday start for chemo. Unfortunately as of now, the infusion department does not have requested availability. Patient made aware. Patient upset due to conflicts with work schedule. Family member states that she would be homeless if she is unable to work.  Patient given next Tuesday/Wednesday 9am chemo start as options. Patient states she will call back with decision. MD made aware.

## 2019-11-12 ENCOUNTER — TELEPHONE (OUTPATIENT)
Dept: GYNECOLOGIC ONCOLOGY | Facility: CLINIC | Age: 64
End: 2019-11-12

## 2019-11-12 DIAGNOSIS — R11.0 CHEMOTHERAPY-INDUCED NAUSEA: ICD-10-CM

## 2019-11-12 DIAGNOSIS — T45.1X5A CHEMOTHERAPY-INDUCED NAUSEA: ICD-10-CM

## 2019-11-12 DIAGNOSIS — C54.1 MALIGNANT NEOPLASM OF ENDOMETRIUM: Primary | ICD-10-CM

## 2019-11-12 RX ORDER — ONDANSETRON 8 MG/1
8 TABLET, ORALLY DISINTEGRATING ORAL EVERY 8 HOURS PRN
Qty: 30 TABLET | Refills: 3 | Status: SHIPPED | OUTPATIENT
Start: 2019-11-12 | End: 2019-11-19

## 2019-11-12 NOTE — PROGRESS NOTES
Met one on one with unaccompanied pt to review chemo information. Pt provided chemocare handouts. Common side effects reviewed for regimen and general chemo side effects of chemo reviewed. Pt provided with handout to watch chemo video online. Pt provided office contact information to reach out for any questions or concerns. Pt expressed verbal understanding and is having a meeting with her job on Monday to discuss job flexibility during treatment.

## 2019-11-13 ENCOUNTER — PATIENT MESSAGE (OUTPATIENT)
Dept: GYNECOLOGIC ONCOLOGY | Facility: CLINIC | Age: 64
End: 2019-11-13

## 2019-11-16 ENCOUNTER — PATIENT MESSAGE (OUTPATIENT)
Dept: GYNECOLOGIC ONCOLOGY | Facility: CLINIC | Age: 64
End: 2019-11-16

## 2019-11-18 ENCOUNTER — PATIENT MESSAGE (OUTPATIENT)
Dept: GYNECOLOGIC ONCOLOGY | Facility: CLINIC | Age: 64
End: 2019-11-18

## 2019-11-18 DIAGNOSIS — C54.1 MALIGNANT NEOPLASM OF ENDOMETRIUM: Primary | ICD-10-CM

## 2019-11-19 ENCOUNTER — LAB VISIT (OUTPATIENT)
Dept: LAB | Facility: OTHER | Age: 64
End: 2019-11-19
Payer: MEDICAID

## 2019-11-19 DIAGNOSIS — C54.1 MALIGNANT NEOPLASM OF ENDOMETRIUM: ICD-10-CM

## 2019-11-19 LAB
ALBUMIN SERPL BCP-MCNC: 3.8 G/DL (ref 3.5–5.2)
ALP SERPL-CCNC: 72 U/L (ref 55–135)
ALT SERPL W/O P-5'-P-CCNC: 20 U/L (ref 10–44)
ANION GAP SERPL CALC-SCNC: 9 MMOL/L (ref 8–16)
AST SERPL-CCNC: 20 U/L (ref 10–40)
BASOPHILS # BLD AUTO: 0.02 K/UL (ref 0–0.2)
BASOPHILS NFR BLD: 0.4 % (ref 0–1.9)
BILIRUB SERPL-MCNC: 0.6 MG/DL (ref 0.1–1)
BUN SERPL-MCNC: 8 MG/DL (ref 8–23)
CALCIUM SERPL-MCNC: 9.4 MG/DL (ref 8.7–10.5)
CHLORIDE SERPL-SCNC: 110 MMOL/L (ref 95–110)
CO2 SERPL-SCNC: 23 MMOL/L (ref 23–29)
CREAT SERPL-MCNC: 0.8 MG/DL (ref 0.5–1.4)
DIFFERENTIAL METHOD: NORMAL
EOSINOPHIL # BLD AUTO: 0.1 K/UL (ref 0–0.5)
EOSINOPHIL NFR BLD: 0.9 % (ref 0–8)
ERYTHROCYTE [DISTWIDTH] IN BLOOD BY AUTOMATED COUNT: 14.4 % (ref 11.5–14.5)
EST. GFR  (AFRICAN AMERICAN): >60 ML/MIN/1.73 M^2
EST. GFR  (NON AFRICAN AMERICAN): >60 ML/MIN/1.73 M^2
GLUCOSE SERPL-MCNC: 94 MG/DL (ref 70–110)
HCT VFR BLD AUTO: 39.7 % (ref 37–48.5)
HGB BLD-MCNC: 12.7 G/DL (ref 12–16)
IMM GRANULOCYTES # BLD AUTO: 0.02 K/UL (ref 0–0.04)
IMM GRANULOCYTES NFR BLD AUTO: 0.4 % (ref 0–0.5)
LYMPHOCYTES # BLD AUTO: 2.5 K/UL (ref 1–4.8)
LYMPHOCYTES NFR BLD: 44.3 % (ref 18–48)
MCH RBC QN AUTO: 30.5 PG (ref 27–31)
MCHC RBC AUTO-ENTMCNC: 32 G/DL (ref 32–36)
MCV RBC AUTO: 95 FL (ref 82–98)
MONOCYTES # BLD AUTO: 0.6 K/UL (ref 0.3–1)
MONOCYTES NFR BLD: 10.5 % (ref 4–15)
NEUTROPHILS # BLD AUTO: 2.4 K/UL (ref 1.8–7.7)
NEUTROPHILS NFR BLD: 43.5 % (ref 38–73)
NRBC BLD-RTO: 0 /100 WBC
PLATELET # BLD AUTO: 254 K/UL (ref 150–350)
PMV BLD AUTO: 10.9 FL (ref 9.2–12.9)
POTASSIUM SERPL-SCNC: 4.5 MMOL/L (ref 3.5–5.1)
PROT SERPL-MCNC: 7.8 G/DL (ref 6–8.4)
RBC # BLD AUTO: 4.16 M/UL (ref 4–5.4)
SODIUM SERPL-SCNC: 142 MMOL/L (ref 136–145)
WBC # BLD AUTO: 5.53 K/UL (ref 3.9–12.7)

## 2019-11-19 PROCEDURE — 85025 COMPLETE CBC W/AUTO DIFF WBC: CPT

## 2019-11-19 PROCEDURE — 36415 COLL VENOUS BLD VENIPUNCTURE: CPT

## 2019-11-19 PROCEDURE — 80053 COMPREHEN METABOLIC PANEL: CPT

## 2019-11-20 ENCOUNTER — DOCUMENTATION ONLY (OUTPATIENT)
Dept: GYNECOLOGIC ONCOLOGY | Facility: CLINIC | Age: 64
End: 2019-11-20

## 2019-11-20 ENCOUNTER — INFUSION (OUTPATIENT)
Dept: INFUSION THERAPY | Facility: OTHER | Age: 64
End: 2019-11-20
Attending: OBSTETRICS & GYNECOLOGY
Payer: MEDICAID

## 2019-11-20 VITALS
HEIGHT: 66 IN | DIASTOLIC BLOOD PRESSURE: 65 MMHG | SYSTOLIC BLOOD PRESSURE: 145 MMHG | WEIGHT: 272.5 LBS | HEART RATE: 57 BPM | BODY MASS INDEX: 43.79 KG/M2 | RESPIRATION RATE: 18 BRPM | OXYGEN SATURATION: 96 % | TEMPERATURE: 98 F

## 2019-11-20 DIAGNOSIS — C54.1 MALIGNANT NEOPLASM OF ENDOMETRIUM: Primary | ICD-10-CM

## 2019-11-20 PROCEDURE — 96413 CHEMO IV INFUSION 1 HR: CPT

## 2019-11-20 PROCEDURE — 96375 TX/PRO/DX INJ NEW DRUG ADDON: CPT

## 2019-11-20 PROCEDURE — 96417 CHEMO IV INFUS EACH ADDL SEQ: CPT

## 2019-11-20 PROCEDURE — 63600175 PHARM REV CODE 636 W HCPCS: Performed by: OBSTETRICS & GYNECOLOGY

## 2019-11-20 PROCEDURE — 96361 HYDRATE IV INFUSION ADD-ON: CPT

## 2019-11-20 PROCEDURE — 96367 TX/PROPH/DG ADDL SEQ IV INF: CPT

## 2019-11-20 PROCEDURE — 96415 CHEMO IV INFUSION ADDL HR: CPT

## 2019-11-20 PROCEDURE — 25000003 PHARM REV CODE 250: Performed by: OBSTETRICS & GYNECOLOGY

## 2019-11-20 PROCEDURE — S0028 INJECTION, FAMOTIDINE, 20 MG: HCPCS | Performed by: OBSTETRICS & GYNECOLOGY

## 2019-11-20 RX ORDER — SODIUM CHLORIDE 0.9 % (FLUSH) 0.9 %
10 SYRINGE (ML) INJECTION
Status: DISCONTINUED | OUTPATIENT
Start: 2019-11-20 | End: 2019-11-20 | Stop reason: HOSPADM

## 2019-11-20 RX ORDER — HEPARIN 100 UNIT/ML
500 SYRINGE INTRAVENOUS
Status: DISCONTINUED | OUTPATIENT
Start: 2019-11-20 | End: 2019-11-20 | Stop reason: HOSPADM

## 2019-11-20 RX ORDER — FAMOTIDINE 10 MG/ML
20 INJECTION INTRAVENOUS
Status: COMPLETED | OUTPATIENT
Start: 2019-11-20 | End: 2019-11-20

## 2019-11-20 RX ORDER — DIPHENHYDRAMINE HYDROCHLORIDE 50 MG/ML
50 INJECTION INTRAMUSCULAR; INTRAVENOUS ONCE AS NEEDED
Status: DISCONTINUED | OUTPATIENT
Start: 2019-11-20 | End: 2019-11-20 | Stop reason: HOSPADM

## 2019-11-20 RX ORDER — EPINEPHRINE 0.3 MG/.3ML
0.3 INJECTION SUBCUTANEOUS ONCE AS NEEDED
Status: DISCONTINUED | OUTPATIENT
Start: 2019-11-20 | End: 2019-11-20 | Stop reason: HOSPADM

## 2019-11-20 RX ADMIN — SODIUM CHLORIDE 500 ML: 0.9 INJECTION, SOLUTION INTRAVENOUS at 02:11

## 2019-11-20 RX ADMIN — PALONOSETRON HYDROCHLORIDE: 0.25 INJECTION, SOLUTION INTRAVENOUS at 09:11

## 2019-11-20 RX ADMIN — FAMOTIDINE 20 MG: 10 INJECTION, SOLUTION INTRAVENOUS at 09:11

## 2019-11-20 RX ADMIN — CARBOPLATIN 900 MG: 10 INJECTION, SOLUTION INTRAVENOUS at 01:11

## 2019-11-20 RX ADMIN — DIPHENHYDRAMINE HYDROCHLORIDE 50 MG: 50 INJECTION, SOLUTION INTRAMUSCULAR; INTRAVENOUS at 09:11

## 2019-11-20 RX ADMIN — PACLITAXEL 420 MG: 6 INJECTION, SOLUTION INTRAVENOUS at 10:11

## 2019-11-20 NOTE — PROGRESS NOTES
Contacted pt. Per MD request to discuss financial resouces. Pt. Currently in infusion center @ [facility] getting infusion, request that I call her in the am to discuss. Will follow up in the am.

## 2019-11-22 ENCOUNTER — HOSPITAL ENCOUNTER (EMERGENCY)
Facility: OTHER | Age: 64
Discharge: HOME | End: 2019-11-22
Attending: EMERGENCY MEDICINE
Payer: MEDICAID

## 2019-11-22 ENCOUNTER — PATIENT MESSAGE (OUTPATIENT)
Dept: GYNECOLOGIC ONCOLOGY | Facility: CLINIC | Age: 64
End: 2019-11-22

## 2019-11-22 ENCOUNTER — DOCUMENTATION ONLY (OUTPATIENT)
Dept: GYNECOLOGIC ONCOLOGY | Facility: CLINIC | Age: 64
End: 2019-11-22

## 2019-11-22 ENCOUNTER — TELEPHONE (OUTPATIENT)
Dept: GYNECOLOGIC ONCOLOGY | Facility: CLINIC | Age: 64
End: 2019-11-22

## 2019-11-22 VITALS
DIASTOLIC BLOOD PRESSURE: 81 MMHG | TEMPERATURE: 99 F | BODY MASS INDEX: 43.55 KG/M2 | SYSTOLIC BLOOD PRESSURE: 178 MMHG | OXYGEN SATURATION: 100 % | RESPIRATION RATE: 17 BRPM | HEIGHT: 66 IN | WEIGHT: 271 LBS | HEART RATE: 58 BPM

## 2019-11-22 DIAGNOSIS — C54.1 MALIGNANT NEOPLASM OF ENDOMETRIUM: ICD-10-CM

## 2019-11-22 DIAGNOSIS — R52 BODY ACHES: Primary | ICD-10-CM

## 2019-11-22 LAB
ALBUMIN SERPL BCP-MCNC: 3.7 G/DL (ref 3.5–5.2)
ALP SERPL-CCNC: 85 U/L (ref 55–135)
ALT SERPL W/O P-5'-P-CCNC: 23 U/L (ref 10–44)
ANION GAP SERPL CALC-SCNC: 11 MMOL/L (ref 8–16)
AST SERPL-CCNC: 22 U/L (ref 10–40)
BASOPHILS # BLD AUTO: 0.01 K/UL (ref 0–0.2)
BASOPHILS NFR BLD: 0.2 % (ref 0–1.9)
BILIRUB SERPL-MCNC: 0.6 MG/DL (ref 0.1–1)
BUN SERPL-MCNC: 13 MG/DL (ref 8–23)
CALCIUM SERPL-MCNC: 8.9 MG/DL (ref 8.7–10.5)
CHLORIDE SERPL-SCNC: 109 MMOL/L (ref 95–110)
CO2 SERPL-SCNC: 23 MMOL/L (ref 23–29)
CREAT SERPL-MCNC: 1 MG/DL (ref 0.5–1.4)
DIFFERENTIAL METHOD: ABNORMAL
EOSINOPHIL # BLD AUTO: 0 K/UL (ref 0–0.5)
EOSINOPHIL NFR BLD: 0.5 % (ref 0–8)
ERYTHROCYTE [DISTWIDTH] IN BLOOD BY AUTOMATED COUNT: 14.1 % (ref 11.5–14.5)
EST. GFR  (AFRICAN AMERICAN): >60 ML/MIN/1.73 M^2
EST. GFR  (NON AFRICAN AMERICAN): >60 ML/MIN/1.73 M^2
GLUCOSE SERPL-MCNC: 104 MG/DL (ref 70–110)
HCT VFR BLD AUTO: 40.1 % (ref 37–48.5)
HGB BLD-MCNC: 12.7 G/DL (ref 12–16)
IMM GRANULOCYTES # BLD AUTO: 0.01 K/UL (ref 0–0.04)
IMM GRANULOCYTES NFR BLD AUTO: 0.2 % (ref 0–0.5)
INFLUENZA A, MOLECULAR: NEGATIVE
INFLUENZA B, MOLECULAR: NEGATIVE
INR PPP: 1 (ref 0.8–1.2)
LYMPHOCYTES # BLD AUTO: 3.1 K/UL (ref 1–4.8)
LYMPHOCYTES NFR BLD: 47.1 % (ref 18–48)
MCH RBC QN AUTO: 30.2 PG (ref 27–31)
MCHC RBC AUTO-ENTMCNC: 31.7 G/DL (ref 32–36)
MCV RBC AUTO: 96 FL (ref 82–98)
MONOCYTES # BLD AUTO: 0.3 K/UL (ref 0.3–1)
MONOCYTES NFR BLD: 4.5 % (ref 4–15)
NEUTROPHILS # BLD AUTO: 3.1 K/UL (ref 1.8–7.7)
NEUTROPHILS NFR BLD: 47.5 % (ref 38–73)
NRBC BLD-RTO: 0 /100 WBC
PLATELET # BLD AUTO: 236 K/UL (ref 150–350)
PMV BLD AUTO: 10.9 FL (ref 9.2–12.9)
POTASSIUM SERPL-SCNC: 3.5 MMOL/L (ref 3.5–5.1)
PROT SERPL-MCNC: 7.7 G/DL (ref 6–8.4)
PROTHROMBIN TIME: 11 SEC (ref 9–12.5)
RBC # BLD AUTO: 4.2 M/UL (ref 4–5.4)
SODIUM SERPL-SCNC: 143 MMOL/L (ref 136–145)
SPECIMEN SOURCE: NORMAL
WBC # BLD AUTO: 6.47 K/UL (ref 3.9–12.7)

## 2019-11-22 PROCEDURE — 99284 EMERGENCY DEPT VISIT MOD MDM: CPT | Mod: 25

## 2019-11-22 PROCEDURE — 85025 COMPLETE CBC W/AUTO DIFF WBC: CPT

## 2019-11-22 PROCEDURE — 63600175 PHARM REV CODE 636 W HCPCS: Performed by: EMERGENCY MEDICINE

## 2019-11-22 PROCEDURE — 87502 INFLUENZA DNA AMP PROBE: CPT

## 2019-11-22 PROCEDURE — 85610 PROTHROMBIN TIME: CPT

## 2019-11-22 PROCEDURE — 96374 THER/PROPH/DIAG INJ IV PUSH: CPT

## 2019-11-22 PROCEDURE — 25000003 PHARM REV CODE 250: Performed by: EMERGENCY MEDICINE

## 2019-11-22 PROCEDURE — 80053 COMPREHEN METABOLIC PANEL: CPT

## 2019-11-22 RX ORDER — OXYCODONE HYDROCHLORIDE 5 MG/1
5 TABLET ORAL EVERY 6 HOURS PRN
Qty: 12 TABLET | Refills: 0 | Status: SHIPPED | OUTPATIENT
Start: 2019-11-22

## 2019-11-22 RX ORDER — FENTANYL CITRATE 50 UG/ML
50 INJECTION, SOLUTION INTRAMUSCULAR; INTRAVENOUS
Status: COMPLETED | OUTPATIENT
Start: 2019-11-22 | End: 2019-11-22

## 2019-11-22 RX ORDER — OXYCODONE HYDROCHLORIDE 5 MG/1
5 TABLET ORAL
Status: COMPLETED | OUTPATIENT
Start: 2019-11-22 | End: 2019-11-22

## 2019-11-22 RX ADMIN — FENTANYL CITRATE 50 MCG: 50 INJECTION, SOLUTION INTRAMUSCULAR; INTRAVENOUS at 05:11

## 2019-11-22 RX ADMIN — OXYCODONE HYDROCHLORIDE 5 MG: 5 TABLET ORAL at 06:11

## 2019-11-22 NOTE — PROGRESS NOTES
Called patient again this am to discuss financial resources. Pt. states that she is not feeling well and requests that I call back on Monday. Encouraged pt. To call MD if she does not start to feel better. Pt. Expressed understanding. Will follow.

## 2019-11-22 NOTE — ED PROVIDER NOTES
Encounter Date: 2019    SCRIBE #1 NOTE: Leeroy GIBBONS, am scribing for, and in the presence of, Dr. Tam.       History     Chief Complaint   Patient presents with    Generalized Body Aches     pt reports generalized body aches with onset yesterday, reports 1 round of chemo yesterday, reports taking OTC medication with no relief of symptoms     Time seen by provider: 4:39 AM    This is a 64 y.o. Female, with a hx of endometrial cancer on chemotherapy, who presents with complaint of generalized body aches. Pt states her first session of chemotherapy was yesterday and she felt fine after. She states at 10 PM, the pain started in her knee, but she attributed the pain to arthritis, and since then has spread to his whole body. She has taken OTC pain medicine, with no relief. She denies diarrhea, fever, or vomiting.    The history is provided by the patient and medical records.     Review of patient's allergies indicates:  No Known Allergies  Past Medical History:   Diagnosis Date    Arthritis     right knee    Cancer     Diverticulitis      Past Surgical History:   Procedure Laterality Date    BIOPSY OF ABDOMINAL WALL  10/1/2019    Procedure: BIOPSY, omental;  Surgeon: Scottie Dumont MD;  Location: [facility];  Service: Oncology;;     SECTION, CLASSIC  1989    HYSTEROSCOPY WITH DILATION AND CURETTAGE OF UTERUS N/A 2019    Procedure: HYSTEROSCOPY, WITH DILATION AND CURETTAGE OF UTERUS;  Surgeon: Komal Solis MD;  Location: [facility] OR;  Service: OB/GYN;  Laterality: N/A;    LAPAROTOMY N/A 10/1/2019    Procedure: MINI LAPAROTOMY;  Surgeon: Scottie Dumont MD;  Location: [facility] OR;  Service: Oncology;  Laterality: N/A;    ROBOT-ASSISTED LAPAROSCOPIC HYSTERECTOMY N/A 10/1/2019    Procedure: ROBOTIC HYSTERECTOMY;  Surgeon: Scottie Dumont MD;  Location: [facility] OR;  Service: Oncology;  Laterality: N/A;    ROBOT-ASSISTED LAPAROSCOPIC LYSIS OF ADHESIONS USING DA TOYA XI  10/1/2019    Procedure:  XI ROBOTIC LYSIS, ADHESIONS;  Surgeon: Scottie Dumont MD;  Location: [facility];  Service: Oncology;;    ROBOT-ASSISTED LAPAROSCOPIC PELVIC LYMPHADENECTOMY USING DA TOYA XI Bilateral 10/1/2019    Procedure: XI ROBOTIC LYMPHADENECTOMY, PELVIC;  Surgeon: Scottie Dumont MD;  Location: [facility];  Service: Oncology;  Laterality: Bilateral;    SALPINGOOPHORECTOMY Bilateral 10/1/2019    Procedure: SALPINGO-OOPHORECTOMY;  Surgeon: Scottie Dumont MD;  Location: [facility];  Service: Oncology;  Laterality: Bilateral;    WISDOM TOOTH EXTRACTION       Family History   Problem Relation Age of Onset    Lung cancer Maternal Uncle     Breast cancer Neg Hx     Cancer Neg Hx     Ovarian cancer Neg Hx      Social History     Tobacco Use    Smoking status: Former Smoker     Packs/day: 0.50     Types: Cigarettes, Cigars    Smokeless tobacco: Never Used   Substance Use Topics    Alcohol use: Yes    Drug use: No     Review of Systems   Constitutional: Negative for chills and fever.   HENT: Negative for congestion, rhinorrhea and sore throat.    Eyes: Negative for visual disturbance.   Respiratory: Negative for cough and shortness of breath.    Cardiovascular: Negative for chest pain.   Gastrointestinal: Negative for abdominal pain, diarrhea, nausea and vomiting.   Genitourinary: Negative for dysuria.   Musculoskeletal: Positive for arthralgias and myalgias. Negative for back pain.   Skin: Negative for rash.   Neurological: Negative for dizziness, weakness and light-headedness.   Psychiatric/Behavioral: Negative for confusion.       Physical Exam     Initial Vitals [11/22/19 0241]   BP Pulse Resp Temp SpO2   (!) 191/83 67 18 98.8 °F (37.1 °C) 97 %      MAP       --         Physical Exam    Nursing note and vitals reviewed.  Constitutional: She appears well-developed and well-nourished. She is not diaphoretic. No distress.   HENT:   Head: Normocephalic and atraumatic.   Eyes: Conjunctivae and EOM are normal. Pupils are equal, round, and  reactive to light. No scleral icterus.   Neck: Normal range of motion. Neck supple.   Cardiovascular: Normal rate, regular rhythm and normal heart sounds.   No murmur heard.  Pulmonary/Chest: Breath sounds normal. No respiratory distress. She has no wheezes. She has no rhonchi. She has no rales.   Abdominal: Soft. Bowel sounds are normal. She exhibits no distension. There is no tenderness. There is no rebound and no guarding.   Musculoskeletal: Normal range of motion. She exhibits no edema or tenderness.   No focal tenderness. Full ROM.   Neurological: She is alert and oriented to person, place, and time.   Skin: Skin is warm and dry.         ED Course   Procedures  Labs Reviewed   INFLUENZA A & B BY MOLECULAR          Imaging Results    None          Medical Decision Making:   History:   Old Medical Records: I decided to obtain old medical records.  Initial Assessment:       64-year-old female with history of endometrial cancer presents with onset of generalized body aches today.  She had her 1st session of chemo yesterday, and has had no significant side effects until she started to feel some knee pain that she thought arthritis, but then pains became generalized.  No associated fevers, cough, nausea, diarrhea, or other symptoms. She is s/p hysterectomy and is followed by gyn Oncology here.  She is otherwise at baseline prior to chemo session.  She tried OTC meds for body aches with no relief.  Concern for chemo side effect, influenza, anemia.     Basic labs checked with normal WBC/RBC/platelet counts, normal CMP and negative influenza.  Most likely chemotherapy side effects, less likely early viral illness.  Discussed with Ob/gyn consult, who discussed with gyn Oncology attending and agree no further emergent workup needed.  Since OTC meds not effective in patient did have relief with oxycodone after her hysterectomy, will give short-term Rx for this now until patient can follow up in clinic.  She will return to  the ED for any worsening pain, or new symptoms such as fevers, generalized weakness, or any other concerns.      Clinical Tests:   Lab Tests: Ordered and Reviewed            Scribe Attestation:   Scribe #1: I performed the above scribed service and the documentation accurately describes the services I performed. I attest to the accuracy of the note.    Attending Attestation:           Physician Attestation for Scribe:  Physician Attestation Statement for Scribe #1: I, Dr. Tam, reviewed documentation, as scribed by Leeroy Mackenzie in my presence, and it is both accurate and complete.                               Clinical Impression:   No diagnosis found.                            Stef Tam MD  11/25/19 0002

## 2019-11-22 NOTE — ED TRIAGE NOTES
"Pt presents to ED with c/o generalized body aches that started around 2200 last night. Reports it started in her knees and gradually moved up to her hands, elbows, and shoulders. States "the pain feels like it is in my bones. Sometimes it's sharp and other times it is just aching". States she was just recently Dx with cancer and just had her first round of chemo yesterday. Denies fever, N/V/D, chest pain, SOB, and any trauma   "

## 2019-11-22 NOTE — TELEPHONE ENCOUNTER
"RN returned patient message, patient reports "body aches allover and in my bones." Rates pain 10/10. Chemo infusion 2 days ago. ED visit this morning with prescription for oxycodone given, flu-. Encouraged the patient to begin taking otc pain relievers continuously and increase hydration. Encouraged adequate rest. MD aware of patient complaints.  "

## 2019-11-25 ENCOUNTER — PATIENT MESSAGE (OUTPATIENT)
Dept: GYNECOLOGIC ONCOLOGY | Facility: CLINIC | Age: 64
End: 2019-11-25

## 2019-11-27 ENCOUNTER — TELEPHONE (OUTPATIENT)
Dept: GYNECOLOGIC ONCOLOGY | Facility: CLINIC | Age: 64
End: 2019-11-27

## 2019-11-27 NOTE — TELEPHONE ENCOUNTER
----- Message from Scottie Dumont MD sent at 11/27/2019 10:39 AM CST -----  Let's see her on the 6th.  If we can do her labs then we can cancel her appointment on the 9th and she can go straight to chemotherapy.  She wants to be seen next week.    Thank you.     ----- Message -----  From: Mac Pisano  Sent: 11/27/2019  10:00 AM CST  To: Ngozi Fu RN, Scottie Dumont MD, #    Good Morning, Dr. Tim Melvin already have her schedule on the 9th for lab/MD. chemo on the 11 th. Do you need to see her on the 6th as well? Thanks   ----- Message -----  From: Scottie Dumont MD  Sent: 11/27/2019   8:59 AM CST  To: Mac Pisano, [facility] [place],    Can we schedule this patient for a RONC with me next Friday?  Also, Ngozi can you follow-up with her on Monday?    Thanks.

## 2019-11-27 NOTE — PROGRESS NOTES
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  She subsequently developed bilateral knee pain which radiated to her extremities. She says that the pain is intermittent and worse at night.  She describes it as cramping.  She denies any issues with balance.  She states that ambulation is a little difficult.  She denies any ground level falls.  She also denies any changes in her speech, sensory, and motor function.  The pain is associated with general fatigue.  He denies any sick contacts, nausea, vomiting, fevers, chills, or night sweats.  She was evaluated in the emergency department on November 22nd and November 25th.  CBC and CMP were within normal limits.  Viral upper respiratory infection panel was negative.  Two other patients who received chemotherapy recently reported similar chief complaints.  The 2 other patients receive the same lot number chemotherapy.  Over 50 chemotherapy from this lot number were administered.  In total 3 patient has had similar complaints.  The complaints are nonspecific and could be attributed to other etiologies.  This point the chemotherapy should be cleared from the patient's system.  Her symptoms could be consistent with neuropathy.  Since the patient is feeling better I gave her strong return precautions.  I will see her in clinic next week.  If her symptoms are better I will further investigate the possibility of neuropathy and consider an internal medicine consultation.  The patient voiced understanding.  All questions were answered.

## 2019-12-02 ENCOUNTER — TELEPHONE (OUTPATIENT)
Dept: GYNECOLOGIC ONCOLOGY | Facility: CLINIC | Age: 64
End: 2019-12-02

## 2019-12-04 ENCOUNTER — HOSPITAL ENCOUNTER (EMERGENCY)
Facility: OTHER | Age: 64
Discharge: HOME | End: 2019-12-04
Attending: EMERGENCY MEDICINE
Payer: MEDICAID

## 2019-12-04 VITALS
TEMPERATURE: 103 F | HEART RATE: 89 BPM | HEIGHT: 66 IN | SYSTOLIC BLOOD PRESSURE: 141 MMHG | OXYGEN SATURATION: 96 % | BODY MASS INDEX: 43.39 KG/M2 | WEIGHT: 270 LBS | RESPIRATION RATE: 17 BRPM | DIASTOLIC BLOOD PRESSURE: 70 MMHG

## 2019-12-04 DIAGNOSIS — R05.9 COUGH: ICD-10-CM

## 2019-12-04 DIAGNOSIS — J11.1 INFLUENZA: Primary | ICD-10-CM

## 2019-12-04 LAB
CTP QC/QA: YES
POC MOLECULAR INFLUENZA A AGN: NEGATIVE
POC MOLECULAR INFLUENZA B AGN: POSITIVE

## 2019-12-04 PROCEDURE — 25000003 PHARM REV CODE 250: Performed by: EMERGENCY MEDICINE

## 2019-12-04 PROCEDURE — 99283 EMERGENCY DEPT VISIT LOW MDM: CPT | Mod: 25

## 2019-12-04 RX ORDER — ACETAMINOPHEN 500 MG
1000 TABLET ORAL
Status: COMPLETED | OUTPATIENT
Start: 2019-12-04 | End: 2019-12-04

## 2019-12-04 RX ADMIN — ACETAMINOPHEN 1000 MG: 500 TABLET, FILM COATED ORAL at 04:12

## 2019-12-04 NOTE — ED PROVIDER NOTES
Encounter Date: 2019    SCRIBE #1 NOTE: I, Claudia Shelby, am scribing for, and in the presence of, Dr. Trinidad.       History     Chief Complaint   Patient presents with    Cough     Non productive cough since monday with a headache, sneezing and runny nose      Time seen by provider: 3:10 PM    This is a 64 y.o. female who presents with complaint of dry cough with associated sinus pressure/pain, nasal congestion, sore throat, fever, and chills for the past 2 days. She also reports generalized myalgias at night. She drinks alcohol occasionally. She quit smoking some time ago. She denies illicit drug use. She denies any major medical problems. She does not take any daily medications. She denies any urinary sx.    The history is provided by the patient.     Review of patient's allergies indicates:  No Known Allergies  Past Medical History:   Diagnosis Date    Arthritis     right knee    Cancer     Diverticulitis      Past Surgical History:   Procedure Laterality Date    BIOPSY OF ABDOMINAL WALL  10/1/2019    Procedure: BIOPSY, omental;  Surgeon: Scottie Dumont MD;  Location: [facility];  Service: Oncology;;     SECTION, CLASSIC  1989    HYSTEROSCOPY WITH DILATION AND CURETTAGE OF UTERUS N/A 2019    Procedure: HYSTEROSCOPY, WITH DILATION AND CURETTAGE OF UTERUS;  Surgeon: Komal Solis MD;  Location: [facility];  Service: OB/GYN;  Laterality: N/A;    LAPAROTOMY N/A 10/1/2019    Procedure: MINI LAPAROTOMY;  Surgeon: Scottie Dumont MD;  Location: [facility] OR;  Service: Oncology;  Laterality: N/A;    ROBOT-ASSISTED LAPAROSCOPIC HYSTERECTOMY N/A 10/1/2019    Procedure: ROBOTIC HYSTERECTOMY;  Surgeon: Scottie Dumont MD;  Location: [facility] OR;  Service: Oncology;  Laterality: N/A;    ROBOT-ASSISTED LAPAROSCOPIC LYSIS OF ADHESIONS USING DA TOYA XI  10/1/2019    Procedure: XI ROBOTIC LYSIS, ADHESIONS;  Surgeon: Scottie Dumont MD;  Location: [facility] OR;  Service: Oncology;;    ROBOT-ASSISTED LAPAROSCOPIC PELVIC  LYMPHADENECTOMY USING DA TOYA XI Bilateral 10/1/2019    Procedure: XI ROBOTIC LYMPHADENECTOMY, PELVIC;  Surgeon: Scottie Dumont MD;  Location: [facility] OR;  Service: Oncology;  Laterality: Bilateral;    SALPINGOOPHORECTOMY Bilateral 10/1/2019    Procedure: SALPINGO-OOPHORECTOMY;  Surgeon: Scottie Dumont MD;  Location: [facility] OR;  Service: Oncology;  Laterality: Bilateral;    WISDOM TOOTH EXTRACTION       Family History   Problem Relation Age of Onset    Lung cancer Maternal Uncle     Breast cancer Neg Hx     Cancer Neg Hx     Ovarian cancer Neg Hx      Social History     Tobacco Use    Smoking status: Former Smoker     Packs/day: 0.50     Types: Cigarettes, Cigars    Smokeless tobacco: Never Used   Substance Use Topics    Alcohol use: Yes    Drug use: No     Review of Systems   Constitutional: Positive for chills and fever.   HENT: Positive for congestion (nasal), sinus pressure, sinus pain and sore throat.    Eyes: Negative for photophobia and redness.   Respiratory: Positive for cough (dry). Negative for shortness of breath.    Cardiovascular: Negative for chest pain.   Gastrointestinal: Negative for abdominal pain, nausea and vomiting.   Genitourinary: Negative for difficulty urinating, dysuria, frequency and urgency.   Musculoskeletal: Positive for myalgias (generalized). Negative for back pain.   Skin: Negative for rash.   Neurological: Negative for weakness, light-headedness and headaches.   Psychiatric/Behavioral: Negative for confusion.       Physical Exam     Initial Vitals [12/04/19 1351]   BP Pulse Resp Temp SpO2   (!) 146/70 94 18 100.3 °F (37.9 °C) 98 %      MAP       --         Physical Exam    Nursing note and vitals reviewed.  Constitutional: She appears well-developed and well-nourished. She is not diaphoretic. No distress.   HENT:   Head: Normocephalic and atraumatic.   Oropharynx is clear and intact.  Moist mucous membranes.   Eyes: EOM are normal. Pupils are equal, round, and reactive to  light. No scleral icterus.   Conjunctiva are pink, clear, and intact.   Neck: Normal range of motion. Neck supple.   Cardiovascular: Normal rate, regular rhythm, S1 normal, S2 normal and normal heart sounds. Exam reveals no gallop and no friction rub.    No murmur heard.  Normal S1, S2.   Pulmonary/Chest: Breath sounds normal. No respiratory distress. She has no wheezes. She has no rhonchi. She has no rales.   Abdominal: Soft. Bowel sounds are normal. There is no tenderness. There is no rebound and no guarding.   Musculoskeletal: Normal range of motion. She exhibits no edema or tenderness.   No lower extremity edema.    Lymphadenopathy:     She has no cervical adenopathy.   Neurological: She is alert and oriented to person, place, and time.   Skin: Skin is warm and dry. Capillary refill takes less than 2 seconds. No rash noted. No pallor.   Psychiatric: She has a normal mood and affect. Her behavior is normal. Judgment and thought content normal.         ED Course   Procedures  Labs Reviewed   POCT INFLUENZA A/B MOLECULAR - Abnormal; Notable for the following components:       Result Value    POC Molecular Influenza B Ag Positive (*)     All other components within normal limits   INFLUENZA A & B BY MOLECULAR          Imaging Results          X-Ray Chest PA And Lateral (Final result)  Result time 12/04/19 15:45:59    Final result by Jones Rahman MD (12/04/19 15:45:59)                 Impression:      Normal chest      Electronically signed by: Jones Rahman MD  Date:    12/04/2019  Time:    15:45             Narrative:    EXAMINATION:  XR CHEST PA AND LATERAL    CLINICAL HISTORY:  Cough    TECHNIQUE:  PA and lateral views of the chest were performed.    COMPARISON:  None    FINDINGS:  Heart size is normal.  Lungs are clear.  No infiltrate is seen.                              X-Rays:   Independently Interpreted Readings:   Chest X-Ray: No acute findings. No consolidation. No masses. No pulmonary edema.      Medical Decision Making:   History:   Old Medical Records: I decided to obtain old medical records.  Independently Interpreted Test(s):   I have ordered and independently interpreted X-rays - see prior notes.  Clinical Tests:   Lab Tests: Ordered and Reviewed  Radiological Study: Ordered and Reviewed            Scribe Attestation:   Scribe #1: I performed the above scribed service and the documentation accurately describes the services I performed. I attest to the accuracy of the note.    Attending Attestation:           Physician Attestation for Scribe:  Physician Attestation Statement for Scribe #1: I, Dr. Harris, reviewed documentation, as scribed by Claudia Shelby in my presence, and it is both accurate and complete.         Attending ED Notes:   Emergent evaluation a 64-year-old female with cough, cold, nasal congestion and runny nose. Patient is afebrile, nontoxic appearing with stable vital signs.  Patient in no acute respiratory distress.  Lungs are clear to auscultation bilaterally. No acute findings on chest x-ray.  Influenza screen is negative.  The patient is extensively counseled her diagnosis and treatment including all diagnostic, laboratory and physical exam findings.  The patient discharged good condition and directed follow-up with her PCP in the next 24-48 hours.                        Clinical Impression:     1. Influenza    2. Cough                                Del Harris MD  12/04/19 3057

## 2019-12-04 NOTE — ED NOTES
Hx of uterine cancer, receiving chemotherapy, last chem x 3 weeks ago, reporting new onset fever, chills, body aches, cough. Denies CP. Pt AAOx4 and appropriate at this time. Respirations even and unlabored. No acute distress noted.

## 2019-12-05 ENCOUNTER — PATIENT MESSAGE (OUTPATIENT)
Dept: GYNECOLOGIC ONCOLOGY | Facility: CLINIC | Age: 64
End: 2019-12-05

## 2019-12-13 ENCOUNTER — TELEPHONE (OUTPATIENT)
Dept: GYNECOLOGIC ONCOLOGY | Facility: CLINIC | Age: 64
End: 2019-12-13

## 2019-12-13 ENCOUNTER — DOCUMENTATION ONLY (OUTPATIENT)
Dept: GYNECOLOGIC ONCOLOGY | Facility: CLINIC | Age: 64
End: 2019-12-13

## 2019-12-13 NOTE — PROGRESS NOTES
Patient was recently diagnosed with the flu.  Her symptoms are improving.  Her chief complaint now is congestion which is relieved with Mucinex.  She denies any fevers, chills, night sweats, chest pain, shortness of air, or myalgias.  I reassured the patient that her reaction to the chemotherapy is more likely related to the flu.  I mentioned that another patient who had similar symptoms received her 2nd cycle of chemotherapy on Monday and tolerated without any complications.  The patient insists that her constitutional symptoms are related to chemotherapy.  She is also upset about losing her hair and reports discoloration of her scalp.  She is also reporting some neuropathy which is stable.  I encouraged the patient to reconsider chemotherapy.  He will reduce her risk of recurrence.  If she recurs her disease is incurable.  She wishes to not proceed with chemotherapy.  I encouraged her to call us when she feels better for me to evaluate her neuropathy and scalp.  She voiced understanding.  All questions were answered.

## 2019-12-24 ENCOUNTER — PROCEDURE VISIT (OUTPATIENT)
Dept: RADIATION ONCOLOGY | Facility: CLINIC | Age: 64
End: 2019-12-24
Payer: MEDICAID

## 2019-12-24 ENCOUNTER — HOSPITAL ENCOUNTER (OUTPATIENT)
Dept: RADIATION THERAPY | Facility: HOSPITAL | Age: 64
Discharge: HOME | End: 2019-12-24
Attending: RADIOLOGY
Payer: MEDICAID

## 2019-12-24 DIAGNOSIS — C54.1 MALIGNANT NEOPLASM OF ENDOMETRIUM: Primary | ICD-10-CM

## 2019-12-24 PROCEDURE — 77263 THER RADIOLOGY TX PLNG CPLX: CPT | Mod: ,,, | Performed by: RADIOLOGY

## 2019-12-24 PROCEDURE — 77014 HC CT GUIDANCE RADIATION THERAPY FLDS PLACEMENT: CPT | Mod: TC | Performed by: RADIOLOGY

## 2019-12-24 PROCEDURE — 77290 THER RAD SIMULAJ FIELD CPLX: CPT | Mod: TC | Performed by: RADIOLOGY

## 2019-12-24 PROCEDURE — 77334 RADIATION TREATMENT AID(S): CPT | Mod: TC | Performed by: RADIOLOGY

## 2019-12-24 PROCEDURE — 77263 PR  RADIATION THERAPY PLAN COMPLEX: ICD-10-PCS | Mod: ,,, | Performed by: RADIOLOGY

## 2019-12-24 PROCEDURE — 77334 RADIATION TREATMENT AID(S): CPT | Mod: 26,,, | Performed by: RADIOLOGY

## 2019-12-24 PROCEDURE — 77334 PR  RADN TREATMENT AID(S) COMPLX: ICD-10-PCS | Mod: 26,,, | Performed by: RADIOLOGY

## 2019-12-24 PROCEDURE — 77290 PR  SET RADN THERAPY FIELD COMPLEX: ICD-10-PCS | Mod: 26,,, | Performed by: RADIOLOGY

## 2019-12-24 PROCEDURE — 77290 THER RAD SIMULAJ FIELD CPLX: CPT | Mod: 26,,, | Performed by: RADIOLOGY

## 2019-12-24 NOTE — PROCEDURES
Procedures     Ms. Becker returns for treatment planning CT prior to start of vaginal cuff brachytherapy. She had one cycle of chemo and has decided not to continue.  She is placed supine on the CT table.  A 3.0 cm cylinder was inserted into the vagina and stabilized. Images were obtained and verified.  The vaginal cylinder was removed without complication.  She will return as scheduled to initiate vaginal cuff brachytherapy.

## 2020-01-02 ENCOUNTER — HOSPITAL ENCOUNTER (OUTPATIENT)
Dept: RADIATION THERAPY | Facility: HOSPITAL | Age: 65
Discharge: HOME | End: 2020-01-02
Attending: RADIOLOGY
Payer: MEDICAID

## 2020-01-09 PROCEDURE — 77470 SPECIAL RADIATION TREATMENT: CPT | Mod: 59,TC | Performed by: RADIOLOGY

## 2020-01-09 PROCEDURE — 77300 RADIATION THERAPY DOSE PLAN: CPT | Mod: 26,,, | Performed by: RADIOLOGY

## 2020-01-09 PROCEDURE — 77470 PR  SPECIAL RADIATION TREATMENT: ICD-10-PCS | Mod: 26,59,, | Performed by: RADIOLOGY

## 2020-01-09 PROCEDURE — 77470 SPECIAL RADIATION TREATMENT: CPT | Mod: 26,59,, | Performed by: RADIOLOGY

## 2020-01-09 PROCEDURE — 77295 PR 3D RADIOTHERAPY PLAN: ICD-10-PCS | Mod: 26,,, | Performed by: RADIOLOGY

## 2020-01-09 PROCEDURE — 77300 RADIATION THERAPY DOSE PLAN: CPT | Mod: TC | Performed by: RADIOLOGY

## 2020-01-09 PROCEDURE — 77370 RADIATION PHYSICS CONSULT: CPT | Performed by: RADIOLOGY

## 2020-01-09 PROCEDURE — 77295 3-D RADIOTHERAPY PLAN: CPT | Mod: 26,,, | Performed by: RADIOLOGY

## 2020-01-09 PROCEDURE — 77295 3-D RADIOTHERAPY PLAN: CPT | Mod: TC | Performed by: RADIOLOGY

## 2020-01-09 PROCEDURE — 77300 PR RADIATION THERAPY,DOSIMETRY PLAN: ICD-10-PCS | Mod: 26,,, | Performed by: RADIOLOGY

## 2020-01-14 ENCOUNTER — PROCEDURE VISIT (OUTPATIENT)
Dept: RADIATION ONCOLOGY | Facility: CLINIC | Age: 65
End: 2020-01-14
Payer: MEDICAID

## 2020-01-14 DIAGNOSIS — C54.1 MALIGNANT NEOPLASM OF ENDOMETRIUM: Primary | ICD-10-CM

## 2020-01-14 PROCEDURE — 57156 PR INSERT VAGINAL RADIATION DEVICE: ICD-10-PCS | Mod: ,,, | Performed by: RADIOLOGY

## 2020-01-14 PROCEDURE — 77770 PR HDR RDNCL NTRSTL/ICAV BRCHTX 1 CH: ICD-10-PCS | Mod: 26,,, | Performed by: RADIOLOGY

## 2020-01-14 PROCEDURE — 57156 INS VAG BRACHYTX DEVICE: CPT | Mod: ,,, | Performed by: RADIOLOGY

## 2020-01-14 PROCEDURE — C1717 BRACHYTX, NON-STR,HDR IR-192: HCPCS | Performed by: RADIOLOGY

## 2020-01-14 PROCEDURE — 77770 HDR RDNCL NTRSTL/ICAV BRCHTX: CPT | Mod: TC | Performed by: RADIOLOGY

## 2020-01-14 PROCEDURE — 57156 INS VAG BRACHYTX DEVICE: CPT | Mod: TC | Performed by: RADIOLOGY

## 2020-01-14 PROCEDURE — 77770 HDR RDNCL NTRSTL/ICAV BRCHTX: CPT | Mod: 26,,, | Performed by: RADIOLOGY

## 2020-01-14 PROCEDURE — 77300 RADIATION THERAPY DOSE PLAN: CPT | Mod: TC | Performed by: RADIOLOGY

## 2020-01-14 NOTE — PROCEDURES
Procedures     PROCEDURE NOTE:         REFERRING PHYSICIAN: Scottie Bee M.D.    DIAGNOSIS: pT1a N0 M0, FIGO stage IA, endometrioid carcinoma, grade 3    Ms. Becker is a 64-year-old female who was recently diagnosed with endometrial cancer after evaluation for postmenopausal bleeding. A biopsy on August 19, 2019 was negative for malignancy. Endocervical curettage and biopsy on September 13, 2019 revealed fragments of carcinosarcoma, FIGO grade 3 with extensive necrosis. A CT of the chest, abdomen, and pelvis on September 25, 2019 was negative for metastatic disease or lymphadenopathy. However, the uterus appeared to be lobulated. On October 1, 2019, she underwent TAHBSO. Pathology revealed the uterus with high-grade endometrioid carcinoma, FIGO 3, with invasion of less than 15% of the myometrium (depth not reported). No lymphovascular invasion or cervical stromal involvement was noted. A mentum was negative for malignancy. 11/11 right pelvic lymph nodes and 12/12 left pelvic lymph nodes were negative for involvement. After immunohistochemistry testing, there is no loss of nuclear expression of MMR proteins with the low probability of microsatellite instability. She is recommended to undergo adjuvant chemotherapy and vaginal cuff brachytherapy to reduce her chance of recurrence.  However, after receiving the 1st cycle of chemotherapy she has decided to discontinue that.  She is here today for her 1st radiation treatment.      DATE OF PROCEDURE: 01/14/2020      PROCEDURE: Intracavitary vaginal HDR #1      AREA TREATED: Vaginal cuff and upper 5 cm of the vagina      TREATMENT METHOD: Insertion of 3.0 cm vaginal cylinder into vagina      RADIATION: Iridium 192, high-dose-rate      DEPTH OF CALCULATION:  vaginal (cylinder) surface      DOSE:  5 Gy      Time Out:   Performed by Pallavi Lester RN  Identifiers used: Name and date of birth      She was brought to the HDR delivery room and a 3.0 cm vaginal cylinder was placed  into the vagina. The brachytherapy catheter was connected to the cylinder and the treatment was delivered. She received the 1st out of 5 fractions of HDR vaginal brachytherapy as above. She tolerated the treatment without any unexpected side effects. She will return in 1 week to continue her treatment. I was present during the entire procedure

## 2020-01-14 NOTE — NURSING
01/14/2020  Nurse: Pallavi Lester    Procedure: Vaginal Cylinder    0920: Patient arrived from Home.  Time out performed.    0922: Positioned on Stretcher in the Frog Leg position. Positioned with Knee Immobilizer by myself.    0925: 3.0 cm vaginal cylinder inserted.  HDR treatment given with full bladder.  Pt. Tolerated well.

## 2020-01-21 ENCOUNTER — TELEPHONE (OUTPATIENT)
Dept: GYNECOLOGIC ONCOLOGY | Facility: CLINIC | Age: 65
End: 2020-01-21

## 2020-01-21 ENCOUNTER — PATIENT MESSAGE (OUTPATIENT)
Dept: RADIATION ONCOLOGY | Facility: CLINIC | Age: 65
End: 2020-01-21

## 2020-01-21 NOTE — TELEPHONE ENCOUNTER
----- Message from Scottie Dumont MD sent at 1/20/2020  4:58 PM CST -----  Can we please schedule a [facility] appointment for this patient this month?  It's for surveillance of her endometrial cancer.    Thank you.

## 2020-01-23 ENCOUNTER — OFFICE VISIT (OUTPATIENT)
Dept: GYNECOLOGIC ONCOLOGY | Facility: CLINIC | Age: 65
End: 2020-01-23
Payer: MEDICAID

## 2020-01-23 VITALS
WEIGHT: 267.19 LBS | HEART RATE: 63 BPM | BODY MASS INDEX: 42.94 KG/M2 | SYSTOLIC BLOOD PRESSURE: 138 MMHG | HEIGHT: 66 IN | DIASTOLIC BLOOD PRESSURE: 66 MMHG

## 2020-01-23 DIAGNOSIS — E66.01 MORBID OBESITY WITH BMI OF 40.0-44.9, ADULT: ICD-10-CM

## 2020-01-23 DIAGNOSIS — D06.9 CIN III WITH SEVERE DYSPLASIA: ICD-10-CM

## 2020-01-23 DIAGNOSIS — C54.1 MALIGNANT NEOPLASM OF ENDOMETRIUM: Primary | ICD-10-CM

## 2020-01-23 PROCEDURE — 99213 OFFICE O/P EST LOW 20 MIN: CPT | Mod: PBBFAC | Performed by: OBSTETRICS & GYNECOLOGY

## 2020-01-23 PROCEDURE — 99999 PR PBB SHADOW E&M-EST. PATIENT-LVL III: CPT | Mod: PBBFAC,,, | Performed by: OBSTETRICS & GYNECOLOGY

## 2020-01-23 PROCEDURE — 99999 PR PBB SHADOW E&M-EST. PATIENT-LVL III: ICD-10-PCS | Mod: PBBFAC,,, | Performed by: OBSTETRICS & GYNECOLOGY

## 2020-01-23 PROCEDURE — 99214 OFFICE O/P EST MOD 30 MIN: CPT | Mod: S$PBB,,, | Performed by: OBSTETRICS & GYNECOLOGY

## 2020-01-23 PROCEDURE — 99214 PR OFFICE/OUTPT VISIT, EST, LEVL IV, 30-39 MIN: ICD-10-PCS | Mod: S$PBB,,, | Performed by: OBSTETRICS & GYNECOLOGY

## 2020-01-23 NOTE — PROGRESS NOTES
REFERRING PROVIDER  No ref. provider found     REASON FOR CONSULT  Denisha Becker  is a 64 y.o.  woman who presents for evaluation of stage IA grade 3 endometrioid endometrial cancer, MMR proficient.    HISTORY OF PRESENT ILLNESS    Please refer below for the patient's tumor history.  The interval history is as follows: -Abdominal pain, bloating, changes in her stool or urine, VD.  She does report some VB which was noted once after intercourse.  It was light.  It has not recurred.         Malignant neoplasm of endometrium    9/24/2019 Imaging Significant Findings     CT C/A/P Negative for metastases      9/25/2019 Tumor Markers      WNL      10/1/2019 Cancer Staged     Stage IA grade 3 endometrioid endometrial cancer status post total robotic hysterectomy, bilateral salpingo-oophorectomy, bilateral pelvic lymph node dissection, and omental biopsy.      Grade 3 endometrioid endometrial cancer.  Size of the cancer is unclear.  Myometrial invasion was 0.2/1.4 cm.  LVSI was absent.  Twenty-three pelvic lymph nodes were negative.  Omental biopsy was negative.     Cervix showed ERMIAS 3.      11/15/2019 - 11/20/2019 Chemotherapy     Treatment Summary   Plan Name: OP GYN PACLITAXEL CARBOPLATIN (AUC 6) Q3W  Treatment Goal: Curative  Status: Active  Start Date: 11/20/2019  End Date: 2/28/2020 (Planned)  Provider: Scottie Dumont MD  Chemotherapy: CARBOplatin (PARAPLATIN) 900 mg in sodium chloride 0.9% 250 mL chemo infusion, 900 mg (100.9 % of original dose 892.2 mg), Intravenous, Clinic/HOD 1 time, 1 of 6 cycles  Dose modification:   (original dose 892.2 mg, Cycle 1)  Administration: 900 mg (11/20/2019)  PACLitaxel (TAXOL) 175 mg/m2 = 420 mg in sodium chloride 0.9% 500 mL chemo infusion, 175 mg/m2 = 420 mg, Intravenous, Clinic/HOD 1 time, 1 of 6 cycles  Administration: 420 mg (11/20/2019)    Patient experienced myalgia, arthralgia, and neuralgia and chose to discontinue chemotherapy despite being counseled on the benefits.               REVIEW OF SYSTEMS  All systems reviewed and negative except as noted in HPI.    OBJECTIVE   Vitals:    01/23/20 1011   BP: 138/66   Pulse: 63      Body mass index is 43.13 kg/m².      1. General: Well appearing, no apparent distress, alert and oriented.  2. Lymph: Neck symmetric without cervical or supraclavicular adenopathy or mass.  3. Lungs: Normal respiratory rate, no accessory muscle use.  4. Cardiac: Normal rate  5. Psych: Normal affect.  6. Abdomen:  non-distended, soft,  7. Skin: Warm, dry, no rashes or lesions.   8. Extremities: Bilateral lower extremities without edema or tenderness.  9. Genitourinary               Pelvic Examination including:                a. External genitalia are normal in appearance. No lesions noted.               b. Urethral meatus is normal size, location, and appearance.               c. Urethra is negative.               d. Bladder is nontender. No masses noted.               e. Vagina has normal mucosa with physiologic discharge. No lesions noted.              f. Uterus absent              g. Adnexa absent    ECOG status: 1    LABORATORY DATA  Lab data reviewed.    RADIOLOGICAL DATA  Radiology data reviewed.    ASSESSMENT / PLAN     1. Malignant neoplasm of endometrium    2. ERMIAS III with severe dysplasia    3. Morbid obesity with BMI of 40.0-44.9, adult       We discussed issues with sexual intercourse.  She is concerned about vaginal cuff dehiscence.  I assured her that it is safe to resume sexual intercourse.  If it persists she should see a sexual see therapist.    She will need a Pap smear in October 2020.    We again discussed that my recommendation is adjuvant chemotherapy.  The patient at this time does not want to pursue any adjuvant chemotherapy.  She is aware of the increased risk of recurrence.  She has no evidence of disease will return to clinic in 3 months.    PATIENT EDUCATION  Ready to learn, no apparent learning barriers were identified; learning  preferences include listening. Explained diagnosis and treatment plan; patient expressed understanding of the content.    ADMINISTRATIVE BILLING  Greater than 50% of was spent in counseling.       Scottie Dumont

## 2020-01-28 ENCOUNTER — PROCEDURE VISIT (OUTPATIENT)
Dept: RADIATION ONCOLOGY | Facility: CLINIC | Age: 65
End: 2020-01-28
Payer: MEDICAID

## 2020-01-28 DIAGNOSIS — C54.1 MALIGNANT NEOPLASM OF ENDOMETRIUM: Primary | ICD-10-CM

## 2020-01-28 PROCEDURE — 77300 RADIATION THERAPY DOSE PLAN: CPT | Mod: TC | Performed by: RADIOLOGY

## 2020-01-28 PROCEDURE — 77770 HDR RDNCL NTRSTL/ICAV BRCHTX: CPT | Mod: 26,,, | Performed by: RADIOLOGY

## 2020-01-28 PROCEDURE — 77770 HDR RDNCL NTRSTL/ICAV BRCHTX: CPT | Mod: TC | Performed by: RADIOLOGY

## 2020-01-28 PROCEDURE — C1717 BRACHYTX, NON-STR,HDR IR-192: HCPCS | Performed by: RADIOLOGY

## 2020-01-28 PROCEDURE — 57156 PR INSERT VAGINAL RADIATION DEVICE: ICD-10-PCS | Mod: ,,, | Performed by: RADIOLOGY

## 2020-01-28 PROCEDURE — 77770 PR HDR RDNCL NTRSTL/ICAV BRCHTX 1 CH: ICD-10-PCS | Mod: 26,,, | Performed by: RADIOLOGY

## 2020-01-28 PROCEDURE — 57156 INS VAG BRACHYTX DEVICE: CPT | Mod: TC | Performed by: RADIOLOGY

## 2020-01-28 PROCEDURE — 57156 INS VAG BRACHYTX DEVICE: CPT | Mod: ,,, | Performed by: RADIOLOGY

## 2020-01-28 NOTE — PROCEDURES
Procedures     PROCEDURE NOTE:         REFERRING PHYSICIAN: Scottie Bee M.D.     DIAGNOSIS: pT1a N0 M0, FIGO stage IA, endometrioid carcinoma, grade 3     Ms. Becker is a 64-year-old female who was recently diagnosed with endometrial cancer after evaluation for postmenopausal bleeding. A biopsy on August 19, 2019 was negative for malignancy. Endocervical curettage and biopsy on September 13, 2019 revealed fragments of carcinosarcoma, FIGO grade 3 with extensive necrosis. A CT of the chest, abdomen, and pelvis on September 25, 2019 was negative for metastatic disease or lymphadenopathy. However, the uterus appeared to be lobulated. On October 1, 2019, she underwent TAHBSO. Pathology revealed the uterus with high-grade endometrioid carcinoma, FIGO 3, with invasion of less than 15% of the myometrium (depth not reported). No lymphovascular invasion or cervical stromal involvement was noted. A mentum was negative for malignancy. 11/11 right pelvic lymph nodes and 12/12 left pelvic lymph nodes were negative for involvement. After immunohistochemistry testing, there is no loss of nuclear expression of MMR proteins with the low probability of microsatellite instability. She is recommended to undergo adjuvant chemotherapy and vaginal cuff brachytherapy to reduce her chance of recurrence.  However, after receiving the 1st cycle of chemotherapy she has decided to discontinue that.  She is here today for her 2nd radiation treatment.      DATE OF PROCEDURE: 01/27/2020      PROCEDURE: Intracavitary vaginal HDR #2      AREA TREATED: Vaginal cuff and upper 5 cm of the vagina      TREATMENT METHOD: Insertion of 3.0 cm vaginal cylinder into vagina      RADIATION: Iridium 192, high-dose-rate      DEPTH OF CALCULATION:  vaginal (cylinder) surface      DOSE:  5 Gy      Time Out:   Performed by Pallavi Lester RN  Identifiers used: Name and date of birth      She was brought to the HDR delivery room and a 3.0 cm vaginal cylinder was  placed into the vagina. The brachytherapy catheter was connected to the cylinder and the treatment was delivered. She received the 2nd out of 5 fractions of HDR vaginal brachytherapy as above. She tolerated the treatment without any unexpected side effects. She will return in 1 week to continue her treatment. I was present during the entire procedure

## 2020-01-28 NOTE — NURSING
01/28/2020  Nurse: Pallavi Lester    Procedure: Vaginal Cylinder    0855: Patient arrived from Home.  Time out performed..    0857: Positioned on Stretcher in the Frog Leg position. Positioned with Knee Immobilizer by myself.    0859: 3.0cm vaginal cylinder inserted.  HDR treatment given with full bladder.  Pt. Tolerated well.

## 2020-02-03 ENCOUNTER — HOSPITAL ENCOUNTER (OUTPATIENT)
Dept: RADIATION THERAPY | Facility: HOSPITAL | Age: 65
Discharge: HOME | End: 2020-02-03
Attending: RADIOLOGY
Payer: MEDICAID

## 2020-02-04 ENCOUNTER — PROCEDURE VISIT (OUTPATIENT)
Dept: RADIATION ONCOLOGY | Facility: CLINIC | Age: 65
End: 2020-02-04
Payer: MEDICAID

## 2020-02-04 DIAGNOSIS — C54.1 MALIGNANT NEOPLASM OF ENDOMETRIUM: Primary | ICD-10-CM

## 2020-02-04 DIAGNOSIS — B37.31 CANDIDIASIS OF VAGINA: ICD-10-CM

## 2020-02-04 PROCEDURE — 57156 INS VAG BRACHYTX DEVICE: CPT | Mod: ,,, | Performed by: RADIOLOGY

## 2020-02-04 PROCEDURE — 57156 PR INSERT VAGINAL RADIATION DEVICE: ICD-10-PCS | Mod: ,,, | Performed by: RADIOLOGY

## 2020-02-04 PROCEDURE — 77770 HDR RDNCL NTRSTL/ICAV BRCHTX: CPT | Mod: 26,,, | Performed by: RADIOLOGY

## 2020-02-04 PROCEDURE — C1717 BRACHYTX, NON-STR,HDR IR-192: HCPCS | Performed by: RADIOLOGY

## 2020-02-04 PROCEDURE — 77770 PR HDR RDNCL NTRSTL/ICAV BRCHTX 1 CH: ICD-10-PCS | Mod: 26,,, | Performed by: RADIOLOGY

## 2020-02-04 PROCEDURE — 57156 INS VAG BRACHYTX DEVICE: CPT | Mod: TC | Performed by: RADIOLOGY

## 2020-02-04 PROCEDURE — 77770 HDR RDNCL NTRSTL/ICAV BRCHTX: CPT | Mod: TC | Performed by: RADIOLOGY

## 2020-02-04 PROCEDURE — 77300 RADIATION THERAPY DOSE PLAN: CPT | Mod: TC | Performed by: RADIOLOGY

## 2020-02-04 RX ORDER — FLUCONAZOLE 100 MG/1
100 TABLET ORAL DAILY
Qty: 3 TABLET | Refills: 0 | Status: SHIPPED | OUTPATIENT
Start: 2020-02-04 | End: 2020-02-07

## 2020-02-04 NOTE — PROCEDURES
Procedures     PROCEDURE NOTE:         REFERRING PHYSICIAN: Scottie Bee M.D.     DIAGNOSIS: pT1a N0 M0, FIGO stage IA, endometrioid carcinoma, grade 3     Ms. Becker is a 64-year-old female who was recently diagnosed with endometrial cancer after evaluation for postmenopausal bleeding. A biopsy on August 19, 2019 was negative for malignancy. Endocervical curettage and biopsy on September 13, 2019 revealed fragments of carcinosarcoma, FIGO grade 3 with extensive necrosis. A CT of the chest, abdomen, and pelvis on September 25, 2019 was negative for metastatic disease or lymphadenopathy. However, the uterus appeared to be lobulated. On October 1, 2019, she underwent TAHBSO. Pathology revealed the uterus with high-grade endometrioid carcinoma, FIGO 3, with invasion of less than 15% of the myometrium (depth not reported). No lymphovascular invasion or cervical stromal involvement was noted. A mentum was negative for malignancy. 11/11 right pelvic lymph nodes and 12/12 left pelvic lymph nodes were negative for involvement. After immunohistochemistry testing, there is no loss of nuclear expression of MMR proteins with the low probability of microsatellite instability. She is recommended to undergo adjuvant chemotherapy and vaginal cuff brachytherapy to reduce her chance of recurrence.  However, after receiving the 1st cycle of chemotherapy she has decided to discontinue that.  She is here today for her 3rd radiation treatment.      DATE OF PROCEDURE: 02/04/2020      PROCEDURE: Intracavitary vaginal HDR #3      AREA TREATED: Vaginal cuff and upper 5 cm of the vagina      TREATMENT METHOD: Insertion of 3.0 cm vaginal cylinder into vagina      RADIATION: Iridium 192, high-dose-rate      DEPTH OF CALCULATION:  vaginal (cylinder) surface      DOSE:  5 Gy      Time Out:   Performed by Pallavi Lester RN  Identifiers used: Name and date of birth      She was brought to the HDR delivery room and a 3.0 cm vaginal cylinder was  placed into the vagina. The brachytherapy catheter was connected to the cylinder and the treatment was delivered. She received the 3rd out of 5 fractions of HDR vaginal brachytherapy as above. She tolerated the treatment without any unexpected side effects. She will return in 1 week to continue her treatment. I was present during the entire procedure.    Of note, she complains of vaginal itching discharge. Physical exam noted to have candidiasis of the vagina. Prescription given for diflucan 100 mg x 3 days.

## 2020-02-04 NOTE — NURSING
02/04/2020  Nurse: Pallavi Lester    Procedure: Vaginal Cylinder    0920: Patient arrived from Home.  Time out performed.    0922: Positioned on Stretcher in the Frog Leg position. Positioned with Knee Immobilizer by myself.    0923: 3.0cm vaginal cylinder inserted.  HDR treatment given with full bladder.  Pt. Tolerated well.

## 2020-02-11 ENCOUNTER — PROCEDURE VISIT (OUTPATIENT)
Dept: RADIATION ONCOLOGY | Facility: CLINIC | Age: 65
End: 2020-02-11
Payer: MEDICAID

## 2020-02-11 DIAGNOSIS — C54.1 MALIGNANT NEOPLASM OF ENDOMETRIUM: Primary | ICD-10-CM

## 2020-02-11 PROCEDURE — 77770 HDR RDNCL NTRSTL/ICAV BRCHTX: CPT | Mod: 26,,, | Performed by: RADIOLOGY

## 2020-02-11 PROCEDURE — 57156 PR INSERT VAGINAL RADIATION DEVICE: ICD-10-PCS | Mod: ,,, | Performed by: RADIOLOGY

## 2020-02-11 PROCEDURE — C1717 BRACHYTX, NON-STR,HDR IR-192: HCPCS | Performed by: RADIOLOGY

## 2020-02-11 PROCEDURE — 57156 INS VAG BRACHYTX DEVICE: CPT | Mod: ,,, | Performed by: RADIOLOGY

## 2020-02-11 PROCEDURE — 77770 HDR RDNCL NTRSTL/ICAV BRCHTX: CPT | Mod: TC | Performed by: RADIOLOGY

## 2020-02-11 PROCEDURE — 57156 INS VAG BRACHYTX DEVICE: CPT | Mod: TC | Performed by: RADIOLOGY

## 2020-02-11 PROCEDURE — 77770 PR HDR RDNCL NTRSTL/ICAV BRCHTX 1 CH: ICD-10-PCS | Mod: 26,,, | Performed by: RADIOLOGY

## 2020-02-11 PROCEDURE — 77300 RADIATION THERAPY DOSE PLAN: CPT | Mod: TC | Performed by: RADIOLOGY

## 2020-02-11 NOTE — PROCEDURES
Procedures     PROCEDURE NOTE:         REFERRING PHYSICIAN: Scottie Bee M.D.     DIAGNOSIS: pT1a N0 M0, FIGO stage IA, endometrioid carcinoma, grade 3     Ms. Becker is a 64-year-old female who was recently diagnosed with endometrial cancer after evaluation for postmenopausal bleeding. A biopsy on August 19, 2019 was negative for malignancy. Endocervical curettage and biopsy on September 13, 2019 revealed fragments of carcinosarcoma, FIGO grade 3 with extensive necrosis. A CT of the chest, abdomen, and pelvis on September 25, 2019 was negative for metastatic disease or lymphadenopathy. However, the uterus appeared to be lobulated. On October 1, 2019, she underwent TAHBSO. Pathology revealed the uterus with high-grade endometrioid carcinoma, FIGO 3, with invasion of less than 15% of the myometrium (depth not reported). No lymphovascular invasion or cervical stromal involvement was noted. A mentum was negative for malignancy. 11/11 right pelvic lymph nodes and 12/12 left pelvic lymph nodes were negative for involvement. After immunohistochemistry testing, there is no loss of nuclear expression of MMR proteins with the low probability of microsatellite instability. She is recommended to undergo adjuvant chemotherapy and vaginal cuff brachytherapy to reduce her chance of recurrence.  However, after receiving the 1st cycle of chemotherapy she has decided to discontinue that.  She is here today for her 3rd radiation treatment.      DATE OF PROCEDURE: 02/11/2020      PROCEDURE: Intracavitary vaginal HDR #4      AREA TREATED: Vaginal cuff and upper 5 cm of the vagina      TREATMENT METHOD: Insertion of 3.0 cm vaginal cylinder into vagina      RADIATION: Iridium 192, high-dose-rate      DEPTH OF CALCULATION:  vaginal (cylinder) surface      DOSE:  5 Gy      Time Out:   Performed by Pallavi Lester RN  Identifiers used: Name and date of birth      She was brought to the HDR delivery room and a 3.0 cm vaginal cylinder was  placed into the vagina. The brachytherapy catheter was connected to the cylinder and the treatment was delivered. She received the 4th out of 5 fractions of HDR vaginal brachytherapy as above. She tolerated the treatment without any unexpected side effects. She will return in 1 week to continue her treatment. I was present during the entire procedure.     Of note, she complains of vaginal itching discharge after the 3rd insertion.. Physical exam noted to have candidiasis of the vagina. Prescription given for diflucan 100 mg x 3 days. Vaginal discharge and irritation is much improved.

## 2020-02-11 NOTE — NURSING
02/11/2020  Nurse: Pallavi Lester    Procedure: Vaginal Cylinder    0920: Patient arrived from Home.  Time out performed.    0922: Positioned on Stretcher in the Frog Leg position. Positioned with Knee Immobilizer by myself.    0925:3.0cm vaginal cylinder inserted.  HDR treatment given with full bladder.  Pt. Tolerated well.   Initial /CM Assessment/Plan of Care Note     Baseline Assessment  76 year old admitted 4/17/2018 as Observation with a diagnosis of a right TKA.   Prior to admission patient was living with Spouse/significant other and residing at House.  Patient does  have a Power of  for Healthcare.  Document is not activated.  Patient’s Primary Care Provider is Genaro Elliott Jr., MD.     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   • Asthma    • CAD (coronary artery disease)     decreased EF   • Duodenal ulcer disease    • Gastroesophageal reflux disease    • Hyperlipidemia    • Left ventricular dysfunction 03/17/2007   • Morbid obesity (CMS/HCC)     s/p gastric bypass   • Non-ST elevated myocardial infarction (non-STEMI) (CMS/HCC) 03/17/2007    Non-ST-elevation MI, possible broken-heart syndrome   • Unspecified essential hypertension    • Urticaria        Prior to Admission Status  Functional Status  Ambulation: Independent/Self  Bathing: Independent/Self  Dressing: Independent/Self  Toileting: Independent/Self  Meal Preparation: Independent/Self  Medication Preparation: Independent/Self  Medication Administration: Independent/Self  Transportation: Independent/Self    Agency/Support  Type of Services Prior to Hospitalization: None  Support Systems: Spouse/Significant other, Children  Home Devices/Equipment: Mobility assist device  Mobility Assist Devices: None  Sensory Support Devices: Eyeglasses (readers)    Current Status  PT Ambulation Tips:    PT Transfer Tips:    OT Bathing Tips:    OT Dressing Tips:    OT Toileting Tips:    OT Feeding Tips:    SLP Swallow/Feeding Tips:    SLP Comm/Cog Tips:    Current Mental Status:    Stressors:      Insurance  Primary: MEDICARE  Secondary: [facility]    Barriers to Discharge  Identified Barriers to Discharge/Transition Planning: Assessment/stabilization in progress,  Discharge order/Medication reconciliation, Pain control, PT/OT clearance    Progress Note  Case discussed in OFT's, patient is medically stable for d/c today. SW consulted for d/c planning. Patient is alert and receptive to meeting with writer. Patient is POD 1 of a right TKA. Patient explained she and her  live in a single level ranch style home in [place]. At baseline she was functioning independently at home. Patient was independent with ambulation with no assistance device, medication management, self-cares, housekeeping tasks and driving. Patient explained her spouse also functions independently at home. Patient is planning to discharging home and is hopeful to d/c home this afternoon. Patient has out-patient therapy arranged at [facility] and family will provide transportation to and from appointments. Patient's spouse and daughter will be available 24/7 at home to provide support and assistance. Patient denied any additional needs at home. SW will remain available if needed.     Plan  SW/CM - Recommendations for Discharge: Home  PT - Recommendations for Discharge: Home, OP therapy (with assist from family)  OT - Recommendations for Discharge: Home  SLP - Recommendations for Discharge:    Anticipate patient will need post-hospital services. Necessary services are available.  Anticipate patient can return to the environment from which patient entered the hospital.   Anticipate patient can provide self-care at discharge.    Refer to SW/CM Flowsheet for Goals and objective data.

## 2020-02-13 ENCOUNTER — TELEPHONE (OUTPATIENT)
Dept: GYNECOLOGIC ONCOLOGY | Facility: CLINIC | Age: 65
End: 2020-02-13

## 2020-02-13 ENCOUNTER — OFFICE VISIT (OUTPATIENT)
Dept: GYNECOLOGIC ONCOLOGY | Facility: CLINIC | Age: 65
End: 2020-02-13
Payer: MEDICAID

## 2020-02-13 ENCOUNTER — LAB VISIT (OUTPATIENT)
Dept: LAB | Facility: OTHER | Age: 65
End: 2020-02-13
Payer: MEDICAID

## 2020-02-13 ENCOUNTER — PATIENT MESSAGE (OUTPATIENT)
Dept: GYNECOLOGIC ONCOLOGY | Facility: CLINIC | Age: 65
End: 2020-02-13

## 2020-02-13 VITALS
DIASTOLIC BLOOD PRESSURE: 63 MMHG | HEART RATE: 63 BPM | BODY MASS INDEX: 42.94 KG/M2 | SYSTOLIC BLOOD PRESSURE: 134 MMHG | WEIGHT: 267.19 LBS | HEIGHT: 66 IN

## 2020-02-13 DIAGNOSIS — D06.9 CIN III WITH SEVERE DYSPLASIA: ICD-10-CM

## 2020-02-13 DIAGNOSIS — E66.01 MORBID OBESITY WITH BMI OF 40.0-44.9, ADULT: ICD-10-CM

## 2020-02-13 DIAGNOSIS — C54.1 MALIGNANT NEOPLASM OF ENDOMETRIUM: ICD-10-CM

## 2020-02-13 DIAGNOSIS — R10.30 LOWER ABDOMINAL PAIN: ICD-10-CM

## 2020-02-13 DIAGNOSIS — R10.30 LOWER ABDOMINAL PAIN: Primary | ICD-10-CM

## 2020-02-13 DIAGNOSIS — Z87.19 HISTORY OF DIVERTICULITIS: ICD-10-CM

## 2020-02-13 LAB
BILIRUB UR QL STRIP: NEGATIVE
CLARITY UR REFRACT.AUTO: CLEAR
COLOR UR AUTO: YELLOW
GLUCOSE UR QL STRIP: NEGATIVE
HGB UR QL STRIP: NEGATIVE
KETONES UR QL STRIP: NEGATIVE
LEUKOCYTE ESTERASE UR QL STRIP: ABNORMAL
MICROSCOPIC COMMENT: NORMAL
NITRITE UR QL STRIP: NEGATIVE
PH UR STRIP: 8.5 [PH] (ref 5–8)
PROT UR QL STRIP: NEGATIVE
SP GR UR STRIP: 1.01 (ref 1–1.03)
SQUAMOUS #/AREA URNS HPF: 2 /HPF
URN SPEC COLLECT METH UR: ABNORMAL
WBC #/AREA URNS HPF: 0 /HPF (ref 0–5)

## 2020-02-13 PROCEDURE — 99999 PR PBB SHADOW E&M-EST. PATIENT-LVL III: CPT | Mod: PBBFAC,,, | Performed by: OBSTETRICS & GYNECOLOGY

## 2020-02-13 PROCEDURE — 99215 OFFICE O/P EST HI 40 MIN: CPT | Mod: S$PBB,,, | Performed by: OBSTETRICS & GYNECOLOGY

## 2020-02-13 PROCEDURE — 99999 PR PBB SHADOW E&M-EST. PATIENT-LVL III: ICD-10-PCS | Mod: PBBFAC,,, | Performed by: OBSTETRICS & GYNECOLOGY

## 2020-02-13 PROCEDURE — 81003 URINALYSIS AUTO W/O SCOPE: CPT

## 2020-02-13 PROCEDURE — 99213 OFFICE O/P EST LOW 20 MIN: CPT | Mod: PBBFAC | Performed by: OBSTETRICS & GYNECOLOGY

## 2020-02-13 PROCEDURE — 99215 PR OFFICE/OUTPT VISIT, EST, LEVL V, 40-54 MIN: ICD-10-PCS | Mod: S$PBB,,, | Performed by: OBSTETRICS & GYNECOLOGY

## 2020-02-13 PROCEDURE — 81000 URINALYSIS NONAUTO W/SCOPE: CPT

## 2020-02-13 RX ORDER — ACETAMINOPHEN 500 MG
1000 TABLET ORAL EVERY 6 HOURS PRN
Qty: 60 TABLET | Refills: 2 | Status: SHIPPED | OUTPATIENT
Start: 2020-02-13 | End: 2020-03-04

## 2020-02-13 RX ORDER — TRAMADOL HYDROCHLORIDE 50 MG/1
50 TABLET ORAL EVERY 6 HOURS
Qty: 15 TABLET | Refills: 0 | Status: SHIPPED | OUTPATIENT
Start: 2020-02-13

## 2020-02-13 RX ORDER — IBUPROFEN 600 MG/1
600 TABLET ORAL EVERY 6 HOURS PRN
Qty: 30 TABLET | Refills: 2 | Status: SHIPPED | OUTPATIENT
Start: 2020-02-13 | End: 2020-03-07

## 2020-02-13 NOTE — PROGRESS NOTES
REFERRING PROVIDER  No ref. provider found     REASON FOR CONSULT  Denisha Becker  is a 65 y.o.  woman who presents for evaluation of abdominal pain in the background of stage IA grade 3 endometrioid endometrial cancer, MMR proficient.    HISTORY OF PRESENT ILLNESS    Please refer below for the patient's tumor history.  The interval history is as follows:  Last week she experienced right lower quadrant pain.  It radiated to her right buttocks.  It was relieved by ibuprofen.  Has progressively worsened and woke her up in her sleep last night.  She rates it a 10/10, although she looks comfortable.  She has not tried Tylenol.  She denies any fevers, chills, night sweats, flank pain, abdominal hernias, inguinal hernias, vaginal bleeding, vaginal discharge, changes in her stool, changes in her urine, or recent episodes of diverticulitis.  She is tolerating p.o. without any nausea vomiting.  Her last bowel movement was this morning.  She regularly has intercourse without any complications.       Malignant neoplasm of endometrium    9/24/2019 Imaging Significant Findings     CT C/A/P Negative for metastases      9/25/2019 Tumor Markers      WNL      10/1/2019 Cancer Staged     Stage IA grade 3 endometrioid endometrial cancer status post total robotic hysterectomy, bilateral salpingo-oophorectomy, bilateral pelvic lymph node dissection, and omental biopsy.      Grade 3 endometrioid endometrial cancer.  Size of the cancer is unclear.  Myometrial invasion was 0.2/1.4 cm.  LVSI was absent.  Twenty-three pelvic lymph nodes were negative.  Omental biopsy was negative.     Cervix showed ERMIAS 3.      11/15/2019 - 11/20/2019 Chemotherapy     Treatment Summary   Plan Name: OP GYN PACLITAXEL CARBOPLATIN (AUC 6) Q3W  Treatment Goal: Curative  Status: Active  Start Date: 11/20/2019  End Date: 2/28/2020 (Planned)  Provider: Scottie Dumont MD  Chemotherapy: CARBOplatin (PARAPLATIN) 900 mg in sodium chloride 0.9% 250 mL chemo  infusion, 900 mg (100.9 % of original dose 892.2 mg), Intravenous, Clinic/HOD 1 time, 1 of 6 cycles  Dose modification:   (original dose 892.2 mg, Cycle 1)  Administration: 900 mg (11/20/2019)  PACLitaxel (TAXOL) 175 mg/m2 = 420 mg in sodium chloride 0.9% 500 mL chemo infusion, 175 mg/m2 = 420 mg, Intravenous, Clinic/HOD 1 time, 1 of 6 cycles  Administration: 420 mg (11/20/2019)    Patient experienced myalgia, arthralgia, and neuralgia and chose to discontinue chemotherapy despite being counseled on the benefits.          1/28/2020 -  Radiation Therapy     Treating physician: Lisa  Total Dose: 20 Gy  Fractions:           REVIEW OF SYSTEMS  All systems reviewed and negative except as noted in HPI.    OBJECTIVE   Vitals:    02/13/20 1416   BP: 134/63   Pulse: 63      Body mass index is 43.13 kg/m².      1. General: Well appearing, no apparent distress, alert and oriented.  2. Lymph: Neck symmetric without cervical or supraclavicular adenopathy or mass.  3. Lungs: Normal respiratory rate, no accessory muscle use.  4. Cardiac: Normal rate  5. Psych: Normal affect.  6. Abdomen:  non-distended, soft, non-tender, no guarding or rebound tenderness; no abdominal hernias near incision sites  7. Skin: Warm, dry, no rashes or lesions.   8. Extremities: Bilateral lower extremities without edema or tenderness.  9. Genitourinary               Pelvic Examination including:                a. External genitalia are normal in appearance. No lesions noted.  No inguinal lymphadenopathy or herniation.              b. Urethral meatus is normal size, location, and appearance.               c. Urethra is negative.               d. Bladder is nontender. No masses noted.               e. Vagina has normal mucosa with physiologic discharge. No lesions noted. Vaginal cuff is intact.              f. Uterus absent              g. Adnexa absent    ECOG status: 1    LABORATORY DATA  Lab data reviewed.    RADIOLOGICAL DATA  Radiology data  reviewed.    ASSESSMENT / PLAN     1. Lower abdominal pain    2. Malignant neoplasm of endometrium    3. ERMIAS III with severe dysplasia    4. Morbid obesity with BMI of 40.0-44.9, adult    5. History of diverticulitis       F/U UA, urine culture  F/U CBC, BMP  F/U CT A/P w/o contrast    Low suspicion that this is related to her surgery or endometrial cancer.  If the work-up is negative I would consider  a Pain/Palliative care consult as even her post-operative course was complicated by increased pain.  I worry about an underlying etiology.       PATIENT EDUCATION  Ready to learn, no apparent learning barriers were identified; learning preferences include listening. Explained diagnosis and treatment plan; patient expressed understanding of the content.    ADMINISTRATIVE BILLING  Greater than 50% of was spent in counseling.       Scottie Dumont

## 2020-02-13 NOTE — TELEPHONE ENCOUNTER
"Patient messaged via portal asking for return call from RN. Contacted patient who reports "pain where my ovaries used to be since last week." Reports taking Ibuprofen and using ice to help with pain. States pain woke her from sleep. Describes it as low pelvic pain extending around to her back.  RN offered patient appointment today for further evaluation/discussion. Patient accepted and voiced understanding.   "

## 2020-02-20 ENCOUNTER — TELEPHONE (OUTPATIENT)
Dept: RADIATION ONCOLOGY | Facility: CLINIC | Age: 65
End: 2020-02-20

## 2020-02-27 ENCOUNTER — PROCEDURE VISIT (OUTPATIENT)
Dept: RADIATION ONCOLOGY | Facility: CLINIC | Age: 65
End: 2020-02-27
Payer: MEDICAID

## 2020-02-27 ENCOUNTER — PATIENT MESSAGE (OUTPATIENT)
Dept: RADIATION ONCOLOGY | Facility: CLINIC | Age: 65
End: 2020-02-27

## 2020-02-27 DIAGNOSIS — C54.1 MALIGNANT NEOPLASM OF ENDOMETRIUM: Primary | ICD-10-CM

## 2020-02-27 PROCEDURE — 77770 HDR RDNCL NTRSTL/ICAV BRCHTX: CPT | Mod: TC | Performed by: RADIOLOGY

## 2020-02-27 PROCEDURE — 77300 RADIATION THERAPY DOSE PLAN: CPT | Mod: TC | Performed by: RADIOLOGY

## 2020-02-27 PROCEDURE — 77770 HDR RDNCL NTRSTL/ICAV BRCHTX: CPT | Mod: 26,,, | Performed by: RADIOLOGY

## 2020-02-27 PROCEDURE — C1717 BRACHYTX, NON-STR,HDR IR-192: HCPCS | Performed by: RADIOLOGY

## 2020-02-27 PROCEDURE — 77770 PR HDR RDNCL NTRSTL/ICAV BRCHTX 1 CH: ICD-10-PCS | Mod: 26,,, | Performed by: RADIOLOGY

## 2020-02-27 PROCEDURE — 57156 INS VAG BRACHYTX DEVICE: CPT | Mod: TC | Performed by: RADIOLOGY

## 2020-02-27 PROCEDURE — 57156 PR INSERT VAGINAL RADIATION DEVICE: ICD-10-PCS | Mod: ,,, | Performed by: RADIOLOGY

## 2020-02-27 PROCEDURE — 57156 INS VAG BRACHYTX DEVICE: CPT | Mod: ,,, | Performed by: RADIOLOGY

## 2020-02-27 NOTE — PROCEDURES
Procedures     PROCEDURE NOTE/ COMPLETION NOTE:         REFERRING PHYSICIAN: Scottie eBe M.D.     DIAGNOSIS: pT1a N0 M0, FIGO stage IA, endometrioid carcinoma, grade 3     Ms. Becker is a 64-year-old female who was recently diagnosed with endometrial cancer after evaluation for postmenopausal bleeding. A biopsy on August 19, 2019 was negative for malignancy. Endocervical curettage and biopsy on September 13, 2019 revealed fragments of carcinosarcoma, FIGO grade 3 with extensive necrosis. A CT of the chest, abdomen, and pelvis on September 25, 2019 was negative for metastatic disease or lymphadenopathy. However, the uterus appeared to be lobulated. On October 1, 2019, she underwent TAHBSO. Pathology revealed the uterus with high-grade endometrioid carcinoma, FIGO 3, with invasion of less than 15% of the myometrium (depth not reported). No lymphovascular invasion or cervical stromal involvement was noted. A mentum was negative for malignancy. 11/11 right pelvic lymph nodes and 12/12 left pelvic lymph nodes were negative for involvement. After immunohistochemistry testing, there is no loss of nuclear expression of MMR proteins with the low probability of microsatellite instability. She is recommended to undergo adjuvant chemotherapy and vaginal cuff brachytherapy to reduce her chance of recurrence.  However, after receiving the 1st cycle of chemotherapy she has decided to discontinue that.  She is here today for her 5th radiation treatment.      DATE OF PROCEDURE: 02/27/2020      PROCEDURE: Intracavitary vaginal HDR #5      AREA TREATED: Vaginal cuff and upper 5 cm of the vagina      TREATMENT METHOD: Insertion of 3.0 cm vaginal cylinder into vagina      RADIATION: Iridium 192, high-dose-rate      DEPTH OF CALCULATION:  vaginal (cylinder) surface      DOSE:  5 Gy      Time Out:   Performed by Pallavi Lester RN  Identifiers used: Name and date of birth      She was brought to the HDR delivery room and a 3.0 cm vaginal  cylinder was placed into the vagina. The brachytherapy catheter was connected to the cylinder and the treatment was delivered. She received the 5th and final fraction of HDR vaginal brachytherapy as above. She tolerated the treatment without any unexpected side effects. I was present during the entire procedure.     Of note, she complains of vaginal itching discharge after the 3rd insertion.. Physical exam noted to have candidiasis of the vagina. Prescription given for diflucan 100 mg x 3 days. Vaginal discharge and irritation is much improved.     COMPLETION NOTE:  DOSE:  2500 cGy in 5 fractions  AREA TREATED:  Vaginal cuff and upper 5 cm of the vagina  DURATION:  January 14, 2020 through February 27, 2020    Overall, she tolerated the radiation without any unexpected side effects.  She had a prolonged course of treatment due to multiple cancellation per patient.  Discharge instructions were given to the patient upon completion.  I plan to see her back in approximately 6 weeks, unless symptoms warrant otherwise.  She will continue follow-up with Dr. Dumont as planned.

## 2020-02-27 NOTE — NURSING
02/27/2020  Nurse: Pallavi Lester    Procedure: Vaginal Cylinder    1402: Patient arrived from Home.  Time out performed.    1403: Positioned on Stretcher in the Frog Leg position. Positioned with Knee Immobilizer by myself.    1405: 3.0 cm vaginal cylinder inserted.  HDR treatment given with full bladder.  Pt. Tolerated well.

## 2020-03-05 ENCOUNTER — PATIENT MESSAGE (OUTPATIENT)
Dept: RADIATION ONCOLOGY | Facility: CLINIC | Age: 65
End: 2020-03-05

## 2020-04-15 ENCOUNTER — TELEPHONE (OUTPATIENT)
Dept: GYNECOLOGIC ONCOLOGY | Facility: CLINIC | Age: 65
End: 2020-04-15

## 2020-04-15 NOTE — TELEPHONE ENCOUNTER
Spoke with our patient about her reschedule appointment in gyn oncology she agreed she voiced understanding of the date, time and location. All questions answered appointment mail. MA/VALENCIA /Preceptor Mac MAURO

## 2020-05-05 ENCOUNTER — PATIENT MESSAGE (OUTPATIENT)
Dept: GYNECOLOGIC ONCOLOGY | Facility: CLINIC | Age: 65
End: 2020-05-05

## 2020-05-15 ENCOUNTER — TELEPHONE (OUTPATIENT)
Dept: GYNECOLOGIC ONCOLOGY | Facility: CLINIC | Age: 65
End: 2020-05-15

## 2020-05-15 NOTE — TELEPHONE ENCOUNTER
Spoke with our patient she stated that she has insurance and will call back or upload it on [facility] because she did not have her card with her at this time. I  reschedule her appointment in gyn oncology per the patient request a new  Date/ time. All questions answered appointment mail. MA/VALENCIA /Preceptor Mac MAURO

## 2020-07-15 DIAGNOSIS — M25.572 LEFT ANKLE PAIN: Primary | ICD-10-CM

## 2020-07-16 ENCOUNTER — CLINICAL SUPPORT (OUTPATIENT)
Dept: REHABILITATION | Facility: OTHER | Age: 65
End: 2020-07-16
Payer: MEDICAID

## 2020-07-16 DIAGNOSIS — M25.561 CHRONIC PAIN OF RIGHT KNEE: Primary | ICD-10-CM

## 2020-07-16 DIAGNOSIS — M25.672 ANKLE STIFFNESS, LEFT: ICD-10-CM

## 2020-07-16 DIAGNOSIS — M25.572 CHRONIC PAIN OF LEFT ANKLE: ICD-10-CM

## 2020-07-16 DIAGNOSIS — G89.29 CHRONIC PAIN OF RIGHT KNEE: Primary | ICD-10-CM

## 2020-07-16 DIAGNOSIS — R29.898 WEAKNESS OF BOTH LOWER EXTREMITIES: ICD-10-CM

## 2020-07-16 DIAGNOSIS — G89.29 CHRONIC PAIN OF LEFT ANKLE: ICD-10-CM

## 2020-07-16 PROCEDURE — 97161 PT EVAL LOW COMPLEX 20 MIN: CPT | Mod: PN

## 2020-07-16 NOTE — PLAN OF CARE
[facility]  Physical Therapy Initial Evaluation    Date: 2020   Name: Denisha Becker  Clinic Number: [id number]    Therapy Diagnosis:   Encounter Diagnoses   Name Primary?    Chronic pain of right knee Yes    Chronic pain of left ankle     Weakness of both lower extremities     Ankle stiffness, left      Physician: Seth Price MD    Physician Orders: PT Eval and Treat   Medical Diagnosis from Referral: M25.572 (ICD-10-CM) - Left ankle pain  Evaluation Date: 2020  Authorization Period Expiration: 2020  Plan of Care Expiration: 10/16/2020  Visit # / Visits authorized:     Time In: 900  Time Out: 1000  Total Appointment Time (timed & untimed codes): 60 minutes    Precautions: Standard    Subjective   Date of onset: ~10 years  History of current condition - Denisha reports: she broke her L ankle approx 10 yrs ago, and has had intermittent pain since. Pt previously completed PT and reports that her pain is decreased and overall mobility is improved when she takes care of herself. She reports swelling and limping when she walks. Pain is located on L ankle anterior to medial malleolus. Pain is aching and worsens with standing to 8/10, after a long day of standing pain is located in Achilles, calcaneous, and WB is painful. She also reports pain in R knee, 3/10, at the patellar tendon, her main complaint fluctuates between the R knee and L ankle. She reports an overall decrease in mobility.      Medical History:   Past Medical History:   Diagnosis Date    Arthritis     right knee    Cancer     Diverticulitis        Surgical History:   Denisha Becker  has a past surgical history that includes  section, classic (); [place] tooth extraction; Hysteroscopy with dilation and curettage of uterus (N/A, 2019); Robot-assisted laparoscopic hysterectomy (N/A, 10/1/2019); Salpingoophorectomy (Bilateral, 10/1/2019); Robot-assisted laparoscopic pelvic lymphadenectomy  using da Yasir Xi (Bilateral, 10/1/2019); Robot-assisted laparoscopic lysis of adhesions using da Yasir Xi (10/1/2019); Laparotomy (N/A, 10/1/2019); and Biopsy of abdominal wall (10/1/2019).    Medications:   Denisha has a current medication list which includes the following prescription(s): cetirizine, clotrimazole, oxycodone, prochlorperazine, and tramadol. Pt reports not taking any medications at the moment besides naproxen prn     Allergies:   Review of patient's allergies indicates:  No Known Allergies     Imaging,bone scan films (2019): see Media for outside xray report  Prior Therapy: Attended PT at [facility] last year for 2x/wk for a few weeks. Pt reports PT made her much better in regards to pain and overall mobility.   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    Pain:  Current 0/10, worst 8/10, best 0/10   Location: left ankles   Description: Aching  Aggravating Factors: Standing and Walking  Easing Factors: relaxation, ice, rest and elevation    Patients goals: pt wants to improve on her overall ability to move with out pain. Stairs     Objective     Palpation: not TTP, swelling noted on L side    Hip Right  Left  Pain/Dysfunction with Movement    ROM MMT ROM MMT    Flexion WFL 4+ WFL 4    Abduction WFL 4+ WFL 4    Adduction WFL 5 WFL 5    Internal rotation WFL 4 WFL 4+ Pain   External rotation WFL 4 WFL 4         Knee Right  Left  Pain/Dysfunction with Movement    ROM MMT ROM MMT    Flexion WFL 5 WFL 5    Extension WFL 5 WFL 5          Ankle  Right   Left  Pain/Dysfunction with Movement    AROM PROM MMT AROM PROM MMT    Plantarflexion 40  4 55  4 p on L  With toe walking and AROM   Dorsiflexion  Knee ext 10 12 5 7 10 5    Inversion 22 25 5 5 10 4 Pain on L   Eversion 15 20 5 10 10 4      Great Toe:    -Flexion 15 degrees  -Extension 22 degrees    Flexibility:    Right Left  Hamstring (SLR)  WFL Decreased  Gastroc   WFL  Decreased  Soleus    WFL Decreased      Joint Mobility: Hypomobile intermetatarsal, talocrural      Special Tests:  Anterior Draw Sign: negative       Balance:  SL R: Poor   SLL: Poor    Stairs:  Ascending pt uses a reciprocal pattern and 1 HR for balance. Descending Pt uses a non reciprocal pattern with L leg leading d/t pain in the L foot, HR used for balance  Gait: Pt ambulated with antalgic gait pattern, decreased gait speed, decreased step length with RLE d/t decreased stance time on LLE w/ c/o pain in L ankle.     Limitation/Restriction for FOTO Ankle Survey    Therapist reviewed FOTO scores for Denisha Becker on 7/16/2020.   FOTO documents entered into EPIC - see Media section.    Limitation Score: 41%         Assessment   Denisha is a 65 y.o. female referred to outpatient Physical Therapy with a medical diagnosis of L ankle pain. Patient presents with decreased muscle strength in B hips and B ankles, decreased ankle ROM, decreased LE muscle flexibility, and poor balance. These deficits impair her ability to complete functional movement activities without pain, including: standing, walking, ascending/descending stairs.      Patient prognosis is Good.   Patientt will benefit from skilled outpatient Physical Therapy to address the deficits stated above and in the chart below, provide patient /family education, and to maximize patientt's level of independence.     Plan of care discussed with patient: Yes  Patient's spiritual, cultural and educational needs considered and patient is agreeable to the plan of care and goals as stated below:     Anticipated Barriers for therapy: Chronicity of condition    Medical Necessity is demonstrated by the following  History  Co-morbidities and personal factors that may impact the plan of care Co-morbidities:   high BMI, history of cancer and transportation  assistance required    Personal Factors:   no deficits     moderate   Examination  Body Structures and Functions, activity limitations and participation restrictions that may impact the plan of care Body Regions:   lower extremities    Body Systems:    ROM  strength  balance  gait  transfers  edema    Participation Restrictions:   No deficit    Activity limitations:   Learning and applying knowledge  no deficits    General Tasks and Commands  no deficits    Communication  no deficits    Mobility  lifting and carrying objects  walking  using transportation (bus, train, plane, car)    Self care  no deficits    Domestic Life  shopping  cooking  doing house work (cleaning house, washing dishes, laundry)    Interactions/Relationships  no deficits    Life Areas  employment    Community and Social Life  community life  recreation and leisure         moderate   Clinical Presentation stable and uncomplicated low   Decision Making/ Complexity Score: low     Goals:  Short Term Goals: 6 weeks   1. Pt will be independent with HEP and self management of symptoms  2. Pt will be able to climb 6 steps with reciprocal gait pattern, 1 HR, with <4/10 pain to improve ability to navigate her home.  3. Pt will be able to ambulate 300' without gait deviations and <4/10 pain.  4. Pt to demo improved functional ability with FOTO limitation <=30% disability.       Long Term Goals: 12 weeks   1. Pt will be able to walk >= 30 minutes with household chores with <3/10 pain.  2. Pt will be able to ascend/descend 1 flight of stairs with reciprocal gait pattern.   3. Pt will improve Hip and ankle strength to 5/5 bilaterally.  4. Pt to demonstrate improved functional ability with FOTO limitation <= 20%.    Plan   Plan of care Certification: 7/16/2020 to 10/16/2020.    Outpatient Physical Therapy 2 times weekly for 12 weeks to include the following interventions: Gait Training, Manual Therapy, Moist Heat/ Ice, Neuromuscular Re-ed, Patient Education,  Therapeutic Activites and Therapeutic Exercise.     Jean Wadsworth, PT  7/16/2020    Co-signed: Rosalie Adair, PT

## 2020-07-27 ENCOUNTER — CLINICAL SUPPORT (OUTPATIENT)
Dept: REHABILITATION | Facility: OTHER | Age: 65
End: 2020-07-27
Payer: MEDICAID

## 2020-07-27 DIAGNOSIS — G89.29 CHRONIC PAIN OF RIGHT KNEE: ICD-10-CM

## 2020-07-27 DIAGNOSIS — M25.672 ANKLE STIFFNESS, LEFT: ICD-10-CM

## 2020-07-27 DIAGNOSIS — G89.29 CHRONIC PAIN OF LEFT ANKLE: ICD-10-CM

## 2020-07-27 DIAGNOSIS — R29.898 WEAKNESS OF BOTH LOWER EXTREMITIES: ICD-10-CM

## 2020-07-27 DIAGNOSIS — M25.561 CHRONIC PAIN OF RIGHT KNEE: ICD-10-CM

## 2020-07-27 DIAGNOSIS — M25.572 CHRONIC PAIN OF LEFT ANKLE: ICD-10-CM

## 2020-07-27 PROCEDURE — 97110 THERAPEUTIC EXERCISES: CPT | Mod: PN,CQ

## 2020-07-27 NOTE — PROGRESS NOTES
Physical Therapy Daily Treatment Note     Name: Denisha Becker  Clinic Number: [id number]    Therapy Diagnosis:   Encounter Diagnoses   Name Primary?    Chronic pain of right knee     Chronic pain of left ankle     Weakness of both lower extremities     Ankle stiffness, left      Physician: Seth Price MD    Visit Date: 7/27/2020    Physician Orders: PT Eval and Treat   Medical Diagnosis from Referral: M25.572 (ICD-10-CM) - Left ankle pain  Evaluation Date: 7/16/2020  Authorization Period Expiration: 8/16/2020  Plan of Care Expiration: 10/16/2020  Visit # / Visits authorized: 3/ 1    Time In: 1516  Time Out: 1616  Total Billable Time: 60  minutes    Precautions: Standard    Subjective     Pt reports: she is feeling better. States she is not having any pain today.   She was compliant with home exercise program.  Response to previous treatment: tolerated well  Functional change: none yet    Prior Level of Function: Independent  Current Level of Function: Decreased overall mobility d/t pain.     Pain:  Current 0/10, worst 8/10, best 0/10   Location: left ankles   Description: Aching  Aggravating Factors: Standing and Walking  Easing Factors: relaxation, ice, rest and elevation     Patients goals: pt wants to improve on her overall ability to move with out pain. Stairs       Objective     Denisha received therapeutic exercises to develop strength, endurance, ROM and flexibility for 45 minutes including:    GS slant board 2'  HR 20x  [place] pickup  Ankle T-band 4way OTB 30x  LAQ 30x 2#  SLR 20x  SL hip abd 20x   Clams 20x  Shuttle 50# 20x      Denisha received hot pack for 00 minutes to 00.    Denisha received cold pack for 10  minutes to L ankle for decreased soreness/swelling 1 pillowcase/long sitting  .    **Taken at Initial Evaluation**                7/16/2020       Hip Right   Left   Pain/Dysfunction with Movement     ROM MMT ROM MMT     Flexion WFL 4+ WFL 4     Abduction WFL 4+ WFL 4     Adduction WFL 5 WFL 5      Internal rotation WFL 4 WFL 4+ Pain   External rotation WFL 4 WFL 4           Knee Right   Left   Pain/Dysfunction with Movement     ROM MMT ROM MMT     Flexion WFL 5 WFL 5     Extension WFL 5 WFL 5              Ankle   Right     Left   Pain/Dysfunction with Movement     AROM PROM MMT AROM PROM MMT     Plantarflexion 40   4 55   4 p on L  With toe walking and AROM   Dorsiflexion  Knee ext 10 12 5 7 10 5     Inversion 22 25 5 5 10 4 Pain on L   Eversion 15 20 5 10 10 4            Home Exercises Provided and Patient Education Provided     Education provided:   - HEP/POC and gait mechanics    Written Home Exercises Provided: yes.  Exercises were reviewed and Denisha was able to demonstrate them prior to the end of the session.  Denisha demonstrated good  understanding of the education provided.     See EMR under Patient Instructions for exercises provided 7/27/2020.    Assessment     Pt tolerated exercise well. Progressed to ankle strengthening exercises and flexibility to improve muscle strength and ROM. Weakness in hip musculature is noted during SL/supine exercises.  Pt requested cryo/ice post.     Denisha is progressing well towards her goals.    Patient prognosis is Good.     Pt will continue to benefit from skilled outpatient physical therapy to address the deficits listed in the problem list box on initial evaluation, provide pt/family education and to maximize pt's level of independence in the home and community environment.     Pt's spiritual, cultural and educational needs considered and pt agreeable to plan of care and goals.     Anticipated Barriers for therapy: Chronicity of condition      Goals:  Short Term Goals: 6 weeks   1. Pt will be independent with HEP and self management of symptoms  2. Pt will be able to climb 6 steps with reciprocal gait pattern, 1 HR, with <4/10 pain to improve ability to navigate her home.  3. Pt will be able to ambulate 300' without gait deviations and <4/10 pain.  4. Pt to demo  improved functional ability with FOTO limitation <=30% disability.         Long Term Goals: 12 weeks   1. Pt will be able to walk >= 30 minutes with household chores with <3/10 pain.  2. Pt will be able to ascend/descend 1 flight of stairs with reciprocal gait pattern.   3. Pt will improve Hip and ankle strength to 5/5 bilaterally.  4. Pt to demonstrate improved functional ability with FOTO limitation <= 20%.      Plan     Plan of care Certification: 7/16/2020 to 10/16/2020.    Continue with current POC for further strengthening, flexibility, improve ROM and ADL performance. Will progress there-ex as tolerated.        Outpatient Physical Therapy 2 times weekly for 12 weeks to include the following interventions: Gait Training, Manual Therapy, Moist Heat/ Ice, Neuromuscular Re-ed, Patient Education, Therapeutic Activites and Therapeutic Exercise.       Ivan Purvis, PTA

## 2020-08-03 ENCOUNTER — CLINICAL SUPPORT (OUTPATIENT)
Dept: REHABILITATION | Facility: OTHER | Age: 65
End: 2020-08-03
Payer: MEDICAID

## 2020-08-03 DIAGNOSIS — M25.561 CHRONIC PAIN OF RIGHT KNEE: ICD-10-CM

## 2020-08-03 DIAGNOSIS — R29.898 WEAKNESS OF BOTH LOWER EXTREMITIES: ICD-10-CM

## 2020-08-03 DIAGNOSIS — M25.572 CHRONIC PAIN OF LEFT ANKLE: ICD-10-CM

## 2020-08-03 DIAGNOSIS — G89.29 CHRONIC PAIN OF LEFT ANKLE: ICD-10-CM

## 2020-08-03 DIAGNOSIS — G89.29 CHRONIC PAIN OF RIGHT KNEE: ICD-10-CM

## 2020-08-03 DIAGNOSIS — M25.672 ANKLE STIFFNESS, LEFT: ICD-10-CM

## 2020-08-03 PROCEDURE — 97110 THERAPEUTIC EXERCISES: CPT | Mod: PN,CQ

## 2020-08-03 NOTE — PROGRESS NOTES
Physical Therapy Daily Treatment Note     Name: Denisha Becker  Clinic Number: [id number]    Therapy Diagnosis:   Encounter Diagnoses   Name Primary?    Chronic pain of right knee     Chronic pain of left ankle     Weakness of both lower extremities     Ankle stiffness, left      Physician: Seth Price MD    Visit Date: 8/3/2020    Physician Orders: PT Eval and Treat   Medical Diagnosis from Referral: M25.572 (ICD-10-CM) - Left ankle pain  Evaluation Date: 7/16/2020  Authorization Period Expiration: 8/16/2020  Plan of Care Expiration: 10/16/2020  Visit # / Visits authorized: 4/ 24    Time In: 1030  Time Out: 1115  Total Billable Time: 45  minutes    Precautions: Standard    Subjective     Pt reports: she is feeling better. States she is performing her HEP as directed and feels like its helping.    She was compliant with home exercise program.  Response to previous treatment: tolerated well  Functional change: none yet    Prior Level of Function: Independent  Current Level of Function: Decreased overall mobility d/t pain.     Pain:  Current 0/10, worst 8/10, best 0/10   Location: left ankles   Description: Aching  Aggravating Factors: Standing and Walking  Easing Factors: relaxation, ice, rest and elevation     Patients goals: pt wants to improve on her overall ability to move with out pain. Stairs       Objective     Denisha received therapeutic exercises to develop strength, endurance, ROM and flexibility for 45 minutes including:    GS slant board 2'  HR 30x  [place] pickup  Archisizer 2'  Ankle T-band 4way OTB 30x  LAQ 30x 2#  SAQ 30x 2#  SLR 30x  SL hip abd 20x   Clams 20x  Shuttle 50# 30x      Denisha received hot pack for 00 minutes to 00.    Denisha received cold pack for 00  minutes to L ankle for decreased soreness/swelling 1 pillowcase/long sitting- NT  .    **Taken at Initial Evaluation**                7/16/2020       Hip Right   Left   Pain/Dysfunction with Movement     ROM MMT ROM MMT     Flexion WFL  4+ WFL 4     Abduction WFL 4+ WFL 4     Adduction WFL 5 WFL 5     Internal rotation WFL 4 WFL 4+ Pain   External rotation WFL 4 WFL 4           Knee Right   Left   Pain/Dysfunction with Movement     ROM MMT ROM MMT     Flexion WFL 5 WFL 5     Extension WFL 5 WFL 5              Ankle   Right     Left   Pain/Dysfunction with Movement     AROM PROM MMT AROM PROM MMT     Plantarflexion 40   4 55   4 p on L  With toe walking and AROM   Dorsiflexion  Knee ext 10 12 5 7 10 5     Inversion 22 25 5 5 10 4 Pain on L   Eversion 15 20 5 10 10 4            Home Exercises Provided and Patient Education Provided     Education provided:   - HEP/POC and gait mechanics    Written Home Exercises Provided: yes.  Exercises were reviewed and Denisha was able to demonstrate them prior to the end of the session.  Denisha demonstrated good  understanding of the education provided.     See EMR under Patient Instructions for exercises provided 7/27/2020.    Assessment     Pt tolerated exercise well. Progressed to ankle strengthening exercises and flexibility to improve muscle strength and ROM. Pt was able to complete increased repetitions of exercise with slightly decreased periods of rest between sets.     Denisha is progressing well towards her goals.    Patient prognosis is Good.     Pt will continue to benefit from skilled outpatient physical therapy to address the deficits listed in the problem list box on initial evaluation, provide pt/family education and to maximize pt's level of independence in the home and community environment.     Pt's spiritual, cultural and educational needs considered and pt agreeable to plan of care and goals.     Anticipated Barriers for therapy: Chronicity of condition      Goals:  Short Term Goals: 6 weeks   1. Pt will be independent with HEP and self management of symptoms  2. Pt will be able to climb 6 steps with reciprocal gait pattern, 1 HR, with <4/10 pain to improve ability to navigate her home.  3. Pt will  be able to ambulate 300' without gait deviations and <4/10 pain.  4. Pt to demo improved functional ability with FOTO limitation <=30% disability.         Long Term Goals: 12 weeks   1. Pt will be able to walk >= 30 minutes with household chores with <3/10 pain.  2. Pt will be able to ascend/descend 1 flight of stairs with reciprocal gait pattern.   3. Pt will improve Hip and ankle strength to 5/5 bilaterally.  4. Pt to demonstrate improved functional ability with FOTO limitation <= 20%.      Plan     Plan of care Certification: 7/16/2020 to 10/16/2020.    Continue with current POC for further strengthening, flexibility, improve ROM and ADL performance. Will progress there-ex as tolerated.        Outpatient Physical Therapy 2 times weekly for 12 weeks to include the following interventions: Gait Training, Manual Therapy, Moist Heat/ Ice, Neuromuscular Re-ed, Patient Education, Therapeutic Activites and Therapeutic Exercise.       Ivan Purvis, PTA

## 2020-08-06 ENCOUNTER — CLINICAL SUPPORT (OUTPATIENT)
Dept: REHABILITATION | Facility: OTHER | Age: 65
End: 2020-08-06
Payer: MEDICAID

## 2020-08-06 DIAGNOSIS — G89.29 CHRONIC PAIN OF LEFT ANKLE: ICD-10-CM

## 2020-08-06 DIAGNOSIS — M25.572 CHRONIC PAIN OF LEFT ANKLE: ICD-10-CM

## 2020-08-06 DIAGNOSIS — G89.29 CHRONIC PAIN OF RIGHT KNEE: ICD-10-CM

## 2020-08-06 DIAGNOSIS — M25.672 ANKLE STIFFNESS, LEFT: ICD-10-CM

## 2020-08-06 DIAGNOSIS — R29.898 WEAKNESS OF BOTH LOWER EXTREMITIES: ICD-10-CM

## 2020-08-06 DIAGNOSIS — M25.561 CHRONIC PAIN OF RIGHT KNEE: ICD-10-CM

## 2020-08-06 PROCEDURE — 97110 THERAPEUTIC EXERCISES: CPT | Mod: PN,CQ

## 2020-08-06 NOTE — PROGRESS NOTES
Physical Therapy Daily Treatment Note     Name: Denisha Becker  Clinic Number: [id number]    Therapy Diagnosis:   Encounter Diagnoses   Name Primary?    Chronic pain of right knee     Chronic pain of left ankle     Weakness of both lower extremities     Ankle stiffness, left      Physician: Seth Price MD    Visit Date: 8/6/2020    Physician Orders: PT Eval and Treat   Medical Diagnosis from Referral: M25.572 (ICD-10-CM) - Left ankle pain  Evaluation Date: 7/16/2020  Authorization Period Expiration: 8/16/2020  Plan of Care Expiration: 10/16/2020  Visit # / Visits authorized: 5/ 24    Time In: 1125  Time Out: 1200  Total Billable Time: 35 minutes    Precautions: Standard    Subjective     Pt states feeling well w/ no c/o pn in L ankle   She was compliant with home exercise program.  Response to previous treatment: no adverse effects  Functional change: no change     Prior Level of Function: Independent  Current Level of Function: Decreased overall mobility d/t pain.     Pain:  Current 0/10, worst 8/10, best 0/10   Location: left ankles   Description: Aching  Aggravating Factors: Standing and Walking  Easing Factors: relaxation, ice, rest and elevation     Patients goals: pt wants to improve on her overall ability to move with out pain. Stairs       Objective     Denisha received therapeutic exercises to develop strength, endurance, ROM and flexibility for 35 minutes including:    GS slant board 1'  R Bike 5 min to increase blood flow   [place] pickup  HR 30x  Shuttle 50# 30x  LAQ 3 x 10 3# B   SLR 30x  SL hip abd 30x    SAQ 30x 2#  Clams 20x  Archisizer 2'  Ankle T-band 4way OTB 30x    Dneisha received hot pack for 00 minutes to 00.    Denisha received cold pack for 00  minutes to L ankle for decreased soreness/swelling 1 pillowcase/long sitting- NT  .    Home Exercises Provided and Patient Education Provided     Education provided:   - Pt edu on proper exercise technique.      Written Home Exercises Provided:  yes.  Exercises were reviewed and Denisha was able to demonstrate them prior to the end of the session.  Denisha demonstrated good  understanding of the education provided.     See EMR under Patient Instructions for exercises provided 7/27/2020.    Assessment   Pt arrived 5 min late to tx today.  Pt showed increased strength and endurance during therex.  Pt cont to have quad, glute and ankle weakness.  Pt had no adverse effects from tx today.      Denisha is progressing well towards her goals.    Patient prognosis is Good.     Pt will continue to benefit from skilled outpatient physical therapy to address the deficits listed in the problem list box on initial evaluation, provide pt/family education and to maximize pt's level of independence in the home and community environment.     Pt's spiritual, cultural and educational needs considered and pt agreeable to plan of care and goals.     Anticipated Barriers for therapy: Chronicity of condition      Goals:  Short Term Goals: 6 weeks   1. Pt will be independent with HEP and self management of symptoms ( progressing, not met)   2. Pt will be able to climb 6 steps with reciprocal gait pattern, 1 HR, with <4/10 pain to improve ability to navigate her home.( progressing, not met)   3. Pt will be able to ambulate 300' without gait deviations and <4/10 pain.( progressing, not met)   4. Pt to demo improved functional ability with FOTO limitation <=30% disability. ( progressing, not met)         Long Term Goals: 12 weeks   1. Pt will be able to walk >= 30 minutes with household chores with <3/10 pain.( progressing, not met)   2. Pt will be able to ascend/descend 1 flight of stairs with reciprocal gait pattern. ( progressing, not met)   3. Pt will improve Hip and ankle strength to 5/5 bilaterally.( progressing, not met)   4. Pt to demonstrate improved functional ability with FOTO limitation <= 20%.( progressing, not met)       Plan     Cont to progress towards goals set by PT.  Cont  to increase quad and ankle strength next visit.        Mauricio Williamson, PTA

## 2020-08-11 ENCOUNTER — CLINICAL SUPPORT (OUTPATIENT)
Dept: REHABILITATION | Facility: OTHER | Age: 65
End: 2020-08-11
Payer: MEDICAID

## 2020-08-11 DIAGNOSIS — G89.29 CHRONIC PAIN OF LEFT ANKLE: ICD-10-CM

## 2020-08-11 DIAGNOSIS — M25.561 CHRONIC PAIN OF RIGHT KNEE: Primary | ICD-10-CM

## 2020-08-11 DIAGNOSIS — M25.572 CHRONIC PAIN OF LEFT ANKLE: ICD-10-CM

## 2020-08-11 DIAGNOSIS — R29.898 WEAKNESS OF BOTH LOWER EXTREMITIES: ICD-10-CM

## 2020-08-11 DIAGNOSIS — G89.29 CHRONIC PAIN OF RIGHT KNEE: Primary | ICD-10-CM

## 2020-08-11 DIAGNOSIS — M25.672 ANKLE STIFFNESS, LEFT: ICD-10-CM

## 2020-08-11 PROCEDURE — 97110 THERAPEUTIC EXERCISES: CPT | Mod: PN

## 2020-08-11 NOTE — PROGRESS NOTES
"    Physical Therapy Daily Treatment Note     Name: Denisha Becker  Clinic Number: [id number]    Therapy Diagnosis:   Encounter Diagnoses   Name Primary?    Chronic pain of right knee Yes    Chronic pain of left ankle     Weakness of both lower extremities     Ankle stiffness, left      Physician: Seth Price MD    Visit Date: 8/11/2020    Physician Orders: PT Eval and Treat   Medical Diagnosis from Referral: M25.572 (ICD-10-CM) - Left ankle pain  Evaluation Date: 7/16/2020  Authorization Period Expiration: 8/16/2020  Plan of Care Expiration: 10/16/2020  Visit # / Visits authorized: 6/ 24    Time In: 10:30  Time Out: 11:15  Total Billable Time: 45 minutes    Precautions: Standard    Subjective     Pt states decreased swelling in her ankle. She hasn't had pain recently in her ankle or knees. She states she hasn't tried walking longer distances yet.   She was compliant with home exercise program.  Response to previous treatment: no adverse effects  Functional change: no change     Prior Level of Function: Independent  Current Level of Function: Decreased overall mobility d/t pain.     Pain:  Current 0/10, worst 8/10, best 0/10   Location: left ankles   Description: Aching  Aggravating Factors: Standing and Walking  Easing Factors: relaxation, ice, rest and elevation     Patients goals: pt wants to improve on her overall ability to move with out pain. Stairs       Objective     Denisha received therapeutic exercises to develop strength, endurance, ROM and flexibility for 35 minutes including:    GS slant board 1'  R Bike 5 min to increase blood flow   [place] pickup  HR 30x  Shuttle 50# 30x  LAQ 3 x 10 3# B   SLR 2# 3x 10  SL hip abd 2# 3x100  Bridges 3x10  Toe Yoga 1 min  Step ups onto foam surface 45"  DLS on foam surface 30"x2    SAQ 30x 2#  Clams 20x  Archisizer 2'  Ankle T-band 4way OTB 30x    Denisha received hot pack for 00 minutes to 00.    Denisha received cold pack for 00  minutes to L ankle for decreased " soreness/swelling 1 pillowcase/long sitting- NT  .    Home Exercises Provided and Patient Education Provided     Education provided:   - Pt edu on proper exercise technique.      Written Home Exercises Provided: yes.  Exercises were reviewed and Denisha was able to demonstrate them prior to the end of the session.  Denisha demonstrated good  understanding of the education provided.     See EMR under Patient Instructions for exercises provided 7/27/2020.    Assessment   Pt with improved strength during therex today, LE exercises progressed and weight increased. She cont to have c/o balance problems when ambulating. Worked on balance with dynamic surface. Educated patient on not practicing at home yet d/t safety concerns.     Denisha is progressing well towards her goals.    Patient prognosis is Good.     Pt will continue to benefit from skilled outpatient physical therapy to address the deficits listed in the problem list box on initial evaluation, provide pt/family education and to maximize pt's level of independence in the home and community environment.     Pt's spiritual, cultural and educational needs considered and pt agreeable to plan of care and goals.     Anticipated Barriers for therapy: Chronicity of condition      Goals:  Short Term Goals: 6 weeks   1. Pt will be independent with HEP and self management of symptoms ( progressing, not met)   2. Pt will be able to climb 6 steps with reciprocal gait pattern, 1 HR, with <4/10 pain to improve ability to navigate her home.( progressing, not met)   3. Pt will be able to ambulate 300' without gait deviations and <4/10 pain.( progressing, not met)   4. Pt to demo improved functional ability with FOTO limitation <=30% disability. ( progressing, not met)         Long Term Goals: 12 weeks   1. Pt will be able to walk >= 30 minutes with household chores with <3/10 pain.( progressing, not met)   2. Pt will be able to ascend/descend 1 flight of stairs with reciprocal gait  pattern. ( progressing, not met)   3. Pt will improve Hip and ankle strength to 5/5 bilaterally.( progressing, not met)   4. Pt to demonstrate improved functional ability with FOTO limitation <= 20%.( progressing, not met)       Plan     Cont to progress towards goals set by PT.  Cont to increase quad and ankle strength next visit.        Jean Wadsworth, PT   8/11/2020

## 2020-08-18 ENCOUNTER — CLINICAL SUPPORT (OUTPATIENT)
Dept: REHABILITATION | Facility: OTHER | Age: 65
End: 2020-08-18
Payer: MEDICAID

## 2020-08-18 DIAGNOSIS — M25.672 ANKLE STIFFNESS, LEFT: ICD-10-CM

## 2020-08-18 DIAGNOSIS — G89.29 CHRONIC PAIN OF RIGHT KNEE: Primary | ICD-10-CM

## 2020-08-18 DIAGNOSIS — M25.561 CHRONIC PAIN OF RIGHT KNEE: Primary | ICD-10-CM

## 2020-08-18 DIAGNOSIS — M25.572 CHRONIC PAIN OF LEFT ANKLE: ICD-10-CM

## 2020-08-18 DIAGNOSIS — R29.898 WEAKNESS OF BOTH LOWER EXTREMITIES: ICD-10-CM

## 2020-08-18 DIAGNOSIS — G89.29 CHRONIC PAIN OF LEFT ANKLE: ICD-10-CM

## 2020-08-18 PROCEDURE — 97110 THERAPEUTIC EXERCISES: CPT | Mod: PN

## 2020-08-18 NOTE — PROGRESS NOTES
"    Physical Therapy Daily Treatment Note     Name: Denisha Becker  Clinic Number: [id number]    Therapy Diagnosis:   Encounter Diagnoses   Name Primary?    Chronic pain of right knee Yes    Chronic pain of left ankle     Weakness of both lower extremities     Ankle stiffness, left      Physician: Seth Price MD    Visit Date: 8/18/2020    Physician Orders: PT Eval and Treat   Medical Diagnosis from Referral: M25.572 (ICD-10-CM) - Left ankle pain  Evaluation Date: 7/16/2020  Authorization Period Expiration: 8/16/2020  Plan of Care Expiration: 10/16/2020  Visit # / Visits authorized: 7/ 24    Time In: 10:30  Time Out: 11:15  Total Billable Time: 45 minutes    Precautions: Standard    Subjective     Pt states slight pain in ankle today.   She was compliant with home exercise program.  Response to previous treatment: no adverse effects  Functional change: no change     Prior Level of Function: Independent  Current Level of Function: Decreased overall mobility d/t pain.     Pain:  Current 2/10, worst 8/10, best 0/10   Location: left ankles   Description: Aching  Aggravating Factors: Standing and Walking  Easing Factors: relaxation, ice, rest and elevation     Patients goals: pt wants to improve on her overall ability to move with out pain. Stairs       Objective     Denisha received therapeutic exercises to develop strength, endurance, ROM and flexibility for 35 minutes including:    GS slant board 1'  R Bike 8 min to increase blood flow   [place] pickup  HR 30x  Shuttle 50# 30x  LAQ 3 x 10 3# B   SLR 2# 3x 10  SL hip abd 2# 3x10  Bridges 3x10  Toe Yoga 1 min  Step ups onto 6" step 2x15  SLS on foam surface 20"x3 Sergey  BAPS board front/back & side/side 2x10    SAQ 30x 2#  Clams 20x  Archisizer 2'  Ankle T-band 4way OTB 30x    Denisha received hot pack for 00 minutes to 00.    Denisha received cold pack for 00  minutes to L ankle for decreased soreness/swelling 1 pillowcase/long sitting- NT  .    Home Exercises Provided and " Patient Education Provided     Education provided:   - Pt edu on proper exercise technique.      Written Home Exercises Provided: yes.  Exercises were reviewed and Denisha was able to demonstrate them prior to the end of the session.  Denisha demonstrated good  understanding of the education provided.     See EMR under Patient Instructions for exercises provided 7/27/2020.    Assessment   Pt reports ankle pain this weekend after dancing for >1 hour, states she iced and elevated after that and her symptoms were relieved. She states that overall she is seeing progress with her ankle and overall strength. Cont progressing with LE strength and dynamic balance exercises.    Denisha is progressing well towards her goals.    Patient prognosis is Good.     Pt will continue to benefit from skilled outpatient physical therapy to address the deficits listed in the problem list box on initial evaluation, provide pt/family education and to maximize pt's level of independence in the home and community environment.     Pt's spiritual, cultural and educational needs considered and pt agreeable to plan of care and goals.     Anticipated Barriers for therapy: Chronicity of condition      Goals:  Short Term Goals: 6 weeks   1. Pt will be independent with HEP and self management of symptoms ( progressing, not met)   2. Pt will be able to climb 6 steps with reciprocal gait pattern, 1 HR, with <4/10 pain to improve ability to navigate her home.( progressing, not met)   3. Pt will be able to ambulate 300' without gait deviations and <4/10 pain.( progressing, not met)   4. Pt to demo improved functional ability with FOTO limitation <=30% disability. ( progressing, not met)         Long Term Goals: 12 weeks   1. Pt will be able to walk >= 30 minutes with household chores with <3/10 pain.( progressing, not met)   2. Pt will be able to ascend/descend 1 flight of stairs with reciprocal gait pattern. ( progressing, not met)   3. Pt will improve Hip  and ankle strength to 5/5 bilaterally.( progressing, not met)   4. Pt to demonstrate improved functional ability with FOTO limitation <= 20%.( progressing, not met)       Plan     Cont to progress towards goals set by PT.  Cont to increase quad and ankle strength next visit.        Jean Wadsworth, PT   8/18/2020

## 2020-08-20 ENCOUNTER — CLINICAL SUPPORT (OUTPATIENT)
Dept: REHABILITATION | Facility: OTHER | Age: 65
End: 2020-08-20
Payer: MEDICAID

## 2020-08-20 DIAGNOSIS — G89.29 CHRONIC PAIN OF RIGHT KNEE: ICD-10-CM

## 2020-08-20 DIAGNOSIS — G89.29 CHRONIC PAIN OF LEFT ANKLE: ICD-10-CM

## 2020-08-20 DIAGNOSIS — M25.572 CHRONIC PAIN OF LEFT ANKLE: ICD-10-CM

## 2020-08-20 DIAGNOSIS — M25.672 ANKLE STIFFNESS, LEFT: ICD-10-CM

## 2020-08-20 DIAGNOSIS — M25.561 CHRONIC PAIN OF RIGHT KNEE: ICD-10-CM

## 2020-08-20 DIAGNOSIS — R29.898 WEAKNESS OF BOTH LOWER EXTREMITIES: ICD-10-CM

## 2020-08-20 PROCEDURE — 97110 THERAPEUTIC EXERCISES: CPT | Mod: PN,CQ

## 2020-08-20 NOTE — PROGRESS NOTES
"    Physical Therapy Daily Treatment Note     Name: Denisha Becker  Clinic Number: [id number]    Therapy Diagnosis:   Encounter Diagnoses   Name Primary?    Chronic pain of right knee     Chronic pain of left ankle     Weakness of both lower extremities     Ankle stiffness, left      Physician: Seth Price MD    Visit Date: 8/20/2020    Physician Orders: PT Eval and Treat   Medical Diagnosis from Referral: M25.572 (ICD-10-CM) - Left ankle pain  Evaluation Date: 7/16/2020  Authorization Period Expiration: 8/16/2020  Plan of Care Expiration: 10/16/2020  Visit # / Visits authorized: 8/ 24    Time In: 1015 (pt arrived on time and was seen, was not check in by office until later)  Time Out:  1100  Total Billable Time: 45 minutes    Precautions: Standard    Subjective     Pt states she is feeling better. States she is getting less pain in her ankles.   She was compliant with home exercise program.  Response to previous treatment: no adverse effects  Functional change: no change     Prior Level of Function: Independent  Current Level of Function: Decreased overall mobility d/t pain.     Pain:  Current 1/10, worst 8/10, best 0/10   Location: left ankles   Description: Aching  Aggravating Factors: Standing and Walking  Easing Factors: relaxation, ice, rest and elevation     Patients goals: pt wants to improve on her overall ability to move with out pain. Stairs       Objective     Denisha received therapeutic exercises to develop strength, endurance, ROM and flexibility for 45 minutes including:    GS slant board 1'  R Bike 8 min to increase blood flow   [place] pickup  HR 30x  Shuttle 50# 30x  LAQ 3 x 10 3# B   SLR 2# 3x 10  SL hip abd 2# 3x10  Bridges 3x10   Toe Yoga 1 min  Step ups onto 6" step 2x15  SLS on foam surface 20"x3 Sergey  BAPS board front/back & side/side 2x10    SAQ 30x 2#  Clams 20x  Archisizer 2'  Ankle T-band 4way OTB 30x    Denisha received hot pack for 00 minutes to 00.    Denisha received cold pack for 00  " minutes to L ankle for decreased soreness/swelling 1 pillowcase/long sitting- NT  .    Home Exercises Provided and Patient Education Provided     Education provided:   - Pt edu on proper exercise technique.      Written Home Exercises Provided: yes.  Exercises were reviewed and Denisha was able to demonstrate them prior to the end of the session.  Denisha demonstrated good  understanding of the education provided.     See EMR under Patient Instructions for exercises provided 7/27/2020.    Assessment   Pt tolerated exercise well. Endurance appeared improved with hip exercises with slightly decreased periods of rest between sets. Weakness in ankles/great toe extensors continues to be present at this time.  Pt requires verbal/tactile cues to initiate exercise and to correct form.      Denisha is progressing well towards her goals.    Patient prognosis is Good.     Pt will continue to benefit from skilled outpatient physical therapy to address the deficits listed in the problem list box on initial evaluation, provide pt/family education and to maximize pt's level of independence in the home and community environment.     Pt's spiritual, cultural and educational needs considered and pt agreeable to plan of care and goals.     Anticipated Barriers for therapy: Chronicity of condition      Goals:  Short Term Goals: 6 weeks   1. Pt will be independent with HEP and self management of symptoms ( progressing, not met)   2. Pt will be able to climb 6 steps with reciprocal gait pattern, 1 HR, with <4/10 pain to improve ability to navigate her home.( progressing, not met)   3. Pt will be able to ambulate 300' without gait deviations and <4/10 pain.( progressing, not met)   4. Pt to demo improved functional ability with FOTO limitation <=30% disability. ( progressing, not met)         Long Term Goals: 12 weeks   1. Pt will be able to walk >= 30 minutes with household chores with <3/10 pain.( progressing, not met)   2. Pt will be able to  ascend/descend 1 flight of stairs with reciprocal gait pattern. ( progressing, not met)   3. Pt will improve Hip and ankle strength to 5/5 bilaterally.( progressing, not met)   4. Pt to demonstrate improved functional ability with FOTO limitation <= 20%.( progressing, not met)       Plan     Cont to progress towards goals set by PT.  Cont to increase quad and ankle strength next visit.        Ivan Purvis, PTA   8/20/2020

## 2020-08-25 ENCOUNTER — CLINICAL SUPPORT (OUTPATIENT)
Dept: REHABILITATION | Facility: OTHER | Age: 65
End: 2020-08-25
Payer: MEDICAID

## 2020-08-25 DIAGNOSIS — G89.29 CHRONIC PAIN OF LEFT ANKLE: ICD-10-CM

## 2020-08-25 DIAGNOSIS — G89.29 CHRONIC PAIN OF RIGHT KNEE: Primary | ICD-10-CM

## 2020-08-25 DIAGNOSIS — M25.561 CHRONIC PAIN OF RIGHT KNEE: Primary | ICD-10-CM

## 2020-08-25 DIAGNOSIS — M25.672 ANKLE STIFFNESS, LEFT: ICD-10-CM

## 2020-08-25 DIAGNOSIS — R29.898 WEAKNESS OF BOTH LOWER EXTREMITIES: ICD-10-CM

## 2020-08-25 DIAGNOSIS — M25.572 CHRONIC PAIN OF LEFT ANKLE: ICD-10-CM

## 2020-08-25 PROCEDURE — 97110 THERAPEUTIC EXERCISES: CPT | Mod: PN

## 2020-08-25 NOTE — PROGRESS NOTES
"    Physical Therapy Daily Treatment Note     Name: Denisha Becker  Clinic Number: [id number]    Therapy Diagnosis:   No diagnosis found.  Physician: Seth Price MD    Visit Date: 8/25/2020    Physician Orders: PT Eval and Treat   Medical Diagnosis from Referral: M25.572 (ICD-10-CM) - Left ankle pain  Evaluation Date: 7/16/2020  Authorization Period Expiration: 8/16/2020  Plan of Care Expiration: 10/16/2020  Visit # / Visits authorized: 9/ 24    Time In: 1015 (pt arrived on time and was seen, was not check in by office until later)  Time Out:  1100  Total Billable Time: 45 minutes    Precautions: Standard    Subjective     Pt states she is feeling better. States she is getting less pain in her ankles.   She was compliant with home exercise program.  Response to previous treatment: no adverse effects  Functional change: no change     Prior Level of Function: Independent  Current Level of Function: Decreased overall mobility d/t pain.     Pain:  Current 0/10, worst 8/10, best 0/10   Location: left ankles   Description: Aching  Aggravating Factors: Standing and Walking  Easing Factors: relaxation, ice, rest and elevation     Patients goals: pt wants to improve on her overall ability to move with out pain. Stairs       Objective     Denisha received therapeutic exercises to develop strength, endurance, ROM and flexibility for 45 minutes including:    GS slant board 1'  R Bike 5 min to increase blood flow   [place] pickup  HR 30x  Shuttle 50# 30x  LAQ 3 x 10 3# B   SLR 2# 3x 10  SL hip abd 2# 3x10  Bridges 3x10   Toe Yoga 1 min  Step ups onto 6" step 2x 1'  SLS on foam surface 20"x3 Sergey  BAPS board front/back & side/side 2x10  Standing hip abduction and ext 2x20    SAQ 30x 2#  Clams 20x  Archisizer 2'  Ankle T-band 4way OTB 30x    Denisha received hot pack for 00 minutes to 00.    Denisha received cold pack for 00  minutes to L ankle for decreased soreness/swelling 1 pillowcase/long sitting- NT  .    Home Exercises Provided " and Patient Education Provided     Education provided:   - Pt edu on proper exercise technique.      Written Home Exercises Provided: yes.  Exercises were reviewed and Denisha was able to demonstrate them prior to the end of the session.  Denisha demonstrated good  understanding of the education provided.     See EMR under Patient Instructions for exercises provided 7/27/2020.    Assessment   Pt tolerated exercise well. LE strengthening progressed to standing in // bars. Pt required increased rest breaks dt increased difficulty with exercises. She showed improvements with endurance and balance with step ups and SLS on foam. Cont to progress balance activities.     Denisha is progressing well towards her goals.    Patient prognosis is Good.     Pt will continue to benefit from skilled outpatient physical therapy to address the deficits listed in the problem list box on initial evaluation, provide pt/family education and to maximize pt's level of independence in the home and community environment.     Pt's spiritual, cultural and educational needs considered and pt agreeable to plan of care and goals.     Anticipated Barriers for therapy: Chronicity of condition      Goals:  Short Term Goals: 6 weeks   1. Pt will be independent with HEP and self management of symptoms ( progressing, not met)   2. Pt will be able to climb 6 steps with reciprocal gait pattern, 1 HR, with <4/10 pain to improve ability to navigate her home.( progressing, not met)   3. Pt will be able to ambulate 300' without gait deviations and <4/10 pain.( progressing, not met)   4. Pt to demo improved functional ability with FOTO limitation <=30% disability. ( progressing, not met)         Long Term Goals: 12 weeks   1. Pt will be able to walk >= 30 minutes with household chores with <3/10 pain.( progressing, not met)   2. Pt will be able to ascend/descend 1 flight of stairs with reciprocal gait pattern. ( progressing, not met)   3. Pt will improve Hip and  ankle strength to 5/5 bilaterally.( progressing, not met)   4. Pt to demonstrate improved functional ability with FOTO limitation <= 20%.( progressing, not met)       Plan     Cont to progress towards goals set by PT.  Cont to increase quad and ankle strength next visit.        Jean Wadsworth, PT   8/25/2020

## 2021-04-28 ENCOUNTER — PATIENT MESSAGE (OUTPATIENT)
Dept: RESEARCH | Facility: HOSPITAL | Age: 66
End: 2021-04-28

## (undated) DEVICE — DRESSING ISLAND TELFA 4X5IN

## (undated) DEVICE — APPLICATOR CHLORAPREP ORN 26ML

## (undated) DEVICE — BLADE SURG CARBON STEEL #10

## (undated) DEVICE — SEE MEDLINE ITEM 152622

## (undated) DEVICE — SCISSOR 5MMX35CM DIRECT DRIVE

## (undated) DEVICE — NDL SPINAL 20GX3.5 HUB

## (undated) DEVICE — ELECTRODE REM PLYHSV RETURN 9

## (undated) DEVICE — COVER TIP CURVED SCISSORS XI

## (undated) DEVICE — SYR 50ML CATH TIP

## (undated) DEVICE — IRRIGATOR ENDOSCOPY DISP.

## (undated) DEVICE — DEVICE TRUCLEAR ULTRA MINI

## (undated) DEVICE — DRESSING LEUKOPLAST FLEX 1X3IN

## (undated) DEVICE — TROCAR ENDOPATH XCEL 12X100MM

## (undated) DEVICE — SOL IRR NACL .9% 3000ML

## (undated) DEVICE — SPONGE LAP 18X18 PREWASHED

## (undated) DEVICE — SUT STRATAFIX PGAPCL 3 RB-1

## (undated) DEVICE — BELLOW CANN HEMOBLAST 1.65GR

## (undated) DEVICE — SEE MEDLINE ITEM 156923

## (undated) DEVICE — SUT CTD VICRYL 0 UND BR SUT

## (undated) DEVICE — DRAPE COLUMN DAVINCI XI

## (undated) DEVICE — SOL ELECTROLUBE ANTI-STIC

## (undated) DEVICE — SOL 9P NACL IRR PIC IL

## (undated) DEVICE — SEE MEDLINE ITEM 153151

## (undated) DEVICE — POSITIONER HEAD ADULT

## (undated) DEVICE — Device

## (undated) DEVICE — NDL INSUF ULTRA VERESS 120MM

## (undated) DEVICE — SEAL UNIVERSAL 5MM-8MM XI

## (undated) DEVICE — BAG TISS RETRV MONARCH 10MM

## (undated) DEVICE — SUT MONOCYRL 4-0 PS2 UND

## (undated) DEVICE — SOL WATER STRL IRR 1000ML

## (undated) DEVICE — SUT 2/0 27IN PLAIN GUT CT

## (undated) DEVICE — SEE MEDLINE ITEM 146313

## (undated) DEVICE — SEE MEDLINE ITEM 146417

## (undated) DEVICE — LUBRICANT SURGILUBE 2 OZ

## (undated) DEVICE — DRAPE ARM DAVINCI XI

## (undated) DEVICE — SET BASIN 48X48IN 6000ML RING

## (undated) DEVICE — INSERT CUSHIONPRONE VIEW LARGE

## (undated) DEVICE — APPLICATOR HEMOBLAST LAPSCP

## (undated) DEVICE — CLOSURE SKIN STERI STRIP 1/2X4

## (undated) DEVICE — SUT 0 VICRYL PLUS CT-1 27IN

## (undated) DEVICE — KIT WING PAD POSITIONING

## (undated) DEVICE — SUT PDS II 0 CT VIL MONO 36

## (undated) DEVICE — GLOVE BIOGEL SKINSENSE PI 6.5

## (undated) DEVICE — CONTAINER SPECIMEN STRL 4OZ

## (undated) DEVICE — CORD BIPOLAR 12 FOOT

## (undated) DEVICE — SYR 30CC LUER LOCK

## (undated) DEVICE — SOL CLEARIFY VISUALIZATION LAP

## (undated) DEVICE — PENCIL ELECTROSURG HOLST W/BLD

## (undated) DEVICE — SOL NS 1000CC

## (undated) DEVICE — DRAPE INCISE IOBAN 2 23X17IN

## (undated) DEVICE — KIT PROCEDURE STER INLET CLOSU

## (undated) DEVICE — CLIPPER BLADE MOD 4406 (CAREF)

## (undated) DEVICE — SET TRI-LUMEN FILTERED TUBE

## (undated) DEVICE — ADHESIVE DERMABOND ADVANCED

## (undated) DEVICE — SET HYSTEROLUX INFLOW TUBE

## (undated) DEVICE — TROCAR KII BLLN 12MM 10CM

## (undated) DEVICE — SUT VICRYL+ 27 UR-6 VIOL

## (undated) DEVICE — BAG TISSUE RETRIEVAL 225ML

## (undated) DEVICE — UNDERGLOVES BIOGEL PI SZ 7 LF

## (undated) DEVICE — DRAPE STERI LONG